# Patient Record
Sex: MALE | Race: WHITE | NOT HISPANIC OR LATINO | Employment: FULL TIME | URBAN - METROPOLITAN AREA
[De-identification: names, ages, dates, MRNs, and addresses within clinical notes are randomized per-mention and may not be internally consistent; named-entity substitution may affect disease eponyms.]

---

## 2018-11-23 ENCOUNTER — HOSPITAL ENCOUNTER (EMERGENCY)
Facility: HOSPITAL | Age: 43
Discharge: HOME | End: 2018-11-23
Attending: FAMILY MEDICINE
Payer: MEDICARE

## 2018-11-23 VITALS
OXYGEN SATURATION: 99 % | RESPIRATION RATE: 18 BRPM | SYSTOLIC BLOOD PRESSURE: 125 MMHG | HEART RATE: 121 BPM | TEMPERATURE: 98 F | DIASTOLIC BLOOD PRESSURE: 69 MMHG

## 2018-11-23 DIAGNOSIS — R41.82 ALTERED MENTAL STATE: ICD-10-CM

## 2018-11-23 DIAGNOSIS — F19.90 DRUG USE: Primary | ICD-10-CM

## 2018-11-23 LAB
ALBUMIN SERPL BCP-MCNC: 4.1 G/DL
ALLENS TEST: ABNORMAL
ALP SERPL-CCNC: 70 U/L
ALT SERPL W/O P-5'-P-CCNC: 140 U/L
AMORPH CRY URNS QL MICRO: NORMAL
AMPHET+METHAMPHET UR QL: NORMAL
ANION GAP SERPL CALC-SCNC: 17 MMOL/L
APAP SERPL-MCNC: <3 UG/ML
APTT BLDCRRT: 27.8 SEC
AST SERPL-CCNC: 103 U/L
BACTERIA #/AREA URNS HPF: NORMAL /HPF
BARBITURATES UR QL SCN>200 NG/ML: NEGATIVE
BASOPHILS # BLD AUTO: 0.02 K/UL
BASOPHILS NFR BLD: 0.1 %
BENZODIAZ UR QL SCN>200 NG/ML: NORMAL
BILIRUB SERPL-MCNC: 1.9 MG/DL
BILIRUB UR QL STRIP: NEGATIVE
BNP SERPL-MCNC: <10 PG/ML
BUN SERPL-MCNC: 17 MG/DL
BZE UR QL SCN: NEGATIVE
CALCIUM SERPL-MCNC: 9.7 MG/DL
CANNABINOIDS UR QL SCN: NORMAL
CHLORIDE SERPL-SCNC: 99 MMOL/L
CK SERPL-CCNC: 647 U/L
CLARITY UR: CLEAR
CO2 SERPL-SCNC: 16 MMOL/L
COLOR UR: YELLOW
CREAT SERPL-MCNC: 1.3 MG/DL
CREAT UR-MCNC: 90 MG/DL
DELSYS: ABNORMAL
DIFFERENTIAL METHOD: ABNORMAL
EOSINOPHIL # BLD AUTO: 0 K/UL
EOSINOPHIL NFR BLD: 0.1 %
ERYTHROCYTE [DISTWIDTH] IN BLOOD BY AUTOMATED COUNT: 14 %
EST. GFR  (AFRICAN AMERICAN): >60 ML/MIN/1.73 M^2
EST. GFR  (NON AFRICAN AMERICAN): >60 ML/MIN/1.73 M^2
ETHANOL SERPL-MCNC: 14 MG/DL
FIO2: 21
GLUCOSE SERPL-MCNC: 91 MG/DL
GLUCOSE UR QL STRIP: NEGATIVE
HCO3 UR-SCNC: 19.6 MMOL/L (ref 24–28)
HCT VFR BLD AUTO: 41.9 %
HGB BLD-MCNC: 14.8 G/DL
HGB UR QL STRIP: ABNORMAL
HYALINE CASTS #/AREA URNS LPF: 0 /LPF
INR PPP: 1
KETONES UR QL STRIP: ABNORMAL
LEUKOCYTE ESTERASE UR QL STRIP: NEGATIVE
LYMPHOCYTES # BLD AUTO: 1.3 K/UL
LYMPHOCYTES NFR BLD: 7.7 %
MAGNESIUM SERPL-MCNC: 3.1 MG/DL
MCH RBC QN AUTO: 32.2 PG
MCHC RBC AUTO-ENTMCNC: 35.3 G/DL
MCV RBC AUTO: 91 FL
METHADONE UR QL SCN>300 NG/ML: NEGATIVE
MICROSCOPIC COMMENT: NORMAL
MODE: ABNORMAL
MONOCYTES # BLD AUTO: 1.7 K/UL
MONOCYTES NFR BLD: 10.6 %
NEUTROPHILS # BLD AUTO: 13.4 K/UL
NEUTROPHILS NFR BLD: 81.5 %
NITRITE UR QL STRIP: NEGATIVE
OPIATES UR QL SCN: NEGATIVE
PCO2 BLDA: 35.5 MMHG (ref 35–45)
PCP UR QL SCN>25 NG/ML: NEGATIVE
PH SMN: 7.35 [PH] (ref 7.35–7.45)
PH UR STRIP: 6 [PH] (ref 5–8)
PLATELET # BLD AUTO: 233 K/UL
PMV BLD AUTO: 9.6 FL
PO2 BLDA: 28 MMHG (ref 40–60)
POC BE: -6 MMOL/L
POC SATURATED O2: 49 % (ref 95–100)
POTASSIUM SERPL-SCNC: 3.8 MMOL/L
PROT SERPL-MCNC: 7.7 G/DL
PROT UR QL STRIP: ABNORMAL
PROTHROMBIN TIME: 10.7 SEC
RBC # BLD AUTO: 4.59 M/UL
RBC #/AREA URNS HPF: 0 /HPF (ref 0–4)
SALICYLATES SERPL-MCNC: <5 MG/DL
SAMPLE: ABNORMAL
SITE: ABNORMAL
SODIUM SERPL-SCNC: 132 MMOL/L
SP GR UR STRIP: 1.02 (ref 1–1.03)
SQUAMOUS #/AREA URNS HPF: 0 /HPF
TOXICOLOGY INFORMATION: NORMAL
TROPONIN I SERPL DL<=0.01 NG/ML-MCNC: <0.006 NG/ML
TSH SERPL DL<=0.005 MIU/L-ACNC: 2.02 UIU/ML
URN SPEC COLLECT METH UR: ABNORMAL
UROBILINOGEN UR STRIP-ACNC: 1 EU/DL
WBC # BLD AUTO: 16.41 K/UL
WBC #/AREA URNS HPF: 3 /HPF (ref 0–5)

## 2018-11-23 PROCEDURE — 80320 DRUG SCREEN QUANTALCOHOLS: CPT

## 2018-11-23 PROCEDURE — 84484 ASSAY OF TROPONIN QUANT: CPT

## 2018-11-23 PROCEDURE — 85025 COMPLETE CBC W/AUTO DIFF WBC: CPT

## 2018-11-23 PROCEDURE — 36415 COLL VENOUS BLD VENIPUNCTURE: CPT

## 2018-11-23 PROCEDURE — 25000003 PHARM REV CODE 250: Performed by: FAMILY MEDICINE

## 2018-11-23 PROCEDURE — 82550 ASSAY OF CK (CPK): CPT

## 2018-11-23 PROCEDURE — 80307 DRUG TEST PRSMV CHEM ANLYZR: CPT

## 2018-11-23 PROCEDURE — 85610 PROTHROMBIN TIME: CPT

## 2018-11-23 PROCEDURE — 63600175 PHARM REV CODE 636 W HCPCS: Performed by: FAMILY MEDICINE

## 2018-11-23 PROCEDURE — 96372 THER/PROPH/DIAG INJ SC/IM: CPT | Mod: 59

## 2018-11-23 PROCEDURE — 81000 URINALYSIS NONAUTO W/SCOPE: CPT | Mod: 59

## 2018-11-23 PROCEDURE — 96374 THER/PROPH/DIAG INJ IV PUSH: CPT

## 2018-11-23 PROCEDURE — 99285 EMERGENCY DEPT VISIT HI MDM: CPT | Mod: 25

## 2018-11-23 PROCEDURE — 82803 BLOOD GASES ANY COMBINATION: CPT

## 2018-11-23 PROCEDURE — 83735 ASSAY OF MAGNESIUM: CPT

## 2018-11-23 PROCEDURE — 93005 ELECTROCARDIOGRAM TRACING: CPT

## 2018-11-23 PROCEDURE — 93010 ELECTROCARDIOGRAM REPORT: CPT | Mod: ,,, | Performed by: INTERNAL MEDICINE

## 2018-11-23 PROCEDURE — 80329 ANALGESICS NON-OPIOID 1 OR 2: CPT

## 2018-11-23 PROCEDURE — 83880 ASSAY OF NATRIURETIC PEPTIDE: CPT

## 2018-11-23 PROCEDURE — 85730 THROMBOPLASTIN TIME PARTIAL: CPT

## 2018-11-23 PROCEDURE — 96361 HYDRATE IV INFUSION ADD-ON: CPT

## 2018-11-23 PROCEDURE — 80053 COMPREHEN METABOLIC PANEL: CPT

## 2018-11-23 PROCEDURE — 84443 ASSAY THYROID STIM HORMONE: CPT

## 2018-11-23 PROCEDURE — 99900035 HC TECH TIME PER 15 MIN (STAT)

## 2018-11-23 RX ORDER — HALOPERIDOL 5 MG/ML
10 INJECTION INTRAMUSCULAR
Status: COMPLETED | OUTPATIENT
Start: 2018-11-23 | End: 2018-11-23

## 2018-11-23 RX ADMIN — HALOPERIDOL LACTATE 10 MG: 5 INJECTION, SOLUTION INTRAMUSCULAR at 01:11

## 2018-11-23 RX ADMIN — LORAZEPAM 2 MG: 2 INJECTION INTRAMUSCULAR; INTRAVENOUS at 12:11

## 2018-11-23 RX ADMIN — SODIUM CHLORIDE 1000 ML: 0.9 INJECTION, SOLUTION INTRAVENOUS at 02:11

## 2018-11-23 NOTE — ED PROVIDER NOTES
SCRIBE #1 NOTE: I, Regi Hicks, am scribing for, and in the presence of, Rabia Brock MD. I have scribed the HPI, ROS, and PEx.     SCRIBE #2 NOTE: I, Samara Chou, am scribing for, and in the presence of,  Bairon Bellamy MD. I have scribed the remaining portions of the note not scribed by Scribe #1.     History      Chief Complaint   Patient presents with    Hallucinations     person on scene reports possible seizure. Unknown if substance abuse, unknown if hit head. 5mg Versed given by EMS.        Review of patient's allergies indicates:  Not on File     HPI   HPI    11/23/2018, 12:31 AM   History obtained from EMS  HPI limited secondary to AMS      History of Present Illness: David Madrid is a 43 y.o. male patient who presents to the Emergency Department for AMS. Person on scene reports pt possibly had a seizure. Unknown substance abuse. Unknown head trauma. Unknown whether pt has a PMHx of seizures. Pt was given 5 of Versed en route.       Arrival mode: EMS    PCP: Primary Doctor No       Past Medical History:  Past medical history reviewed not relevant      Past Surgical History:  Past surgical history reviewed not relevant      Family History:  Family history reviewed not relevant      Social History:  Social History    Social History Main Topics    Social History Main Topics    Smoking status: Unknown if ever smoked    Smokeless tobacco: Unknown if ever used    Alcohol Use: Unknown drinking history    Drug Use: Unknown if ever used    Sexual Activity: Unknown       ROS   Review of Systems   Unable to perform ROS: Mental status change     Physical Exam      Initial Vitals   BP Pulse Resp Temp SpO2   11/23/18 0009 11/23/18 0009 11/23/18 0009 11/23/18 0014 11/23/18 0009   112/76 (!) 112 (!) 24 97 °F (36.1 °C) 95 %      MAP       --                 Physical Exam  Nursing Notes and Vital Signs Reviewed.  Constitutional: Patient is in mild distress. Well-developed and well-nourished.  Head: Atraumatic.  Normocephalic.  Eyes: PERRL but dilated, approximately 4-5mm. EOM intact. Conjunctivae are not pale. No scleral icterus.  ENT: Mucous membranes are moist.  Neck: Supple. Full ROM.   Cardiovascular: Regular rate. Regular rhythm. No murmurs, rubs, or gallops. Distal pulses are 2+ and symmetric.  Pulmonary/Chest: No respiratory distress. Clear to auscultation bilaterally. No wheezing or rales.  Abdominal: Soft and non-distended.   Musculoskeletal: Moves all extremities. No obvious deformities. No edema.  Skin: Warm and dry.  Neurological: Altered. Speech is incoherent. Neuro exam limited, pt is uncooperative.    ED Course    Procedures  ED Vital Signs:  Vitals:    11/23/18 0009 11/23/18 0014 11/23/18 0232 11/23/18 0252   BP: 112/76   122/72   Pulse: (!) 112  84 97   Resp: (!) 24  20    Temp:  97 °F (36.1 °C) 97.4 °F (36.3 °C)    TempSrc: Oral Axillary Oral    SpO2: 95%  96% 98%    11/23/18 0302 11/23/18 0315 11/23/18 0400 11/23/18 0545   BP: (!) 104/52 (!) 141/71 (!) 108/58 (!) 108/57   Pulse: 83 87 84 75   Resp:  18 20 18   Temp:       TempSrc:       SpO2: 95% 96% 97% 96%    11/23/18 0706 11/23/18 0710 11/23/18 0838 11/23/18 0902   BP: (!) 158/84  (!) 125/57    Pulse: 78 82 86    Resp:       Temp:    97.7 °F (36.5 °C)   TempSrc:       SpO2: 99%  99%     11/23/18 1008   BP: 125/69   Pulse: (!) 121   Resp:    Temp:    TempSrc:    SpO2: 99%       Abnormal Lab Results:  Labs Reviewed   ACETAMINOPHEN LEVEL - Abnormal; Notable for the following components:       Result Value    Acetaminophen (Tylenol), Serum <3.0 (*)     All other components within normal limits   CBC W/ AUTO DIFFERENTIAL - Abnormal; Notable for the following components:    WBC 16.41 (*)     RBC 4.59 (*)     MCH 32.2 (*)     Gran # (ANC) 13.4 (*)     Mono # 1.7 (*)     Gran% 81.5 (*)     Lymph% 7.7 (*)     All other components within normal limits   COMPREHENSIVE METABOLIC PANEL - Abnormal; Notable for the following components:    Sodium 132 (*)     CO2 16  (*)     Total Bilirubin 1.9 (*)      (*)      (*)     Anion Gap 17 (*)     All other components within normal limits   ALCOHOL,MEDICAL (ETHANOL) - Abnormal; Notable for the following components:    Alcohol, Medical, Serum 14 (*)     All other components within normal limits   MAGNESIUM - Abnormal; Notable for the following components:    Magnesium 3.1 (*)     All other components within normal limits   SALICYLATE LEVEL - Abnormal; Notable for the following components:    Salicylate Lvl <5.0 (*)     All other components within normal limits   URINALYSIS, REFLEX TO URINE CULTURE - Abnormal; Notable for the following components:    Protein, UA 1+ (*)     Ketones, UA 2+ (*)     Occult Blood UA Trace (*)     All other components within normal limits    Narrative:     Preferred Collection Type->Urine, Clean Catch   CK - Abnormal; Notable for the following components:     (*)     All other components within normal limits   ISTAT PROCEDURE - Abnormal; Notable for the following components:    POC PO2 28 (*)     POC HCO3 19.6 (*)     POC SATURATED O2 49 (*)     All other components within normal limits   APTT   B-TYPE NATRIURETIC PEPTIDE   DRUG SCREEN PANEL, URINE EMERGENCY    Narrative:     Preferred Collection Type->Urine, Clean Catch   PROTIME-INR   TROPONIN I   TSH   CK   URINALYSIS MICROSCOPIC    Narrative:     Preferred Collection Type->Urine, Clean Catch        All Lab Results:  Results for orders placed or performed during the hospital encounter of 11/23/18   Acetaminophen level   Result Value Ref Range    Acetaminophen (Tylenol), Serum <3.0 (L) 10.0 - 20.0 ug/mL   APTT   Result Value Ref Range    aPTT 27.8 21.0 - 32.0 sec   Brain natriuretic peptide   Result Value Ref Range    BNP <10 0 - 99 pg/mL   CBC auto differential   Result Value Ref Range    WBC 16.41 (H) 3.90 - 12.70 K/uL    RBC 4.59 (L) 4.60 - 6.20 M/uL    Hemoglobin 14.8 14.0 - 18.0 g/dL    Hematocrit 41.9 40.0 - 54.0 %    MCV 91 82 -  98 fL    MCH 32.2 (H) 27.0 - 31.0 pg    MCHC 35.3 32.0 - 36.0 g/dL    RDW 14.0 11.5 - 14.5 %    Platelets 233 150 - 350 K/uL    MPV 9.6 9.2 - 12.9 fL    Gran # (ANC) 13.4 (H) 1.8 - 7.7 K/uL    Lymph # 1.3 1.0 - 4.8 K/uL    Mono # 1.7 (H) 0.3 - 1.0 K/uL    Eos # 0.0 0.0 - 0.5 K/uL    Baso # 0.02 0.00 - 0.20 K/uL    Gran% 81.5 (H) 38.0 - 73.0 %    Lymph% 7.7 (L) 18.0 - 48.0 %    Mono% 10.6 4.0 - 15.0 %    Eosinophil% 0.1 0.0 - 8.0 %    Basophil% 0.1 0.0 - 1.9 %    Differential Method Automated    Comprehensive metabolic panel   Result Value Ref Range    Sodium 132 (L) 136 - 145 mmol/L    Potassium 3.8 3.5 - 5.1 mmol/L    Chloride 99 95 - 110 mmol/L    CO2 16 (L) 23 - 29 mmol/L    Glucose 91 70 - 110 mg/dL    BUN, Bld 17 6 - 20 mg/dL    Creatinine 1.3 0.5 - 1.4 mg/dL    Calcium 9.7 8.7 - 10.5 mg/dL    Total Protein 7.7 6.0 - 8.4 g/dL    Albumin 4.1 3.5 - 5.2 g/dL    Total Bilirubin 1.9 (H) 0.1 - 1.0 mg/dL    Alkaline Phosphatase 70 55 - 135 U/L     (H) 10 - 40 U/L     (H) 10 - 44 U/L    Anion Gap 17 (H) 8 - 16 mmol/L    eGFR if African American >60 >60 mL/min/1.73 m^2    eGFR if non African American >60 >60 mL/min/1.73 m^2   Drug screen panel, emergency   Result Value Ref Range    Benzodiazepines Presumptive Positive     Methadone metabolites Negative     Cocaine (Metab.) Negative     Opiate Scrn, Ur Negative     Barbiturate Screen, Ur Negative     Amphetamine Screen, Ur Presumptive Positive     THC Presumptive Positive     Phencyclidine Negative     Creatinine, Random Ur 90.0 23.0 - 375.0 mg/dL    Toxicology Information SEE COMMENT    Ethanol   Result Value Ref Range    Alcohol, Medical, Serum 14 (H) <10 mg/dL   Magnesium   Result Value Ref Range    Magnesium 3.1 (H) 1.6 - 2.6 mg/dL   Protime-INR   Result Value Ref Range    Prothrombin Time 10.7 9.0 - 12.5 sec    INR 1.0 0.8 - 1.2   Salicylate level   Result Value Ref Range    Salicylate Lvl <5.0 (L) 15.0 - 30.0 mg/dL   Troponin I   Result Value Ref  Range    Troponin I <0.006 0.000 - 0.026 ng/mL   TSH   Result Value Ref Range    TSH 2.017 0.400 - 4.000 uIU/mL   Urinalysis, Reflex to Urine Culture Urine, Clean Catch   Result Value Ref Range    Specimen UA Urine, Catheterized     Color, UA Yellow Yellow, Straw, Xuan    Appearance, UA Clear Clear    pH, UA 6.0 5.0 - 8.0    Specific Gravity, UA 1.025 1.005 - 1.030    Protein, UA 1+ (A) Negative    Glucose, UA Negative Negative    Ketones, UA 2+ (A) Negative    Bilirubin (UA) Negative Negative    Occult Blood UA Trace (A) Negative    Nitrite, UA Negative Negative    Urobilinogen, UA 1.0 <2.0 EU/dL    Leukocytes, UA Negative Negative   CK   Result Value Ref Range     (H) 20 - 200 U/L   Urinalysis Microscopic   Result Value Ref Range    RBC, UA 0 0 - 4 /hpf    WBC, UA 3 0 - 5 /hpf    Bacteria, UA Occasional None-Occ /hpf    Squam Epithel, UA 0 /hpf    Hyaline Casts, UA 0 0-1/lpf /lpf    Amorphous, UA Few None-Moderate    Microscopic Comment SEE COMMENT    ISTAT PROCEDURE   Result Value Ref Range    POC PH 7.351 7.35 - 7.45    POC PCO2 35.5 35 - 45 mmHg    POC PO2 28 (LL) 40 - 60 mmHg    POC HCO3 19.6 (L) 24 - 28 mmol/L    POC BE -6 -2 to 2 mmol/L    POC SATURATED O2 49 (L) 95 - 100 %    Sample VENOUS     Site Other     Allens Test N/A     DelSys Room Air     Mode SPONT     FiO2 21        Imaging Results:  Imaging Results          CT Head Without Contrast (Final result)  Result time 11/23/18 06:33:26    Final result by Pedrito Gallo III, MD (11/23/18 06:33:26)                 Impression:      Normal non contrast CT scan of the brain.    All CT scans at this facility are performed  using dose modulation techniques as appropriate to performed exam including the following:  automated exposure control; adjustment of mA and/or kV according to the patients size (this includes techniques or standardized protocols for targeted exams where dose is matched to indication/reason for exam: i.e. extremities or head);   iterative reconstruction technique.      Electronically signed by: Pedrito Gallo MD  Date:    11/23/2018  Time:    06:33             Narrative:    EXAMINATION:  CT HEAD WITHOUT CONTRAST    CLINICAL HISTORY:  Confusion/delirium, altered LOC, unexplained;    TECHNIQUE:  Standard non contrast CT scan of the brain.    COMPARISON:  None    FINDINGS:  The scan is slightly degraded by motion.  Otherwise there is no mass lesion, hemorrhage, hydrocephalus, or midline shift.  No acute/depressed skull fracture.                               X-Ray Chest AP Portable (Final result)  Result time 11/23/18 07:20:47    Final result by Pedrito Gallo III, MD (11/23/18 07:20:47)                 Impression:      Negative one view chest x-ray.      Electronically signed by: Pedrito Gallo MD  Date:    11/23/2018  Time:    07:20             Narrative:    EXAMINATION:  XR CHEST AP PORTABLE    CLINICAL HISTORY:  AMS;    COMPARISON:  None    FINDINGS:  Heart size is normal. The lung fields are clear. No acute pulmonary infiltrate.                               5:57 AM: Per STAT radiology, pt's CT Head results: no acute intracranial findings.    Ordered, reviewed, and independently interpreted by the ED provider.  Study: X-ray Chest  Findings: NAF    The EKG was ordered, reviewed, and independently interpreted by the ED provider.  Interpretation time: 0150  Rate: 82 BPM  Rhythm: normal sinus rhythm  Interpretation: Cannot r/o anterior infarct. No STEMI.           The Emergency Provider reviewed the vital signs and test results, which are outlined above.    ED Discussion     2:45 AM: Re-evaluated pt. Pt is resting comfortably in bed and VSS at this time.     2:57 AM: Dr. Brock transfers care of pt to Dr. Bellamy, pending lab/imaging results.    5:47 AM: Dr. Bellamy evaluated pt. Pt is resting comfortably and is in no acute distress. Patient is sleeping, but arouses to light tactile and verbal stimuli. Patient is unable to stay awake  to answer questions and follow commands. Patient in no respiratory distress. Pt is stable for discharge. Patient will be discharged upon becoming more alert.      ED Medication(s):  Medications   sodium chloride 0.9% bolus 1,000 mL (0 mLs Intravenous Stopped 11/23/18 0434)   lorazepam (ATIVAN) injection 2 mg (2 mg Intravenous Given 11/23/18 0050)   haloperidol lactate injection 10 mg (10 mg Intramuscular Given 11/23/18 0108)       Follow-up Information     [facility].    Contact information:  [address] [address] [phone]             [facility].    Specialty:  Emergency Medicine  Why:  As needed, If symptoms worsen  Contact information:  [address] [address] [phone]                   Medical Decision Making    Medical Decision Making:   Clinical Tests:   Lab Tests: Ordered and Reviewed  Radiological Study: Reviewed and Ordered  Medical Tests: Reviewed and Ordered           Scribe Attestation:   Scribe #1: I performed the above scribed service and the documentation accurately describes the services I performed. I attest to the accuracy of the note.    Attending:   Physician Attestation Statement for Scribe #1: I, Rabia Brock MD, personally performed the services described in this documentation, as scribed by Regi Hicks, in my presence, and it is both accurate and complete.       Scribe Attestation:   Scribe #2: I performed the above scribed service and the documentation accurately describes the services I performed. I attest to the accuracy of the note.    Attending Attestation:           Physician Attestation for Scribe:    Physician Attestation Statement for Scribe #2: I, Bairon Bellamy MD, reviewed documentation, as scribed by Samara Chuo in my presence, and it is both accurate and complete. I also acknowledge and confirm the content of the note done by Scribe #1.          Clinical Impression       ICD-10-CM  ICD-9-CM   1. Drug use F19.90 305.90   2. Altered mental state R41.82 780.97       Disposition:   Disposition: Discharged  Condition: Stable         Si NINA Bellamy MD  11/23/18 7493

## 2018-11-23 NOTE — ED NOTES
Pt arrived to the ED by [facility] with hallucinations pt was given 2mg ativan iv and 10 mg of haldol to help calm pt, after pt received haldol pt seems to be resting easily In bed.

## 2018-11-23 NOTE — ED NOTES
Patient still laying in bed. Arouses to vigorous stimulii. Dr. Bellamy verbalized patient can be discharged once awake and alert and able to speak and walk. Dr. Bellamy aware of patient low BP.

## 2018-11-23 NOTE — ED NOTES
Pt AAO, denies hallucinations at this time. Pt declined me calling someone for him, Pt got cell phone to call someone to get him

## 2018-11-23 NOTE — ED NOTES
Unable to perform ekg due to pt condition. MD and RN notified. Will continue attempt when Pt is capable.

## 2018-11-23 NOTE — ED NOTES
Patient laying in bed. Patient still not talking. Just moaning and moving in bed when aroused. Patient on monitor, will continue to monitor.

## 2019-02-27 DIAGNOSIS — M54.2 CERVICALGIA: Primary | ICD-10-CM

## 2019-03-30 ENCOUNTER — HOSPITAL ENCOUNTER (INPATIENT)
Facility: HOSPITAL | Age: 44
LOS: 6 days | Discharge: HOME | DRG: 897 | End: 2019-04-05
Attending: PSYCHIATRY & NEUROLOGY | Admitting: PSYCHIATRY & NEUROLOGY
Payer: MEDICARE

## 2019-03-30 DIAGNOSIS — B18.2 CHRONIC HEPATITIS C WITHOUT HEPATIC COMA: ICD-10-CM

## 2019-03-30 DIAGNOSIS — F19.94 SUBSTANCE INDUCED MOOD DISORDER: ICD-10-CM

## 2019-03-30 DIAGNOSIS — F10.939 ALCOHOL WITHDRAWAL SYNDROME WITH COMPLICATION: ICD-10-CM

## 2019-03-30 DIAGNOSIS — F43.10 PTSD (POST-TRAUMATIC STRESS DISORDER): ICD-10-CM

## 2019-03-30 DIAGNOSIS — F10.20 ALCOHOL USE DISORDER, SEVERE, DEPENDENCE: ICD-10-CM

## 2019-03-30 DIAGNOSIS — K21.9 GASTROESOPHAGEAL REFLUX DISEASE, ESOPHAGITIS PRESENCE NOT SPECIFIED: ICD-10-CM

## 2019-03-30 DIAGNOSIS — F10.94 ALCOHOL-INDUCED MOOD DISORDER: ICD-10-CM

## 2019-03-30 LAB
ESTIMATED AVG GLUCOSE: 103 MG/DL (ref 68–131)
FOLATE SERPL-MCNC: 19.3 NG/ML (ref 4–24)
HBA1C MFR BLD HPLC: 5.2 % (ref 4–5.6)
VIT B12 SERPL-MCNC: 429 PG/ML (ref 210–950)

## 2019-03-30 PROCEDURE — 84425 ASSAY OF VITAMIN B-1: CPT

## 2019-03-30 PROCEDURE — 36415 COLL VENOUS BLD VENIPUNCTURE: CPT

## 2019-03-30 PROCEDURE — 12400001 HC PSYCH SEMI-PRIVATE ROOM

## 2019-03-30 PROCEDURE — 82607 VITAMIN B-12: CPT

## 2019-03-30 PROCEDURE — 25000003 PHARM REV CODE 250: Performed by: PSYCHIATRY & NEUROLOGY

## 2019-03-30 PROCEDURE — 99223 1ST HOSP IP/OBS HIGH 75: CPT | Mod: AI,GC,, | Performed by: PSYCHIATRY & NEUROLOGY

## 2019-03-30 PROCEDURE — 83036 HEMOGLOBIN GLYCOSYLATED A1C: CPT

## 2019-03-30 PROCEDURE — 99223 PR INITIAL HOSPITAL CARE,LEVL III: ICD-10-PCS | Mod: AI,GC,, | Performed by: PSYCHIATRY & NEUROLOGY

## 2019-03-30 PROCEDURE — 82746 ASSAY OF FOLIC ACID SERUM: CPT

## 2019-03-30 RX ORDER — DIPHENHYDRAMINE HYDROCHLORIDE 50 MG/ML
50 INJECTION INTRAMUSCULAR; INTRAVENOUS EVERY 4 HOURS PRN
Status: DISCONTINUED | OUTPATIENT
Start: 2019-03-30 | End: 2019-04-05 | Stop reason: HOSPADM

## 2019-03-30 RX ORDER — PRAZOSIN HYDROCHLORIDE 1 MG/1
2 CAPSULE ORAL NIGHTLY
Status: DISCONTINUED | OUTPATIENT
Start: 2019-03-30 | End: 2019-04-01

## 2019-03-30 RX ORDER — THIAMINE HCL 100 MG
100 TABLET ORAL DAILY
Status: DISCONTINUED | OUTPATIENT
Start: 2019-03-30 | End: 2019-03-30

## 2019-03-30 RX ORDER — QUETIAPINE FUMARATE 300 MG/1
300 TABLET, FILM COATED ORAL NIGHTLY
Status: DISCONTINUED | OUTPATIENT
Start: 2019-03-30 | End: 2019-04-05 | Stop reason: HOSPADM

## 2019-03-30 RX ORDER — DOCUSATE SODIUM 100 MG/1
100 CAPSULE, LIQUID FILLED ORAL DAILY PRN
Status: DISCONTINUED | OUTPATIENT
Start: 2019-03-30 | End: 2019-04-05 | Stop reason: HOSPADM

## 2019-03-30 RX ORDER — DIPHENHYDRAMINE HCL 25 MG
25 CAPSULE ORAL NIGHTLY PRN
Status: DISCONTINUED | OUTPATIENT
Start: 2019-03-30 | End: 2019-04-05 | Stop reason: HOSPADM

## 2019-03-30 RX ORDER — FOLIC ACID 1 MG/1
1 TABLET ORAL DAILY
Status: DISCONTINUED | OUTPATIENT
Start: 2019-03-30 | End: 2019-04-05 | Stop reason: HOSPADM

## 2019-03-30 RX ORDER — IBUPROFEN 400 MG/1
400 TABLET ORAL EVERY 6 HOURS PRN
Status: DISCONTINUED | OUTPATIENT
Start: 2019-03-30 | End: 2019-04-05 | Stop reason: HOSPADM

## 2019-03-30 RX ORDER — DIAZEPAM 5 MG/1
10 TABLET ORAL 3 TIMES DAILY
Status: DISCONTINUED | OUTPATIENT
Start: 2019-03-30 | End: 2019-04-01

## 2019-03-30 RX ORDER — THIAMINE HCL 100 MG
100 TABLET ORAL DAILY
Status: DISCONTINUED | OUTPATIENT
Start: 2019-04-03 | End: 2019-03-31

## 2019-03-30 RX ORDER — MIRTAZAPINE 30 MG/1
30 TABLET, FILM COATED ORAL NIGHTLY
Status: DISCONTINUED | OUTPATIENT
Start: 2019-03-30 | End: 2019-04-05 | Stop reason: HOSPADM

## 2019-03-30 RX ORDER — HALOPERIDOL 5 MG/ML
5 INJECTION INTRAMUSCULAR EVERY 4 HOURS PRN
Status: DISCONTINUED | OUTPATIENT
Start: 2019-03-30 | End: 2019-04-05 | Stop reason: HOSPADM

## 2019-03-30 RX ORDER — LORAZEPAM 1 MG/1
2 TABLET ORAL EVERY 4 HOURS PRN
Status: DISCONTINUED | OUTPATIENT
Start: 2019-03-30 | End: 2019-04-05 | Stop reason: HOSPADM

## 2019-03-30 RX ORDER — THIAMINE HYDROCHLORIDE 100 MG/ML
100 INJECTION, SOLUTION INTRAMUSCULAR; INTRAVENOUS DAILY
Status: DISCONTINUED | OUTPATIENT
Start: 2019-03-30 | End: 2019-03-31

## 2019-03-30 RX ORDER — DIPHENHYDRAMINE HCL 50 MG
50 CAPSULE ORAL EVERY 4 HOURS PRN
Status: DISCONTINUED | OUTPATIENT
Start: 2019-03-30 | End: 2019-04-05 | Stop reason: HOSPADM

## 2019-03-30 RX ORDER — HALOPERIDOL 5 MG/1
5 TABLET ORAL EVERY 4 HOURS PRN
Status: DISCONTINUED | OUTPATIENT
Start: 2019-03-30 | End: 2019-04-05 | Stop reason: HOSPADM

## 2019-03-30 RX ADMIN — QUETIAPINE FUMARATE 300 MG: 300 TABLET ORAL at 08:03

## 2019-03-30 RX ADMIN — DIAZEPAM 10 MG: 5 TABLET ORAL at 08:03

## 2019-03-30 RX ADMIN — DIAZEPAM 10 MG: 5 TABLET ORAL at 11:03

## 2019-03-30 RX ADMIN — MIRTAZAPINE 30 MG: 30 TABLET, FILM COATED ORAL at 08:03

## 2019-03-30 RX ADMIN — FOLIC ACID 1 MG: 1 TABLET ORAL at 11:03

## 2019-03-30 RX ADMIN — PRAZOSIN HYDROCHLORIDE 2 MG: 1 CAPSULE ORAL at 08:03

## 2019-03-30 RX ADMIN — DIAZEPAM 10 MG: 5 TABLET ORAL at 04:03

## 2019-03-30 RX ADMIN — THERA TABS 1 TABLET: TAB at 11:03

## 2019-03-30 NOTE — PROGRESS NOTES
03/30/19 1300   [facility] Therapy   Group Name Therapeutic Recreation   Specific Interventions Skilled Activity Mild Exercises   Participation Level Appropriate;Attentive   Participation Quality Cooperative;Social   Insight/Motivation Good   Affect/Mood Display Appropriate   Cognition Alert

## 2019-03-30 NOTE — H&P
"[facility]  Psychiatry  History & Physical    Patient Name: David Madrid  MRN: [id number]   Code Status: No Order  Admission Date: (Not on file)  Attending Physician: Dr. Ellis  Primary Care Provider: Primary Doctor No    Current Legal Status: [facility]    Patient information was obtained from patient and ER records.     Subjective:     Principal Problem:<principal problem not specified>    Chief Complaint:  SI     HPI: Per ER RN:  "Pt presents to ED with suicidal ideation since returning from [place] in 2001. Pt states that he thinks about committing suicide daily and even dreams about killing himself. Pt states that he has severe PTSD for time in the  and that he wakes up several times a night due to nightmares. Pt states that he has a plan to kill himself, but he has never acted upon thoughts because he has a son. Pt states that he has been abusing ETOH for the past few yrs and drinks at least 1 pint of liquor daily and smokes about 1 pck of cigarettes daily as well. Pt states that today he woke up on the floor and was tremoring due to withdrawal from alcohol which led him to drink 1 pint of whiskey and a few beers. Pt states that today he tried to get into a facility to complete a 28 day detox, but was denied. Pt reports that he has gone through two detox programs in the past few yrs. Pt is prescribed Seroquel 300 mg for depression, but reports that he doesn't take it as prescribed because he doesn't like the way it makes him feel. Pt denies illicit drug use. Pt states that he does have family support system and that he no longer works due to being a disabled . Pt AAOx4 and ambulates independently with steady gait.  "     Per ER MD:  "48 y/o M with a medical history of depression, alcohol abuse and Hepatitis C presents with suicidal intention. 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 Patient states that he " "normally drinks 12 beers a day but today he wanted to kill himself by "drinking himself to death". He drank 1 pint of whiskey and 6 beers. States he hasn't eaten in the past 3 days. Patient is originally from [place] and moved down here after he met a woman online. He had a fight with his girlfriend last week, which caused him to spiral. Patient is on seroquel 300 mg, rameron 30 mg and zantec.  "     ========     Patient alert, sitting in chair on entry to room with sitter at bedside. Calm, appropriate, and linear throughout with constricted affect. No objective signs psychosis or mansi during interview. Mansi screen negative re past hx. No history AVH, no current. Patient reports he arrived to the ER for "suicidal thoughts and alcohol detox". Patient reports SI over the past two years with progressive increase in frequency of SI, daily for the past two weeks. Says SI worsened after recent breakup with girlfriend 2 weeks ago. Describes intermittent SI throughout his life, but notes significant worsening after returning from  duty in [place] in 2001. Says since then SI has been off and on, and reports PTSD symptoms since that time of nightmares, night terrors, flashbacks, and exaggerated startle response. Says experiences nightmares nightly, though later in interview when discussing prazosin reports relief of these nightmares when taken. Denies any previous suicide attempts but does report instances of trying to "drink [himself] to death". Denies current plan, reports he did have a plan formulated last year for [id number]. Patient reports depressed mood x2 weeks after break up with girlfriend. Endorses neurovegetative symptoms of poor sleep (trouble falling, staying, + PTSD symptoms), anhedonia (previously enjoyed fishing), guilt and hopelessness ("shame" over current situation, hopelessness to future with notable lack of future-orientation when asked further), poor appetite with 10 lb weight loss over two " "weeks, and recent SI - 5/9 SIGECAPSD x2 weeks.      Patient reports he drinks alcohol daily, endorses rare marijuana use and denies other illicits. Patient says he usually drinks 2-3 beers daily, but describes binge-pattern behavior. Says over the past two days he increased his daily drinking to "at least a 12 pack and a pint of Kalyan Fonseca". Last drink was yesterday ~5 PM tail-end of a binge similar to level just described. Says this is typical for when he starts to binge. Patient was in [facility] last week for domestic battery charges after him and girlfriend got into an argument, reports this was his last detox - was without medication, patient says it took 4 days for symptoms to subside and cites tremors, night sweats, insomnia, and jitters. Patient last went to inpatient rehab last year for alcohol, says "I've been trying to kick this habit a long time". Describes his alcohol use as "waxing and waning" consistent with binge drinking. Patient denies hx of known seizures but alarmingly reports that yesterday he had an episode where he fell, was shaking and bit his tongue. Denies incontinence during the episode.     Patient reports he has been on seroquel 300 mg PO qhs, remeron 30 mg PO qhs, and prazosin 2 mg PO qhs for several years. First filled in [place] but patient now gets them filled by a Dr. Bee on the [facility].     Past Psychiatric History:  Previous Medication Trials: seroquel , remeron, prazosin  Previous Psychiatric Hospitalizations: once, about a year ago in [place] for SI w/ no attempt  Previous Suicide Attempts: denied, though reports attempts to drink himself to death  History of Violence: denied  Outpatient psychiatrist: Dr. Bee on the [facility]      Social History:  Marital Status: single (recent breakup)  Children: 4 adult children  Source of Income: VA benefits and [employer] work ()  Education: HS grad, associates degree [employer]  Special Ed: denied  Housing Status: living with a " friend   History of phys/sexual abuse: denied  Access to gun: denied     Substance Use  Recreational Drugs: rare marijuana, denied others  Use of Alcohol: yes, heavy, see HPI  Tobacco Use: 1 ppd  Rehab History: 2x for alcohol, most recent last year inpatient  H/O Complicated Withdrawal: likely - denies known hx seizures but reports seizure like activity yesterday as noted in HPI      Legal History:  Past Charges/Incarcerations: yes , 6 1/2 years for drug charges  Pending charges: yes, domestic battery charges pending     Family Psychiatric History:  denied             Patient History           Medical as of 3/30/2019     Past Medical History     Diagnosis Date Comments Source    Alcohol abuse -- -- Patient    Depression -- -- Patient    Hepatitis C -- -- Patient                  Surgical as of 3/30/2019    Past Surgical History: Patient provided no pertinent surgical history.           Family as of 3/30/2019    None           Tobacco Use as of 3/30/2019     Smoking Status Smoking Start Date Smoking Quit Date Packs/Day Years Used    Never Assessed -- -- -- --    Types Comments Smokeless Tobacco Status Smokeless Tobacco Quit Date Source    -- -- Unknown -- --            Alcohol Use as of 3/30/2019    None           Drug Use as of 3/30/2019    None           Sexual Activity as of 3/30/2019    None           Activities of Daily Living as of 3/30/2019    None           Social Documentation as of 3/30/2019    None           Occupational as of 3/30/2019    None           Socioeconomic as of 3/30/2019     Marital Status Spouse Name Number of Children Years Education Education Level Preferred Language Ethnicity Race Source    Single -- -- -- -- English /White White --    Financial Resource Strain Food Insecurity: Worry Food Insecurity: Inability Transportation Needs: Medical Transportation Needs: Non-medical    -- -- -- -- --            Pertinent History     Question Response Comments    Lives with -- --    Place in  Birth Order -- --    Lives in -- --    Number of Siblings -- --    Raised by -- --    Legal Involvement -- --    Childhood Trauma -- --    Criminal History of -- --    Financial Status -- --    Highest Level of Education -- --    Does patient have access to a firearm? -- --     Service -- --    Primary Leisure Activity -- --    Spirituality -- --        Past Medical History:   Diagnosis Date    Alcohol abuse     Depression     Hepatitis C      No past surgical history on file.  Family History     None        Tobacco Use    Smoking status: Not on file   Substance and Sexual Activity    Alcohol use: Not on file    Drug use: Not on file    Sexual activity: Not on file     Review of patient's allergies indicates:   Allergen Reactions    Penicillins        Current Facility-Administered Medications on File Prior to Encounter   Medication    [COMPLETED] diazePAM tablet 10 mg    folic acid tablet 1 mg    [COMPLETED] LORazepam tablet 2 mg    multivitamin tablet 1 tablet    [DISCONTINUED] diazePAM injection 10 mg    [DISCONTINUED] folic acid tablet 1 mg    [DISCONTINUED] folic acid tablet 1 mg    [DISCONTINUED] LORazepam tablet 2 mg    [DISCONTINUED] multivitamin tablet 1 tablet     Current Outpatient Medications on File Prior to Encounter   Medication Sig    QUEtiapine (SEROQUEL) 300 MG Tab Take by mouth.     Psychotherapeutics (From admission, onward)    None        Review of Systems   Constitutional: Positive for appetite change and unexpected weight change. Negative for diaphoresis and fever.   HENT: Negative for congestion, ear pain and sore throat.    Eyes: Negative for pain and visual disturbance.   Respiratory: Negative for shortness of breath.    Cardiovascular: Negative for chest pain and palpitations.   Gastrointestinal: Negative for abdominal distention and abdominal pain.   Genitourinary: Positive for difficulty urinating. Negative for dysuria, enuresis, flank pain, frequency and  urgency.        Some trouble initiating stream - likely early BPH   Musculoskeletal: Positive for back pain and neck pain.        Mild, stable, chronic lower back and neck pain     Neurological: Negative for dizziness, light-headedness, numbness and headaches.        Possible seizure acitivity yesterday, see HPI   Psychiatric/Behavioral:        See HPI, MSE     Strengths and Liabilities: Strength: Patient accepts guidance/feedback, Strength: Patient is expressive/articulate., Strength: Patient has reasonable judgment., Liability: Patient lacks coping skills.    Objective:     Vital Signs (Most Recent):    Vital Signs (24h Range):  Temp:  [97.9 °F (36.6 °C)-99.1 °F (37.3 °C)] 97.9 °F (36.6 °C)  Pulse:  [] 85  Resp:  [16-18] 18  SpO2:  [96 %-97 %] 96 %  BP: (120)/(80-84) 120/80           There is no height or weight on file to calculate BMI.    No intake or output data in the 24 hours ending 03/30/19 0855    Physical Exam   Constitutional: He is oriented to person, place, and time. He appears well-developed and well-nourished. No distress.   HENT:   Head: Normocephalic and atraumatic.   Eyes: Pupils are equal, round, and reactive to light. Conjunctivae and EOM are normal.   Neck: Normal range of motion.   Cardiovascular: Normal rate, regular rhythm and normal heart sounds. Exam reveals no gallop and no friction rub.   No murmur heard.  Pulmonary/Chest: Effort normal and breath sounds normal. No respiratory distress. He has no wheezes.   Abdominal: Soft. Bowel sounds are normal. He exhibits no distension. There is no tenderness. There is no guarding.   Neurological: He is alert and oriented to person, place, and time. No cranial nerve deficit or sensory deficit.   Skin: Skin is warm and dry. No rash noted. He is not diaphoretic. No erythema.   Psychiatric:   PSYCHIATRIC   Appearance: unremarkable, age appropriate, laying in chair/bed, NAD noted  Grooming: fair  Arousal: alert  Behavior/Cooperation: normal,  "cooperative  Speech: monotonous, normal rate, normal volume, nonspontaneous  Language: appropriate   Mood: "somber"  Affect: constricted  Thought Process: normal and logical  Thought Content: denies current SI/HI/AVH but recent SI noted (Denies recent HI/AVH), no paranoid or delusional TC volunteered  Associations: no loose associations noted  Orientation: AOx4   Memory: immediate recall 3/3, short term recall 3/3  Fund of Knowledge: appropriate for education level  Attention Span/Concentration: WORLD forwards and backwards  Cognition: grossly intact  Insight: fair  Judgment: fair         NEUROLOGICAL EXAMINATION:     MENTAL STATUS   Oriented to person, place, and time.     CRANIAL NERVES     CN III, IV, VI   Pupils are equal, round, and reactive to light.  Extraocular motions are normal.     Significant Labs: All pertinent labs within the past 24 hours have been reviewed.    Significant Imaging: I have reviewed all pertinent imaging results/findings within the past 24 hours.    Assessment/Plan:     PTSD (post-traumatic stress disorder)  A:  - symptoms after returning from  duty in [place] in 2001  - endorses symptoms of nightmares, night terrors, exaggerated startle  - reports prazosin 2 mg PO nightly has helped alleviate night symptoms     P:  - continue prazosin 2 mg PO nightly  - monitor BP (stable)        Alcohol withdrawal  A:  - Patient with current symptoms anxiety, tremors, insomnia  - in ER received ativan 2 mg PO @ 0032 and valium 10 mg IM @ 0832  - mild tachy overnight to 104 but most recent vitals at 730 AM day of admit WNL (HR 80, /80)  - patient with concerning seizure-like activity reported yesterday, but denies hx known seizures otherwise  - EtOH 274 on arrival to ER (3/29/19 evening)    P:  - received benzos as above in ER  - will start valium taper        Day 1 : valium 10 mg PO QID (will get TID today as he received 10 mg IM in ER which will sub for first dose)        Day 2 : valium 10 " mg PO TID        Day 3 : valium 10 mg PO qAM, 5 at lunch, 10 qhs        Day 4 : valium 10 mg PO BID        Day 4 : valium 5 mg PO BID        Day 5 : valium 5 mg PO qam    - ativan 2 mg PO q4h PRN for HR or DBP > 100  - ativan 2 mg IM PRN for seizures        Substance induced mood disorder  A:  - Vs MDD  - Patient with long history of binge drinking behavior  - recent binge drinking, appears triggered by recent stressors including breakup with girlfriend  - Patient with 5/9 SIGECAPSD x2 weeks. Recent, persistent, and frequent SI reported.  - Patient amenable to inpatient psychiatric placement, consider [facility] after admit     P:  - continue home meds (seroquel 300 mg PO nightly, remeron 30 mg PO nightly, prazosin 2 mg PO nightly), patient reports good efficacy  - Consider discontinuing remeron in favor of SSRI, but at this time will keep remeron on board given patient's recent weight loss w/ poor appetite and insomnia.   - Defer above decision to primary team after patient begins detox, hopefully regains sleep and appetite over the weekend.    Alcohol use disorder, severe  A: see HPI  P: - withdrawal plan as aboove      - B12, thiamine, folate levels      - supplement above           Estimated Discharge Date:   Initial Discharge Plan: Home vs rehab    Prognosis: Fair    Need for Continued Hospitalization:   Psychiatric illness continues to pose a potential threat to life or bodily function, of self or others, thereby requiring the need for continued inpatient psychiatric hospitalization.    Total Time: 70 with greater than 50% of time spent in counseling and/or coordination of care.     Fran Parish MD   Psychiatry PGY2  [facility]

## 2019-03-30 NOTE — PLAN OF CARE
"Problem: Adult Behavioral Health Plan of Care  Goal: Plan of Care Review  Outcome: Ongoing (interventions implemented as appropriate)  POC discussed with pt, he was admitted to the [facility] from the ED. Pt is calm and cooperative. ID checked using two person identifier. Pt checked for contraband, and wearing blue paper scrubs. Pt's belongings checked in and secured. Pt has a flat affect states he is depressed but not currently suicidal, Pt denies HI/AVH's. He signed legal documents and is following directions. Pt does contract for safety on the unit. Pt oriented to the unit and introduced to staff. Safety and environmental screen done safety plan discussed, Pt offered a shower he declined. Pt states "I am tired" no other complaints at this time. Pt escorted to his room and is lying in bed resting NAD noted.        "

## 2019-03-30 NOTE — HPI
"Per ER RN:  "Pt presents to ED with suicidal ideation since returning from [place] in 2001. Pt states that he thinks about committing suicide daily and even dreams about killing himself. Pt states that he has severe PTSD for time in the  and that he wakes up several times a night due to nightmares. Pt states that he has a plan to kill himself, but he has never acted upon thoughts because he has a son. Pt states that he has been abusing ETOH for the past few yrs and drinks at least 1 pint of liquor daily and smokes about 1 pck of cigarettes daily as well. Pt states that today he woke up on the floor and was tremoring due to withdrawal from alcohol which led him to drink 1 pint of whiskey and a few beers. Pt states that today he tried to get into a facility to complete a 28 day detox, but was denied. Pt reports that he has gone through two detox programs in the past few yrs. Pt is prescribed Seroquel 300 mg for depression, but reports that he doesn't take it as prescribed because he doesn't like the way it makes him feel. Pt denies illicit drug use. Pt states that he does have family support system and that he no longer works due to being a disabled . Pt AAOx4 and ambulates independently with steady gait.  "     Per ER MD:  "48 y/o M with a medical history of depression, alcohol abuse and Hepatitis C presents with suicidal intention. 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 Patient states that he normally drinks 12 beers a day but today he wanted to kill himself by "drinking himself to death". He drank 1 pint of whiskey and 6 beers. States he hasn't eaten in the past 3 days. Patient is originally from [place] and moved down here after he met a woman online. He had a fight with his girlfriend last week, which caused him to spiral. Patient is on seroquel 300 mg, rameron 30 mg and zantec.  "     ========     Patient alert, sitting in " "chair on entry to room with sitter at bedside. Calm, appropriate, and linear throughout with constricted affect. No objective signs psychosis or mansi during interview. Mansi screen negative re past hx. No history AVH, no current. Patient reports he arrived to the ER for "suicidal thoughts and alcohol detox". Patient reports SI over the past two years with progressive increase in frequency of SI, daily for the past two weeks. Says SI worsened after recent breakup with girlfriend 2 weeks ago. Describes intermittent SI throughout his life, but notes significant worsening after returning from  duty in [place] in 2001. Says since then SI has been off and on, and reports PTSD symptoms since that time of nightmares, night terrors, flashbacks, and exaggerated startle response. Says experiences nightmares nightly, though later in interview when discussing prazosin reports relief of these nightmares when taken. Denies any previous suicide attempts but does report instances of trying to "drink [himself] to death". Denies current plan, reports he did have a plan formulated last year for [id number]. Patient reports depressed mood x2 weeks after break up with girlfriend. Endorses neurovegetative symptoms of poor sleep (trouble falling, staying, + PTSD symptoms), anhedonia (previously enjoyed fishing), guilt and hopelessness ("shame" over current situation, hopelessness to future with notable lack of future-orientation when asked further), poor appetite with 10 lb weight loss over two weeks, and recent SI - 5/9 SIGECAPSD x2 weeks.      Patient reports he drinks alcohol daily, endorses rare marijuana use and denies other illicits. Patient says he usually drinks 2-3 beers daily, but describes binge-pattern behavior. Says over the past two days he increased his daily drinking to "at least a 12 pack and a pint of Kalyan Fonseca". Last drink was yesterday ~5 PM tail-end of a binge similar to level just described. Says this is " "typical for when he starts to binge. Patient was in [facility] week for domestic battery charges after him and girlfriend got into an argument, reports this was his last detox - was without medication, patient says it took 4 days for symptoms to subside and cites tremors, night sweats, insomnia, and jitters. Patient last went to inpatient rehab last year for alcohol, says "I've been trying to kick this habit a long time". Describes his alcohol use as "waxing and waning" consistent with binge drinking. Patient denies hx of known seizures but alarmingly reports that yesterday he had an episode where he fell, was shaking and bit his tongue. Denies incontinence during the episode.     Patient reports he has been on seroquel 300 mg PO qhs, remeron 30 mg PO qhs, and prazosin 2 mg PO qhs for several years. First filled in [place] but patient now gets them filled by a Dr. Bee on the [facility].     Past Psychiatric History:  Previous Medication Trials: seroquel , remeron, prazosin  Previous Psychiatric Hospitalizations: once, about a year ago in [place] for SI w/ no attempt  Previous Suicide Attempts: denied, though reports attempts to drink himself to death  History of Violence: denied  Outpatient psychiatrist: Dr. Bee on the [facility]      Social History:  Marital Status: single (recent breakup)  Children: 4 adult children  Source of Income: VA benefits and [employer] work ([employer])  Education: HS grad, associates degree [employer]  Special Ed: denied  Housing Status: living with a friend   History of phys/sexual abuse: denied  Access to gun: denied     Substance Use  Recreational Drugs: rare marijuana, denied others  Use of Alcohol: yes, heavy, see HPI  Tobacco Use: 1 ppd  Rehab History: 2x for alcohol, most recent last year inpatient  H/O Complicated Withdrawal: likely - denies known hx seizures but reports seizure like activity yesterday as noted in HPI      Legal History:  Past Charges/Incarcerations: yes , 6 1/2 " years for drug charges  Pending charges: yes, domestic battery charges pending     Family Psychiatric History:  denied

## 2019-03-30 NOTE — SUBJECTIVE & OBJECTIVE
Patient History           Medical as of 3/30/2019     Past Medical History     Diagnosis Date Comments Source    Alcohol abuse -- -- Patient    Depression -- -- Patient    Hepatitis C -- -- Patient                  Surgical as of 3/30/2019    Past Surgical History: Patient provided no pertinent surgical history.           Family as of 3/30/2019    None           Tobacco Use as of 3/30/2019     Smoking Status Smoking Start Date Smoking Quit Date Packs/Day Years Used    Never Assessed -- -- -- --    Types Comments Smokeless Tobacco Status Smokeless Tobacco Quit Date Source    -- -- Unknown -- --            Alcohol Use as of 3/30/2019    None           Drug Use as of 3/30/2019    None           Sexual Activity as of 3/30/2019    None           Activities of Daily Living as of 3/30/2019    None           Social Documentation as of 3/30/2019    None           Occupational as of 3/30/2019    None           Socioeconomic as of 3/30/2019     Marital Status Spouse Name Number of Children Years Education Education Level Preferred Language Ethnicity Race Source    Single -- -- -- -- English /White White --    Financial Resource Strain Food Insecurity: Worry Food Insecurity: Inability Transportation Needs: Medical Transportation Needs: Non-medical    -- -- -- -- --            Pertinent History     Question Response Comments    Lives with -- --    Place in Birth Order -- --    Lives in -- --    Number of Siblings -- --    Raised by -- --    Legal Involvement -- --    Childhood Trauma -- --    Criminal History of -- --    Financial Status -- --    Highest Level of Education -- --    Does patient have access to a firearm? -- --     Service -- --    Primary Leisure Activity -- --    Spirituality -- --        Past Medical History:   Diagnosis Date    Alcohol abuse     Depression     Hepatitis C      No past surgical history on file.  Family History     None        Tobacco Use    Smoking status: Not on file    Substance and Sexual Activity    Alcohol use: Not on file    Drug use: Not on file    Sexual activity: Not on file     Review of patient's allergies indicates:   Allergen Reactions    Penicillins        Current Facility-Administered Medications on File Prior to Encounter   Medication    [COMPLETED] diazePAM tablet 10 mg    folic acid tablet 1 mg    [COMPLETED] LORazepam tablet 2 mg    multivitamin tablet 1 tablet    [DISCONTINUED] diazePAM injection 10 mg    [DISCONTINUED] folic acid tablet 1 mg    [DISCONTINUED] folic acid tablet 1 mg    [DISCONTINUED] LORazepam tablet 2 mg    [DISCONTINUED] multivitamin tablet 1 tablet     Current Outpatient Medications on File Prior to Encounter   Medication Sig    QUEtiapine (SEROQUEL) 300 MG Tab Take by mouth.     Psychotherapeutics (From admission, onward)    None        Review of Systems   Constitutional: Positive for appetite change and unexpected weight change. Negative for diaphoresis and fever.   HENT: Negative for congestion, ear pain and sore throat.    Eyes: Negative for pain and visual disturbance.   Respiratory: Negative for shortness of breath.    Cardiovascular: Negative for chest pain and palpitations.   Gastrointestinal: Negative for abdominal distention and abdominal pain.   Genitourinary: Positive for difficulty urinating. Negative for dysuria, enuresis, flank pain, frequency and urgency.        Some trouble initiating stream - likely early BPH   Musculoskeletal: Positive for back pain and neck pain.        Mild, stable, chronic lower back and neck pain     Neurological: Negative for dizziness, light-headedness, numbness and headaches.        Possible seizure acitivity yesterday, see HPI   Psychiatric/Behavioral:        See HPI, MSE     Strengths and Liabilities: Strength: Patient accepts guidance/feedback, Strength: Patient is expressive/articulate., Strength: Patient has reasonable judgment., Liability: Patient lacks coping  "skills.    Objective:     Vital Signs (Most Recent):    Vital Signs (24h Range):  Temp:  [97.9 °F (36.6 °C)-99.1 °F (37.3 °C)] 97.9 °F (36.6 °C)  Pulse:  [] 85  Resp:  [16-18] 18  SpO2:  [96 %-97 %] 96 %  BP: (120)/(80-84) 120/80           There is no height or weight on file to calculate BMI.    No intake or output data in the 24 hours ending 03/30/19 0855    Physical Exam   Constitutional: He is oriented to person, place, and time. He appears well-developed and well-nourished. No distress.   HENT:   Head: Normocephalic and atraumatic.   Eyes: Pupils are equal, round, and reactive to light. Conjunctivae and EOM are normal.   Neck: Normal range of motion.   Cardiovascular: Normal rate, regular rhythm and normal heart sounds. Exam reveals no gallop and no friction rub.   No murmur heard.  Pulmonary/Chest: Effort normal and breath sounds normal. No respiratory distress. He has no wheezes.   Abdominal: Soft. Bowel sounds are normal. He exhibits no distension. There is no tenderness. There is no guarding.   Neurological: He is alert and oriented to person, place, and time. No cranial nerve deficit or sensory deficit.   Skin: Skin is warm and dry. No rash noted. He is not diaphoretic. No erythema.   Psychiatric:   PSYCHIATRIC   Appearance: unremarkable, age appropriate, laying in chair/bed, NAD noted  Grooming: fair  Arousal: alert  Behavior/Cooperation: normal, cooperative  Speech: monotonous, normal rate, normal volume, nonspontaneous  Language: appropriate   Mood: "somber"  Affect: constricted  Thought Process: normal and logical  Thought Content: denies current SI/HI/AVH but recent SI noted (Denies recent HI/AVH), no paranoid or delusional TC volunteered  Associations: no loose associations noted  Orientation: AOx4   Memory: immediate recall 3/3, short term recall 3/3  Fund of Knowledge: appropriate for education level  Attention Span/Concentration: WORLD forwards and backwards  Cognition: grossly " intact  Insight: fair  Judgment: fair         NEUROLOGICAL EXAMINATION:     MENTAL STATUS   Oriented to person, place, and time.     CRANIAL NERVES     CN III, IV, VI   Pupils are equal, round, and reactive to light.  Extraocular motions are normal.     Significant Labs: All pertinent labs within the past 24 hours have been reviewed.    Significant Imaging: I have reviewed all pertinent imaging results/findings within the past 24 hours.

## 2019-03-30 NOTE — ASSESSMENT & PLAN NOTE
A:  - symptoms after returning from  duty in [place] in 2001  - endorses symptoms of nightmares, night terrors, exaggerated startle  - reports prazosin 2 mg PO nightly has helped alleviate night symptoms     P:  - continue prazosin 2 mg PO nightly  - monitor BP (stable)

## 2019-03-30 NOTE — ASSESSMENT & PLAN NOTE
A:  - Vs MDD  - Patient with long history of binge drinking behavior  - recent binge drinking, appears triggered by recent stressors including breakup with girlfriend  - Patient with 5/9 SIGECAPSD x2 weeks. Recent, persistent, and frequent SI reported.  - Patient amenable to inpatient psychiatric placement, consider [facility] after admit     P:  - continue home meds (seroquel 300 mg PO nightly, remeron 30 mg PO nightly, prazosin 2 mg PO nightly), patient reports good efficacy  - Consider discontinuing remeron in favor of SSRI, but at this time will keep remeron on board given patient's recent weight loss w/ poor appetite and insomnia.   - Defer above decision to primary team after patient begins detox, hopefully regains sleep and appetite over the weekend.

## 2019-03-30 NOTE — ASSESSMENT & PLAN NOTE
A:  - Patient with current symptoms anxiety, tremors, insomnia  - in ER received ativan 2 mg PO @ 0032 and valium 10 mg IM @ 0832  - mild tachy overnight to 104 but most recent vitals at 730 AM day of admit WNL (HR 80, /80)  - patient with concerning seizure-like activity reported yesterday, but denies hx known seizures otherwise  - EtOH 274 on arrival to ER (3/29/19 evening)    P:  - received benzos as above in ER  - will start valium taper        Day 1 : valium 10 mg PO QID (will get TID today as he received 10 mg IM in ER which will sub for first dose)        Day 2 : valium 10 mg PO TID        Day 3 : valium 10 mg PO qAM, 5 at lunch, 10 qhs        Day 4 : valium 10 mg PO BID        Day 4 : valium 5 mg PO BID        Day 5 : valium 5 mg PO qam    - ativan 2 mg PO q4h PRN for HR or DBP > 100  - ativan 2 mg IM PRN for seizures

## 2019-03-31 PROBLEM — K21.9 GERD (GASTROESOPHAGEAL REFLUX DISEASE): Status: ACTIVE | Noted: 2019-03-31

## 2019-03-31 LAB
CHOLEST SERPL-MCNC: 200 MG/DL (ref 120–199)
CHOLEST/HDLC SERPL: 4.2 {RATIO} (ref 2–5)
HDLC SERPL-MCNC: 48 MG/DL (ref 40–75)
HDLC SERPL: 24 % (ref 20–50)
LDLC SERPL CALC-MCNC: 123.6 MG/DL (ref 63–159)
NONHDLC SERPL-MCNC: 152 MG/DL
TRIGL SERPL-MCNC: 142 MG/DL (ref 30–150)

## 2019-03-31 PROCEDURE — 99232 SBSQ HOSP IP/OBS MODERATE 35: CPT | Mod: ,,, | Performed by: PSYCHIATRY & NEUROLOGY

## 2019-03-31 PROCEDURE — 99232 PR SUBSEQUENT HOSPITAL CARE,LEVL II: ICD-10-PCS | Mod: ,,, | Performed by: PSYCHIATRY & NEUROLOGY

## 2019-03-31 PROCEDURE — 25000003 PHARM REV CODE 250: Performed by: PSYCHIATRY & NEUROLOGY

## 2019-03-31 PROCEDURE — S4991 NICOTINE PATCH NONLEGEND: HCPCS | Performed by: PSYCHIATRY & NEUROLOGY

## 2019-03-31 PROCEDURE — 80061 LIPID PANEL: CPT

## 2019-03-31 PROCEDURE — 12400001 HC PSYCH SEMI-PRIVATE ROOM

## 2019-03-31 PROCEDURE — 36415 COLL VENOUS BLD VENIPUNCTURE: CPT

## 2019-03-31 RX ORDER — FAMOTIDINE 20 MG/1
20 TABLET, FILM COATED ORAL 2 TIMES DAILY
Status: DISCONTINUED | OUTPATIENT
Start: 2019-03-31 | End: 2019-04-03

## 2019-03-31 RX ORDER — THIAMINE HCL 100 MG
100 TABLET ORAL 2 TIMES DAILY
Status: DISCONTINUED | OUTPATIENT
Start: 2019-03-31 | End: 2019-04-05 | Stop reason: HOSPADM

## 2019-03-31 RX ORDER — IBUPROFEN 200 MG
1 TABLET ORAL DAILY
Status: DISCONTINUED | OUTPATIENT
Start: 2019-03-31 | End: 2019-04-05 | Stop reason: HOSPADM

## 2019-03-31 RX ADMIN — DIAZEPAM 10 MG: 5 TABLET ORAL at 08:03

## 2019-03-31 RX ADMIN — Medication 100 MG: at 08:03

## 2019-03-31 RX ADMIN — NICOTINE 1 PATCH: 21 PATCH, EXTENDED RELEASE TRANSDERMAL at 09:03

## 2019-03-31 RX ADMIN — PRAZOSIN HYDROCHLORIDE 2 MG: 1 CAPSULE ORAL at 08:03

## 2019-03-31 RX ADMIN — QUETIAPINE FUMARATE 300 MG: 300 TABLET ORAL at 08:03

## 2019-03-31 RX ADMIN — Medication 100 MG: at 09:03

## 2019-03-31 RX ADMIN — MIRTAZAPINE 30 MG: 30 TABLET, FILM COATED ORAL at 08:03

## 2019-03-31 RX ADMIN — THERA TABS 1 TABLET: TAB at 08:03

## 2019-03-31 RX ADMIN — FAMOTIDINE 20 MG: 20 TABLET ORAL at 09:03

## 2019-03-31 RX ADMIN — DIAZEPAM 10 MG: 5 TABLET ORAL at 02:03

## 2019-03-31 RX ADMIN — FAMOTIDINE 20 MG: 20 TABLET ORAL at 08:03

## 2019-03-31 RX ADMIN — FOLIC ACID 1 MG: 1 TABLET ORAL at 08:03

## 2019-03-31 NOTE — SUBJECTIVE & OBJECTIVE
"Interval History: unchanged from admit    Family History     None        Tobacco Use    Smoking status: Current Every Day Smoker     Packs/day: 1.00     Years: 10.00     Pack years: 10.00    Smokeless tobacco: Never Used   Substance and Sexual Activity    Alcohol use: Not on file    Drug use: Not on file    Sexual activity: Not on file     Psychotherapeutics (From admission, onward)    Start     Stop Route Frequency Ordered    03/30/19 2100  QUEtiapine tablet 300 mg      -- Oral Nightly 03/30/19 1032    03/30/19 2100  mirtazapine tablet 30 mg      -- Oral Nightly 03/30/19 1032    03/30/19 1200  diazePAM tablet 10 mg      -- Oral 3 times daily 03/30/19 1032    03/30/19 1032  haloperidol tablet 5 mg  (Med - Acute  Behavioral Management)      -- Oral Every 4 hours PRN 03/30/19 1032    03/30/19 1032  LORazepam tablet 2 mg  (Med - Acute  Behavioral Management)      -- Oral Every 4 hours PRN 03/30/19 1032    03/30/19 1032  haloperidol lactate injection 5 mg  (Med - Acute  Behavioral Management)      -- IM Every 4 hours PRN 03/30/19 1032    03/30/19 1032  lorazepam (ATIVAN) injection 2 mg  (Med - Acute  Behavioral Management)      -- IM Every 4 hours PRN 03/30/19 1032           Review of Systems   Constitutional: Positive for activity change.   Musculoskeletal: Negative for myalgias.   Neurological: Positive for tremors.   Psychiatric/Behavioral: Positive for dysphoric mood, sleep disturbance and suicidal ideas. The patient is nervous/anxious.      Objective:     Vital Signs (Most Recent):  Temp: 98 °F (36.7 °C) (03/31/19 0724)  Pulse: 101 (03/31/19 0724)  Resp: 18 (03/31/19 0724)  BP: (!) 126/90 (03/31/19 0724) Vital Signs (24h Range):  Temp:  [98 °F (36.7 °C)-98.7 °F (37.1 °C)] 98 °F (36.7 °C)  Pulse:  [] 101  Resp:  [16-18] 18  BP: (126-156)/(79-95) 126/90     Height: 5' 7" (170.2 cm)  Weight: 89.3 kg (196 lb 13.9 oz)  Body mass index is 30.83 kg/m².    No intake or output data in the 24 hours ending 03/31/19 " "0933    Physical Exam   Psychiatric:   EXAMINATION    CONSTITUTIONAL  General Appearance: under covers    MUSCULOSKELETAL  Muscle Strength and Tone: none noted  Abnormal Involuntary Movements: mild tremor to both hands  Gait and Station: not observed    PSYCHIATRIC MENTAL STATUS EXAM   Level of Consciousness: awake but sleepy  Orientation: not assessed  Grooming: fair  Psychomotor Behavior: slowed this morning  Speech: not pressured, soft  Language: no abnormalities  Mood: "depressed"  Affect: blunted  Thought Process: linear  Associations: intact  Thought Content: suicidal ideations upon admit; denies psychosis  Memory: intact to recent and remote  Attention: diminished this morning  Fund of Knowledge: intact for conversation  Insight: limited into alcohol use  Judgment: fair into cooperation with care          Significant Labs:   Last 24 Hours:   Recent Lab Results       03/31/19  0646   03/30/19  1047        Cholesterol 200  Comment:  The National Cholesterol Education Program (NCEP) has set the  following guidelines (reference ranges) for Cholesterol:  Optimal.....................<200 mg/dL  Borderline High.............200-239 mg/dL  High........................> or = 240 mg/dL         Estimated Avg Glucose   103     Folate   19.3     HDL 48  Comment:  The National Cholesterol Education Program (NCEP) has set the  following guidelines (reference values) for HDL Cholesterol:  Low...............<40 mg/dL  Optimal...........>60 mg/dL         HDL/Chol Ratio 24.0       Hemoglobin A1C External   5.2  Comment:  ADA Screening Guidelines:  5.7-6.4%  Consistent with prediabetes  >or=6.5%  Consistent with diabetes  High levels of fetal hemoglobin interfere with the HbA1C  assay. Heterozygous hemoglobin variants (HbS, HgC, etc)do  not significantly interfere with this assay.   However, presence of multiple variants may affect accuracy.       LDL Cholesterol 123.6  Comment:  The National Cholesterol Education Program (NCEP) " has set the  following guidelines (reference values) for LDL Cholesterol:  Optimal.......................<130 mg/dL  Borderline High...............130-159 mg/dL  High..........................160-189 mg/dL  Very High.....................>190 mg/dL         Non-HDL Cholesterol 152  Comment:  Risk category and Non-HDL cholesterol goals:  Coronary heart disease (CHD)or equivalent (10-year risk of CHD >20%):  Non-HDL cholesterol goal     <130 mg/dL  Two or more CHD risk factors and 10-year risk of CHD <= 20%:  Non-HDL cholesterol goal     <160 mg/dL  0 to 1 CHD risk factor:  Non-HDL cholesterol goal     <190 mg/dL         Total Cholesterol/HDL Ratio 4.2       Triglycerides 142  Comment:  The National Cholesterol Education Program (NCEP) has set the  following guidelines (reference values) for triglycerides:  Normal......................<150 mg/dL  Borderline High.............150-199 mg/dL  High........................200-499 mg/dL         Vitamin B-12   429         All pertinent labs within the past 24 hours have been reviewed.    Significant Imaging: I have reviewed all pertinent imaging results/findings within the past 24 hours.

## 2019-03-31 NOTE — ASSESSMENT & PLAN NOTE
A:  - Patient with current symptoms anxiety, tremors, insomnia  - in ER received ativan 2 mg PO @ 0032 and valium 10 mg IM @ 0832  - mild tachy overnight to 104 but most recent vitals at 730 AM day of admit WNL (HR 80, /80)  - patient with concerning seizure-like activity reported yesterday, but denies hx known seizures otherwise  - EtOH 274 on arrival to ER (3/29/19 evening)    P:  - received benzos as above in ER  - will start valium taper        Day 1 : valium 10 mg PO QID (will get TID today as he received 10 mg IM in ER which will sub for first dose)        Day 2 : valium 10 mg PO TID        Day 3 : valium 10 mg PO qAM, 5 at lunch, 10 qhs        Day 4 : valium 10 mg PO BID        Day 5 : valium 5 mg PO BID        Day 6 : valium 5 mg PO qam    - ativan 2 mg PO q4h PRN for HR or DBP > 100  - ativan 2 mg IM PRN for seizures

## 2019-03-31 NOTE — PROGRESS NOTES
"[facility]  Psychiatry  Progress Note    Patient Name: David Madrid  MRN: [id number]   Code Status: Full Code  Admission Date: 3/30/2019  Hospital Length of Stay: 1 days  Expected Discharge Date:   Attending Physician: Pedrito Ellis MD  Primary Care Provider: Primary Doctor No    Current Legal Status: PEC    Patient information was obtained from patient, chart review, nursing,  and ER records.     Subjective:     Principal Problem:<principal problem not specified>    Chief Complaint: suicidal, depression, alcohol use    HPI: Per ER RN:  "Pt presents to ED with suicidal ideation since returning from [place] in 2001. Pt states that he thinks about committing suicide daily and even dreams about killing himself. Pt states that he has severe PTSD for time in the  and that he wakes up several times a night due to nightmares. Pt states that he has a plan to kill himself, but he has never acted upon thoughts because he has a son. Pt states that he has been abusing ETOH for the past few yrs and drinks at least 1 pint of liquor daily and smokes about 1 pck of cigarettes daily as well. Pt states that today he woke up on the floor and was tremoring due to withdrawal from alcohol which led him to drink 1 pint of whiskey and a few beers. Pt states that today he tried to get into a facility to complete a 28 day detox, but was denied. Pt reports that he has gone through two detox programs in the past few yrs. Pt is prescribed Seroquel 300 mg for depression, but reports that he doesn't take it as prescribed because he doesn't like the way it makes him feel. Pt denies illicit drug use. Pt states that he does have family support system and that he no longer works due to being a disabled . Pt AAOx4 and ambulates independently with steady gait.  "     Per ER MD:  "48 y/o M with a medical history of depression, alcohol abuse and Hepatitis C presents with suicidal intention. 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 Patient states that he normally drinks 12 beers a day but today he wanted to kill himself by "drinking himself to death". He drank 1 pint of whiskey and 6 beers. States he hasn't eaten in the past 3 days. Patient is originally from [place] and moved down here after he met a woman online. He had a fight with his girlfriend last week, which caused him to spiral. Patient is on seroquel 300 mg, rameron 30 mg and zantec.  "     ========     Patient alert, sitting in chair on entry to room with sitter at bedside. Calm, appropriate, and linear throughout with constricted affect. No objective signs psychosis or mansi during interview. Mansi screen negative re past hx. No history AVH, no current. Patient reports he arrived to the ER for "suicidal thoughts and alcohol detox". Patient reports SI over the past two years with progressive increase in frequency of SI, daily for the past two weeks. Says SI worsened after recent breakup with girlfriend 2 weeks ago. Describes intermittent SI throughout his life, but notes significant worsening after returning from  duty in [place] in 2001. Says since then SI has been off and on, and reports PTSD symptoms since that time of nightmares, night terrors, flashbacks, and exaggerated startle response. Says experiences nightmares nightly, though later in interview when discussing prazosin reports relief of these nightmares when taken. Denies any previous suicide attempts but does report instances of trying to "drink [himself] to death". Denies current plan, reports he did have a plan formulated last year for [id number]. Patient reports depressed mood x2 weeks after break up with girlfriend. Endorses neurovegetative symptoms of poor sleep (trouble falling, staying, + PTSD symptoms), anhedonia (previously enjoyed fishing), guilt and hopelessness ("shame" over current situation, " "hopelessness to future with notable lack of future-orientation when asked further), poor appetite with 10 lb weight loss over two weeks, and recent SI - 5/9 SIGECAPSD x2 weeks.      Patient reports he drinks alcohol daily, endorses rare marijuana use and denies other illicits. Patient says he usually drinks 2-3 beers daily, but describes binge-pattern behavior. Says over the past two days he increased his daily drinking to "at least a 12 pack and a pint of Kalyan Fonseca". Last drink was yesterday ~5 PM tail-end of a binge similar to level just described. Says this is typical for when he starts to binge. Patient was in group home last week for domestic battery charges after him and girlfriend got into an argument, reports this was his last detox - was without medication, patient says it took 4 days for symptoms to subside and cites tremors, night sweats, insomnia, and jitters. Patient last went to inpatient rehab last year for alcohol, says "I've been trying to kick this habit a long time". Describes his alcohol use as "waxing and waning" consistent with binge drinking. Patient denies hx of known seizures but alarmingly reports that yesterday he had an episode where he fell, was shaking and bit his tongue. Denies incontinence during the episode.     Patient reports he has been on seroquel 300 mg PO qhs, remeron 30 mg PO qhs, and prazosin 2 mg PO qhs for several years. First filled in [place] but patient now gets them filled by a Dr. Bee on the [facility].     Past Psychiatric History:  Previous Medication Trials: seroquel , remeron, prazosin  Previous Psychiatric Hospitalizations: once, about a year ago in [place] for SI w/ no attempt  Previous Suicide Attempts: denied, though reports attempts to drink himself to death  History of Violence: denied  Outpatient psychiatrist: Dr. Bee on the [facility]      Social History:  Marital Status: single (recent breakup)  Children: 4 adult children  Source of Income: VA benefits and " intermittent work ()  Education: HS grad, associates degree [employer]  Special Ed: denied  Housing Status: living with a friend   History of phys/sexual abuse: denied  Access to gun: denied     Substance Use  Recreational Drugs: rare marijuana, denied others  Use of Alcohol: yes, heavy, see HPI  Tobacco Use: 1 ppd  Rehab History: 2x for alcohol, most recent last year inpatient  H/O Complicated Withdrawal: likely - denies known hx seizures but reports seizure like activity yesterday as noted in HPI      Legal History:  Past Charges/Incarcerations: yes , 6 1/2 years for drug charges  Pending charges: yes, domestic battery charges pending     Family Psychiatric History:  denied        Hospital Course: 03/31/2019 - Patient seen in his bed with covers over his head today.  He does engage in conversation but is noted to be sleepy and doesn't talk much.  He states that his stomach is hurting today and that he has taken Zantac in the past.  He is also endorsing that he is shaky.  Nursing reports that he has been calm and cooperative and yesterday told nursing that he was sweaty and shaky.  Upon evaluation at that time, he was mildly tremulous but not noted to be sweaty.  He did have a mildly elevated blood pressure yesterday of 156/95 but today is 126/90 with a heart rate of 101.  Has not required any lorazepam PRN for withdrawal symptoms.  It is reported that he slept last night.  He is also asking for a nicotine patch.    Interval History: unchanged from admit    Family History     None        Tobacco Use    Smoking status: Current Every Day Smoker     Packs/day: 1.00     Years: 10.00     Pack years: 10.00    Smokeless tobacco: Never Used   Substance and Sexual Activity    Alcohol use: Not on file    Drug use: Not on file    Sexual activity: Not on file     Psychotherapeutics (From admission, onward)    Start     Stop Route Frequency Ordered    03/30/19 2100  QUEtiapine tablet 300 mg      -- Oral Nightly  "03/30/19 1032    03/30/19 2100  mirtazapine tablet 30 mg      -- Oral Nightly 03/30/19 1032    03/30/19 1200  diazePAM tablet 10 mg      -- Oral 3 times daily 03/30/19 1032    03/30/19 1032  haloperidol tablet 5 mg  (Med - Acute  Behavioral Management)      -- Oral Every 4 hours PRN 03/30/19 1032    03/30/19 1032  LORazepam tablet 2 mg  (Med - Acute  Behavioral Management)      -- Oral Every 4 hours PRN 03/30/19 1032    03/30/19 1032  haloperidol lactate injection 5 mg  (Med - Acute  Behavioral Management)      -- IM Every 4 hours PRN 03/30/19 1032    03/30/19 1032  lorazepam (ATIVAN) injection 2 mg  (Med - Acute  Behavioral Management)      -- IM Every 4 hours PRN 03/30/19 1032           Review of Systems   Constitutional: Positive for activity change.   Musculoskeletal: Negative for myalgias.   Neurological: Positive for tremors.   Psychiatric/Behavioral: Positive for dysphoric mood, sleep disturbance and suicidal ideas. The patient is nervous/anxious.      Objective:     Vital Signs (Most Recent):  Temp: 98 °F (36.7 °C) (03/31/19 0724)  Pulse: 101 (03/31/19 0724)  Resp: 18 (03/31/19 0724)  BP: (!) 126/90 (03/31/19 0724) Vital Signs (24h Range):  Temp:  [98 °F (36.7 °C)-98.7 °F (37.1 °C)] 98 °F (36.7 °C)  Pulse:  [] 101  Resp:  [16-18] 18  BP: (126-156)/(79-95) 126/90     Height: 5' 7" (170.2 cm)  Weight: 89.3 kg (196 lb 13.9 oz)  Body mass index is 30.83 kg/m².    No intake or output data in the 24 hours ending 03/31/19 0933    Physical Exam   Psychiatric:   EXAMINATION    CONSTITUTIONAL  General Appearance: under covers    MUSCULOSKELETAL  Muscle Strength and Tone: none noted  Abnormal Involuntary Movements: mild tremor to both hands  Gait and Station: not observed    PSYCHIATRIC MENTAL STATUS EXAM   Level of Consciousness: awake but sleepy  Orientation: not assessed  Grooming: fair  Psychomotor Behavior: slowed this morning  Speech: not pressured, soft  Language: no abnormalities  Mood: " ""depressed"  Affect: blunted  Thought Process: linear  Associations: intact  Thought Content: suicidal ideations upon admit; denies psychosis  Memory: intact to recent and remote  Attention: diminished this morning  Fund of Knowledge: intact for conversation  Insight: limited into alcohol use  Judgment: fair into cooperation with care          Significant Labs:   Last 24 Hours:   Recent Lab Results       03/31/19  0646   03/30/19  1047        Cholesterol 200  Comment:  The National Cholesterol Education Program (NCEP) has set the  following guidelines (reference ranges) for Cholesterol:  Optimal.....................<200 mg/dL  Borderline High.............200-239 mg/dL  High........................> or = 240 mg/dL         Estimated Avg Glucose   103     Folate   19.3     HDL 48  Comment:  The National Cholesterol Education Program (NCEP) has set the  following guidelines (reference values) for HDL Cholesterol:  Low...............<40 mg/dL  Optimal...........>60 mg/dL         HDL/Chol Ratio 24.0       Hemoglobin A1C External   5.2  Comment:  ADA Screening Guidelines:  5.7-6.4%  Consistent with prediabetes  >or=6.5%  Consistent with diabetes  High levels of fetal hemoglobin interfere with the HbA1C  assay. Heterozygous hemoglobin variants (HbS, HgC, etc)do  not significantly interfere with this assay.   However, presence of multiple variants may affect accuracy.       LDL Cholesterol 123.6  Comment:  The National Cholesterol Education Program (NCEP) has set the  following guidelines (reference values) for LDL Cholesterol:  Optimal.......................<130 mg/dL  Borderline High...............130-159 mg/dL  High..........................160-189 mg/dL  Very High.....................>190 mg/dL         Non-HDL Cholesterol 152  Comment:  Risk category and Non-HDL cholesterol goals:  Coronary heart disease (CHD)or equivalent (10-year risk of CHD >20%):  Non-HDL cholesterol goal     <130 mg/dL  Two or more CHD risk factors " and 10-year risk of CHD <= 20%:  Non-HDL cholesterol goal     <160 mg/dL  0 to 1 CHD risk factor:  Non-HDL cholesterol goal     <190 mg/dL         Total Cholesterol/HDL Ratio 4.2       Triglycerides 142  Comment:  The National Cholesterol Education Program (NCEP) has set the  following guidelines (reference values) for triglycerides:  Normal......................<150 mg/dL  Borderline High.............150-199 mg/dL  High........................200-499 mg/dL         Vitamin B-12   429         All pertinent labs within the past 24 hours have been reviewed.    Significant Imaging: I have reviewed all pertinent imaging results/findings within the past 24 hours.    Assessment/Plan:     GERD (gastroesophageal reflux disease)  Patient with history of GERD.  Normally takes Zantac (not on formulary).  Will start famotidine 20mg BID.    Alcohol use disorder, severe, dependence  Long history of alcohol use with a dependence and withdrawal.  Affecting mental health.  Needs rehab and counseling resources to follow up as outpatient.    PTSD (post-traumatic stress disorder)  A:  - symptoms after returning from  duty in [place] in 2001  - endorses symptoms of nightmares, night terrors, exaggerated startle  - reports prazosin 2 mg PO nightly has helped alleviate night symptoms     P:  - continue prazosin 2 mg PO nightly  - monitor BP (stable)        Alcohol withdrawal  A:  - Patient with current symptoms anxiety, tremors, insomnia  - in ER received ativan 2 mg PO @ 0032 and valium 10 mg IM @ 0832  - mild tachy overnight to 104 but most recent vitals at 730 AM day of admit WNL (HR 80, /80)  - patient with concerning seizure-like activity reported yesterday, but denies hx known seizures otherwise  - EtOH 274 on arrival to ER (3/29/19 evening)    P:  - received benzos as above in ER  - will start valium taper        Day 1 : valium 10 mg PO QID (will get TID today as he received 10 mg IM in ER which will sub for first dose)         Day 2 : valium 10 mg PO TID        Day 3 : valium 10 mg PO qAM, 5 at lunch, 10 qhs        Day 4 : valium 10 mg PO BID        Day 5 : valium 5 mg PO BID        Day 6 : valium 5 mg PO qam    - ativan 2 mg PO q4h PRN for HR or DBP > 100  - ativan 2 mg IM PRN for seizures      Substance induced mood disorder  A:  - Vs MDD  - Patient with long history of binge drinking behavior  - recent binge drinking, appears triggered by recent stressors including breakup with girlfriend  - Patient with 5/9 SIGECAPSD x2 weeks. Recent, persistent, and frequent SI reported.  - Patient amenable to inpatient psychiatric placement, consider FVA after admit     P:  - continue home meds (seroquel 300 mg PO nightly, remeron 30 mg PO nightly, prazosin 2 mg PO nightly), patient reports good efficacy  - Consider discontinuing remeron in favor of SSRI, but at this time will keep remeron on board given patient's recent weight loss w/ poor appetite and insomnia.   - Defer above decision to primary team after patient begins detox, hopefully regains sleep and appetite over the weekend.             Need for Continued Hospitalization:   Psychiatric illness continues to pose a potential threat to life or bodily function, of self or others, thereby requiring the need for continued inpatient psychiatric hospitalization., Protective inpatient psychiatric hospitalization required while a safe disposition plan is enacted. and Requires ongoing hospitalization for stabilization of medications.    Anticipated Disposition: Home or Self Care     Total time:  25 with greater than 50% of this time spent in counseling and/or coordination of care.       Hector Craft MD   Psychiatry  [facility]

## 2019-03-31 NOTE — ASSESSMENT & PLAN NOTE
Long history of alcohol use with a dependence and withdrawal.  Affecting mental health.  Needs rehab and counseling resources to follow up as outpatient.

## 2019-03-31 NOTE — PROGRESS NOTES
CORRECTION     03/31/19 0900 03/31/19 1000 03/31/19 1100   [facility] Group Therapy   Group Name Community Reintegration Mental Awareness Stress Management   Specific Interventions Current Events   (name game) Guided Imagery/Relaxation   Participation Level None None None   Participation Quality Sleeping Sleeping Sleeping   Insight/Motivation  --   --   --    Affect/Mood Display  --   --   --    Cognition  --   --   --       03/31/19 1330 03/31/19 1400   [facility] Group Therapy   Group Name Education Therapeutic Recreation   Specific Interventions Coping Skills Training   (basketball toss)   Participation Level None Active;Appropriate   Participation Quality Refused Cooperative   Insight/Motivation  --  Good   Affect/Mood Display  --  Appropriate   Cognition  --  Alert;Oriented

## 2019-03-31 NOTE — PLAN OF CARE
Problem: Adult Behavioral Health Plan of Care  Goal: Plan of Care Review  Outcome: Ongoing (interventions implemented as appropriate)  POC discussed with pt, calm and cooperative on the unit. Follows direction and attends some groups with active participation. Med compliant, good hygiene,and god appetite. Denies SI/HI/AVH, bright affect, thoughts are linear. Mood is good.Pt has been in bed most of the day c/o feeling tired. Safety plan reviewed and environmental rounds done. Reviewed medicine with pt will require further instruction. Pt given time to ask questions, all questions answered. MVC in place will continue to monitor.     Problem: Physiologic Impairment (Excessive Substance Use)  Goal: Improved Physiologic Symptoms (Excessive Substance Use)  Outcome: Ongoing (interventions implemented as appropriate)  Pt vital signs are being monitored every 4 hours, he continues on the Valium taper.

## 2019-03-31 NOTE — PROGRESS NOTES
03/31/19 0900 03/31/19 1000 03/31/19 1100   [facility] Group Therapy   Group Name Community Reintegration Mental Awareness Stress Management   Specific Interventions Current Events   (name game) Guided Imagery/Relaxation   Participation Level Active;Appropriate Active;Appropriate Attentive;Appropriate   Participation Quality Cooperative;Social Cooperative;Social Cooperative   Insight/Motivation Good Good Good   Affect/Mood Display Appropriate Appropriate Appropriate   Cognition Alert;Oriented Alert;Oriented Alert;Oriented      03/31/19 1330 03/31/19 1400   [facility] Group Therapy   Group Name Education Therapeutic Recreation   Specific Interventions Coping Skills Training   (basketball toss)   Participation Level None Active;Appropriate   Participation Quality Refused Cooperative;Social   Insight/Motivation  --  Good   Affect/Mood Display  --  Appropriate   Cognition  --  Alert;Oriented

## 2019-03-31 NOTE — NURSING
"The patient was recv'd out in the milieu. He was casually attired w/ fair grooming & hygiene. His affect was blunted, mood appeared guarded. He denied S/HI, A/VH. When asked how he was feeling he stated, "rocking & rolling, sweatin'." Slight tremors  Noted to hands, skin is warm & dry to touch. When queried he states that he was positive for withdrawal seizures, DT's. He denied S/HI, A/\/H. He is maintained on MVC, fall precautions. Supportive milieu maintained.  "

## 2019-03-31 NOTE — ASSESSMENT & PLAN NOTE
Patient with history of GERD.  Normally takes Zantac (not on formulary).  Will start famotidine 20mg BID.

## 2019-03-31 NOTE — PLAN OF CARE
Problem: Adult Behavioral Health Plan of Care  Goal: Plan of Care Review  The patient appears to be resting well, no signs of restlessness noted. He was out in the milieu during evening hours. He was attired in personal casual wear w/ fair grooming & hygiene. His affect was blunted, mood guarded. He did not show any signs of withdrawals but stated that he was shaking w/ mild tremors noted, skin remained warm & dry. He was informed to notify the staff immediately for any untoward s/s. He stated that he experienced withdrawal seizures & DT's in the past. He denied S/HI, A/VH. He is maintained on MVC, fall precaution w/ a supportive milieu maintained.

## 2019-04-01 LAB — HCV AB SERPL QL IA: POSITIVE

## 2019-04-01 PROCEDURE — S4991 NICOTINE PATCH NONLEGEND: HCPCS | Performed by: PSYCHIATRY & NEUROLOGY

## 2019-04-01 PROCEDURE — 25000003 PHARM REV CODE 250: Performed by: PSYCHIATRY & NEUROLOGY

## 2019-04-01 PROCEDURE — 99233 SBSQ HOSP IP/OBS HIGH 50: CPT | Mod: ,,, | Performed by: PSYCHIATRY & NEUROLOGY

## 2019-04-01 PROCEDURE — 99233 PR SUBSEQUENT HOSPITAL CARE,LEVL III: ICD-10-PCS | Mod: ,,, | Performed by: PSYCHIATRY & NEUROLOGY

## 2019-04-01 PROCEDURE — 86803 HEPATITIS C AB TEST: CPT

## 2019-04-01 PROCEDURE — 12400001 HC PSYCH SEMI-PRIVATE ROOM

## 2019-04-01 PROCEDURE — 36415 COLL VENOUS BLD VENIPUNCTURE: CPT

## 2019-04-01 RX ORDER — DIAZEPAM 5 MG/1
10 TABLET ORAL 2 TIMES DAILY
Status: DISCONTINUED | OUTPATIENT
Start: 2019-04-01 | End: 2019-04-02

## 2019-04-01 RX ORDER — PRAZOSIN HYDROCHLORIDE 1 MG/1
3 CAPSULE ORAL NIGHTLY
Status: DISCONTINUED | OUTPATIENT
Start: 2019-04-01 | End: 2019-04-05 | Stop reason: HOSPADM

## 2019-04-01 RX ORDER — DIAZEPAM 5 MG/1
5 TABLET ORAL
Status: DISCONTINUED | OUTPATIENT
Start: 2019-04-01 | End: 2019-04-02

## 2019-04-01 RX ORDER — LORAZEPAM 1 MG/1
2 TABLET ORAL EVERY 4 HOURS PRN
Status: DISCONTINUED | OUTPATIENT
Start: 2019-04-01 | End: 2019-04-05 | Stop reason: HOSPADM

## 2019-04-01 RX ADMIN — THERA TABS 1 TABLET: TAB at 08:04

## 2019-04-01 RX ADMIN — FAMOTIDINE 20 MG: 20 TABLET ORAL at 08:04

## 2019-04-01 RX ADMIN — PRAZOSIN HYDROCHLORIDE 3 MG: 1 CAPSULE ORAL at 08:04

## 2019-04-01 RX ADMIN — MIRTAZAPINE 30 MG: 30 TABLET, FILM COATED ORAL at 08:04

## 2019-04-01 RX ADMIN — NICOTINE 1 PATCH: 21 PATCH, EXTENDED RELEASE TRANSDERMAL at 08:04

## 2019-04-01 RX ADMIN — Medication 100 MG: at 08:04

## 2019-04-01 RX ADMIN — DIAZEPAM 10 MG: 5 TABLET ORAL at 08:04

## 2019-04-01 RX ADMIN — DIAZEPAM 5 MG: 5 TABLET ORAL at 12:04

## 2019-04-01 RX ADMIN — FOLIC ACID 1 MG: 1 TABLET ORAL at 08:04

## 2019-04-01 RX ADMIN — QUETIAPINE FUMARATE 300 MG: 300 TABLET ORAL at 08:04

## 2019-04-01 NOTE — PROGRESS NOTES
04/01/19 1445   [facility] Group Therapy   Group Name Exercise   Specific Interventions Remotivation   Participation Level Active;Supportive;Appropriate   Participation Quality Cooperative   Insight/Motivation Good   Affect/Mood Display Appropriate   Cognition Alert

## 2019-04-01 NOTE — PLAN OF CARE
Acute Psychiatric Unit  Psychosocial History and Assessment  Progress Note      Patient Name: David Madrid YOB: 1975 SW: Sharon Wray, RSW Date: 3/31/2019    Chief Complaint: suicidal ideation    Consent:     Did the patient consent for an interview with the ? Yes    Did the patient consent for the  to contact family/friend/caregiver?   Yes  Name: Maribeth Jha, Relationship: Girlfriend  and Contact: [phone]    Did the patient give consent for the  to inform family/friend/caregiver of his/her whereabouts or to discuss discharge planning? Yes    Source of Information: Chart review    Is information obtained from interviews considered reliable?   yes    Reason for Admission:     Active Hospital Problems    Diagnosis  POA    GERD (gastroesophageal reflux disease) [K21.9]  Yes    Substance induced mood disorder [F19.94]  Yes    Alcohol withdrawal [F10.239]  Yes     A:  - Patient with current symptoms anxiety, tremors, insomnia  - in ER received ativan 2 mg PO @ 0032 and valium 10 mg IM @ 0832  - mild tachy overnight to 104 but most recent vitals at 730 AM day of admit WNL (HR 80, /80)  - patient with concerning seizure-like activity reported yesterday, but denies hx known seizures otherwise  - EtOH 274 on arrival to ER (3/29/19 evening)    P:  - received benzos as above in ER  - will start valium taper        Day 1 : valium 10 mg PO QID (will get TID today as he received 10 mg IM in ER which will sub for first dose)        Day 2 : valium 10 mg PO TID        Day 3 : valium 10 mg PO qAM, 5 at lunch, 10 qhs        Day 4 : valium 10 mg PO BID        Day 4 : valium 5 mg PO BID        Day 5 : valium 5 mg PO qam    - ativan 2 mg PO q4h PRN for HR or DBP > 100  - ativan 2 mg IM PRN for seizures        PTSD (post-traumatic stress disorder) [F43.10]  Yes    Alcohol use disorder, severe, dependence [F10.20]  Yes     See note        Resolved Hospital  Problems   No resolved problems to display.       History of Present Illness - (Patient Perception):   Patient stated he wanted to harm himself, due to a recent break up with his  girlfriend, and varies family issues.     History of Present Illness - (Perception of Others):  according to h&p: patient expressed suicidal ideation since returning from [place] in 2001. Pt states that he thinks about committing suicide daily and even dreams about killing himself.     Present biopsychosocial functioning: Pt states that he has severe PTSD. Patient stated he had an argument with his girlfriend, which lead to a break up.  Patient also stated he is trying to cope with the stress of his father being in a nursing home and his mom alone at home in [place].     Past biopsychosocial functioning: Patient served in the  [employer],  And completed 1.5 years in  [place].  upon returning patient stated he was diagnosed with PTSD.Patient has 4 children.    Family and Marital/Relationship History:     Significant Other/Partner Relationships:  Single Recent breakup and :     Family Relationships: Intact    Culture and Orthodoxy:     Orthodoxy: Eneida  How strong of a role does Sikh and spirituality play in patient's life? medium   Christian or spiritual concerns regarding treatment:none    History of Abuse:   History of Abuse: Denies      Outcome: none    Psychiatric and Medical History:     History of psychiatric illness or treatment: prior inpatient treatment and currently under psychiatric care    Medical history:   Past Medical History:   Diagnosis Date    Alcohol abuse     Depression     Hepatitis C        Substance Abuse History:     Alcohol - (Patient Perspective): patient endorsed drinking daily for the last few months   Social History     Substance and Sexual Activity   Alcohol Use Not on file     Alcohol - (Collateral Perspective):unable to access  Drugs - (Patient Perspective): patient endorsed using marijuana when he  consumes alcohol.    Social History     Substance and Sexual Activity   Drug Use Not on file     Drugs - (Collateral Perspective): unable to access    Additional Comments: unable to access    Education:     Currently Enrolled? No  High School (9-12) or GEDAssociate degree culQUIQ arts    Special Education? No    Interested in Completing Education/GED: No    Employment and Financial:     Currently employed? Unemployed     Source of Income: [employer]/p[employer], [employer], intermittent work    Able to afford basic needs (food, shelter, utilities)? Patient states his fiances are barley enough to cover his expenses.   Who manages finances/personal affairs? patient      Service:     [place]? Yes, [employer]  Combat Service? Yes     Community Resources:     Describe present use of community resources:  Dr. Lee, [facility]    Identify previously used community resources   Dr.Lee, [facility], [facility], [facility], [place]    Environmental:     Current living situation:Lives was  with friend    Social Evaluation:     Patient Assets: Motivation for treatment/growth    Patient Limitations: Patient has a recent break up, substance addiction, and pending legal charges, patient has a court date scheduled for 04-    High risk psychosocial issues that may impact discharge planning:    Patient has pending legal charges  Recommendations:     Anticipated discharge plan:   Out patient substance abuse treatment    High risk issues requiring early treatment planning and immediate intervention: Patient has pending legal charges that may hinder his ability to participate in inpatient treatment     Community resources needed for discharge planning:  aftercare treatment sources    Anticipated social work role(s) in treatment and discharge planning: assist in securing inpatient substance abuse treatment, group therapy and activities, other needed or requested patient needs.

## 2019-04-01 NOTE — ASSESSMENT & PLAN NOTE
Long history of alcohol use with a dependence and withdrawal.  Affecting mental health.  Needs rehab and counseling resources to follow up as outpatient. Motivated for sobriety. Showing good insight

## 2019-04-01 NOTE — ASSESSMENT & PLAN NOTE
- Patient with current symptoms anxiety, tremors, insomnia  - in ER received Ativan 2 mg PO @ 0032 and Valium 10 mg IM @ 0832  - mild tachy last night at 101 but most recent vitals improved (HR 94, /90)  - patient with concerning seizure-like activity reported Friday prior to admit, but denies hx known seizures otherwise  - EtOH 274 on arrival to ER (3/29/19 evening)    - received BZD as above in ER  - Valium taper initiated         Day 1 : valium 10 mg PO QID (got TID 3/30 as he received 10 mg IM in ER which will sub for first dose)         Day 2 : valium 10 mg PO TID on 3/31        Day 3 : valium 10 mg PO qAM, 5 at lunch, 10 qhs on 4/1        Day 4 : valium 10 mg PO BID on 4/2        Day 5 : valium 5 mg PO BID on 4/3        Day 6 : valium 5 mg PO qam on 4/4    - Ativan 2 mg PO q4h PRN for HR or DBP > 100  - Ativan 2 mg IM PRN for seizures

## 2019-04-01 NOTE — PLAN OF CARE
Problem: Adult Behavioral Health Plan of Care  Goal: Plan of Care Review  Outcome: Ongoing (interventions implemented as appropriate)  Pt is irritable and withdrawn. Pt reports withdrawal symptoms. At the time, pt has mild tremors to hands. VSS, compliant with q4 vitals. Pt educated on plan of care. He is encouraged to attend groups and activities. Pt refuses all interaction with peers, minimal interactions with staff. As the day progresses, pt out in the milieu, less irritable, socializing. Denies SI/HI/AV hallucinations.

## 2019-04-01 NOTE — PROGRESS NOTES
"[facility]  Psychiatry  Progress Note    Patient Name: David Madrid  MRN: [id number]   Code Status: Full Code  Admission Date: 3/30/2019  Hospital Length of Stay: 2 days  Expected Discharge Date:   Attending Physician: Pedrito Ellis MD  Primary Care Provider: Primary Doctor No    Current Legal Status: FVA    Patient information was obtained from patient, RNs, and ER records.     Subjective:     Principal Problem:Substance induced mood disorder    Chief Complaint: suicidal, depression, alcohol use    HPI: Per ER RN:  "Pt presents to ED with suicidal ideation since returning from [place] in 2001. Pt states that he thinks about committing suicide daily and even dreams about killing himself. Pt states that he has severe PTSD for time in the  and that he wakes up several times a night due to nightmares. Pt states that he has a plan to kill himself, but he has never acted upon thoughts because he has a son. Pt states that he has been abusing ETOH for the past few yrs and drinks at least 1 pint of liquor daily and smokes about 1 pck of cigarettes daily as well. Pt states that today he woke up on the floor and was tremoring due to withdrawal from alcohol which led him to drink 1 pint of whiskey and a few beers. Pt states that today he tried to get into a facility to complete a 28 day detox, but was denied. Pt reports that he has gone through two detox programs in the past few yrs. Pt is prescribed Seroquel 300 mg for depression, but reports that he doesn't take it as prescribed because he doesn't like the way it makes him feel. Pt denies illicit drug use. Pt states that he does have family support system and that he no longer works due to being a disabled . Pt AAOx4 and ambulates independently with steady gait.  "     Per ER MD:  "48 y/o M with a medical history of depression, alcohol abuse and Hepatitis C presents with suicidal intention. 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 Patient states that he normally drinks 12 beers a day but today he wanted to kill himself by "drinking himself to death". He drank 1 pint of whiskey and 6 beers. States he hasn't eaten in the past 3 days. Patient is originally from [place] and moved down here after he met a woman online. He had a fight with his girlfriend last week, which caused him to spiral. Patient is on seroquel 300 mg, rameron 30 mg and zantec.  "     ========     Patient alert, sitting in chair on entry to room with sitter at bedside. Calm, appropriate, and linear throughout with constricted affect. No objective signs psychosis or mansi during interview. Mansi screen negative re past hx. No history AVH, no current. Patient reports he arrived to the ER for "suicidal thoughts and alcohol detox". Patient reports SI over the past two years with progressive increase in frequency of SI, daily for the past two weeks. Says SI worsened after recent breakup with girlfriend 2 weeks ago. Describes intermittent SI throughout his life, but notes significant worsening after returning from  duty in [place] in 2001. Says since then SI has been off and on, and reports PTSD symptoms since that time of nightmares, night terrors, flashbacks, and exaggerated startle response. Says experiences nightmares nightly, though later in interview when discussing prazosin reports relief of these nightmares when taken. Denies any previous suicide attempts but does report instances of trying to "drink [himself] to death". Denies current plan, reports he did have a plan formulated last year for [id number]. Patient reports depressed mood x2 weeks after break up with girlfriend. Endorses neurovegetative symptoms of poor sleep (trouble falling, staying, + PTSD symptoms), anhedonia (previously enjoyed fishing), guilt and hopelessness ("shame" over current situation, hopelessness to future with notable " "lack of future-orientation when asked further), poor appetite with 10 lb weight loss over two weeks, and recent SI - 5/9 SIGECAPSD x2 weeks.      Patient reports he drinks alcohol daily, endorses rare marijuana use and denies other illicits. Patient says he usually drinks 2-3 beers daily, but describes binge-pattern behavior. Says over the past two days he increased his daily drinking to "at least a 12 pack and a pint of Kalyan Fonseca". Last drink was yesterday ~5 PM tail-end of a binge similar to level just described. Says this is typical for when he starts to binge. Patient was in [facility] last week for domestic battery charges after him and girlfriend got into an argument, reports this was his last detox - was without medication, patient says it took 4 days for symptoms to subside and cites tremors, night sweats, insomnia, and jitters. Patient last went to inpatient rehab last year for alcohol, says "I've been trying to kick this habit a long time". Describes his alcohol use as "waxing and waning" consistent with binge drinking. Patient denies hx of known seizures but alarmingly reports that yesterday he had an episode where he fell, was shaking and bit his tongue. Denies incontinence during the episode.     Patient reports he has been on seroquel 300 mg PO qhs, remeron 30 mg PO qhs, and prazosin 2 mg PO qhs for several years. First filled in [place] but patient now gets them filled by a Dr. Bee on the [facility].     Past Psychiatric History:  Previous Medication Trials: seroquel , remeron, prazosin  Previous Psychiatric Hospitalizations: once, about a year ago in [place] for SI w/ no attempt  Previous Suicide Attempts: denied, though reports attempts to drink himself to death  History of Violence: denied  Outpatient psychiatrist: Dr. Bee on the [facility]      Social History:  Marital Status: single (recent breakup)  Children: 4 adult children  Source of Income: VA benefits and [employer] work ()  Education: HS " [employer], associates degree [employer]  Special Ed: denied  Housing Status: living with a friend   History of phys/sexual abuse: denied  Access to gun: denied     Substance Use  Recreational Drugs: rare marijuana, denied others  Use of Alcohol: yes, heavy, see HPI  Tobacco Use: 1 ppd  Rehab History: 2x for alcohol, most recent last year inpatient  H/O Complicated Withdrawal: likely - denies known hx seizures but reports seizure like activity yesterday as noted in HPI      Legal History:  Past Charges/Incarcerations: yes , 6 1/2 years for drug charges  Pending charges: yes, domestic battery charges pending     Family Psychiatric History:  denied        Hospital Course: Admitted to inpatient unit on 3/30. Upon admit, Valium taper initiated due to concern for withdrawal seizures, thiamine and folic acid were started, as were prazosin 2mg qHS, Remeron 30mg qHS, and Seroquel 300mg qHS. Patient complained of GERD symptoms so Pepcid started on 3/31. Patient continued to report withdrawal symptoms as of 4/1, but Valium reduced from 10mg TID to 10mg/5mg/10mg; prazosin increased to 3mg on 4/1 for continued PTSD nightmares (as well as elevated BP and tachycardia). Patient with good insight into his alcohol use disorder, and continues to desire residential rehab.    Interval History: Patient seen in treatment team room. Calm and cooperative with interview. Patient able to openly talk about his struggles with alcohol that led to acute suicidality which prompted his hospitalization. Voiced frustration about not being able to get into detox/rehab placements prior to hospitalization, but still focused on rehab after stabilization. Denies history of prior withdrawal seizures, but does report a seizure Friday night prior to presentation to hospital. States his longest period of sobriety has been 1 year, and is motivated to obtain sobriety again with possible goal of living in an [facility]; reports reduction in cravings when he was  on Vivitrol in the past. Lists stressors that have worsened mood recently as a fight with his girlfriend, increased alcohol use, and learning of HCV status. Denies any current SI/HI, intent, or plan. Continues to endorse PTSD symptoms of hypervigilance, flashbacks and nightmares, irritability, and depressed mood when having recurrent thoughts. Denies any AVH, but does report some tactile hallucinations last night. Stable appetite.     Family History     None        Tobacco Use    Smoking status: Current Every Day Smoker     Packs/day: 1.00     Years: 10.00     Pack years: 10.00    Smokeless tobacco: Never Used   Substance and Sexual Activity    Alcohol use: Not on file    Drug use: Not on file    Sexual activity: Not on file     Psychotherapeutics (From admission, onward)    Start     Stop Route Frequency Ordered    04/01/19 2100  diazePAM tablet 10 mg      04/03 0859 Oral 2 times daily 04/01/19 0944    04/01/19 1200  diazePAM tablet 5 mg      04/03 1159 Oral With lunch 04/01/19 0944    03/30/19 2100  QUEtiapine tablet 300 mg      -- Oral Nightly 03/30/19 1032    03/30/19 2100  mirtazapine tablet 30 mg      -- Oral Nightly 03/30/19 1032    03/30/19 1032  haloperidol tablet 5 mg  (Med - Acute  Behavioral Management)      -- Oral Every 4 hours PRN 03/30/19 1032    03/30/19 1032  LORazepam tablet 2 mg  (Med - Acute  Behavioral Management)      -- Oral Every 4 hours PRN 03/30/19 1032    03/30/19 1032  haloperidol lactate injection 5 mg  (Med - Acute  Behavioral Management)      -- IM Every 4 hours PRN 03/30/19 1032    03/30/19 1032  lorazepam (ATIVAN) injection 2 mg  (Med - Acute  Behavioral Management)      -- IM Every 4 hours PRN 03/30/19 1032           Review of Systems   Constitutional: Positive for activity change. Negative for appetite change.   Respiratory: Negative for cough and shortness of breath.    Cardiovascular: Negative for chest pain.   Gastrointestinal: Negative for constipation, diarrhea, nausea  "and vomiting.   Musculoskeletal: Negative for myalgias.   Neurological: Positive for tremors. Negative for headaches.   Psychiatric/Behavioral: Positive for dysphoric mood, hallucinations (tactile) and sleep disturbance. Negative for suicidal ideas.     Objective:     Vital Signs (Most Recent):  Temp: 98.2 °F (36.8 °C) (03/31/19 1929)  Pulse: 94 (04/01/19 0743)  Resp: 18 (04/01/19 0743)  BP: (!) 134/90 (04/01/19 0743) Vital Signs (24h Range):  Temp:  [98.2 °F (36.8 °C)] 98.2 °F (36.8 °C)  Pulse:  [] 94  Resp:  [18] 18  BP: (110-137)/(74-95) 134/90     Height: 5' 7" (170.2 cm)  Weight: 89.3 kg (196 lb 13.9 oz)  Body mass index is 30.83 kg/m².    No intake or output data in the 24 hours ending 04/01/19 1024    Physical Exam   Psychiatric:   Mental Status Exam:  Arousal: alert  Sensorium/Orientation: grossly intact  Behavior/Cooperation: normal, cooperative, eye contact normal   Psychomotor: unremarkable and within normal limits  Speech: conversational rate, tone, and volume  Language: answered questions appropriately  Mood: "okay"   Affect: constricted  Thought Process: linear, logical  Thought Content:   Auditory hallucinations: NO  Visual hallucinations: NO  Paranoia: YES: 2/2 PTSD     Delusions:  NO  Suicidal ideation: NO  Homicidal ideation: NO  Attention/Concentration:  intact  able to focus  Memory: grossly intact     Insight: Intact regarding alcohol use and its role in mood dysregulation  Judgment:Intact             Significant Labs:   Last 24 Hours:   Recent Lab Results     None        All pertinent labs within the past 24 hours have been reviewed.    Significant Imaging: I have reviewed all pertinent imaging results/findings within the past 24 hours.    Assessment/Plan:     * Substance induced mood disorder  - R/O MDD  - Patient with long history of binge drinking behavior  - recent binge drinking, appears triggered by recent stressors including breakup with girlfriend  - Patient with 5/9 SIGECAPSD x2 " weeks. Recent, persistent, and frequent SI reported.  - Patient amenable to inpatient psychiatric placement, consider FVA after admit       - Continued home meds at admit (seroquel 300 mg PO nightly, remeron 30 mg PO nightly, prazosin 2 mg PO nightly), patient reports good efficacy  - Increase prazosin to 3mg on 4/1  - Consider discontinuing remeron in favor of SSRI, but at this time will keep remeron on board given patient's recent weight loss w/ poor appetite and insomnia.           GERD (gastroesophageal reflux disease)  Patient with history of GERD.  Normally takes Zantac (not on formulary).  Will start famotidine 20mg BID.    Alcohol use disorder, severe, dependence  Long history of alcohol use with a dependence and withdrawal.  Affecting mental health.  Needs rehab and counseling resources to follow up as outpatient. Motivated for sobriety. Showing good insight    PTSD (post-traumatic stress disorder)  - symptoms after returning from  duty in [place] in 2001  - endorses symptoms of nightmares, night terrors, exaggerated startle, hypervigilance, depressed mood when intrusive thoughts occur  - reports prazosin 2 mg PO nightly has helped alleviate night symptoms in the past       - increase prazosin to 3mg PO nightly  - monitor BP (stable)    Alcohol withdrawal  - Patient with current symptoms anxiety, tremors, insomnia  - in ER received Ativan 2 mg PO @ 0032 and Valium 10 mg IM @ 0832  - mild tachy last night at 101 but most recent vitals improved (HR 94, /90)  - patient with concerning seizure-like activity reported Friday prior to admit, but denies hx known seizures otherwise  - EtOH 274 on arrival to ER (3/29/19 evening)    - received BZD as above in ER  - Valium taper initiated         Day 1 : valium 10 mg PO QID (got TID 3/30 as he received 10 mg IM in ER which will sub for first dose)         Day 2 : valium 10 mg PO TID on 3/31        Day 3 : valium 10 mg PO qAM, 5 at lunch, 10 qhs on 4/1         Day 4 : valium 10 mg PO BID on 4/2        Day 5 : valium 5 mg PO BID on 4/3        Day 6 : valium 5 mg PO qam on 4/4    - Ativan 2 mg PO q4h PRN for HR or DBP > 100  - Ativan 2 mg IM PRN for seizures         Need for Continued Hospitalization:   Psychiatric illness continues to pose a potential threat to life or bodily function, of self or others, thereby requiring the need for continued inpatient psychiatric hospitalization., Protective inpatient psychiatric hospitalization required while a safe disposition plan is enacted. and Requires ongoing hospitalization for stabilization of medications.    Anticipated Disposition: Psychiatric Hospital     Total time:  25 minutes with greater than 50% of this time spent in counseling and/or coordination of care.     Memo Trimble MD  [facility] Psychiat[facility]  PGY-4  4/1/2019 10:55 AM

## 2019-04-01 NOTE — ASSESSMENT & PLAN NOTE
- R/O MDD  - Patient with long history of binge drinking behavior  - recent binge drinking, appears triggered by recent stressors including breakup with girlfriend  - Patient with 5/9 SIGECAPSD x2 weeks. Recent, persistent, and frequent SI reported.  - Patient amenable to inpatient psychiatric placement, consider FVA after admit       - Continued home meds at admit (seroquel 300 mg PO nightly, remeron 30 mg PO nightly, prazosin 2 mg PO nightly), patient reports good efficacy  - Increase prazosin to 3mg on 4/1  - Consider discontinuing remeron in favor of SSRI, but at this time will keep remeron on board given patient's recent weight loss w/ poor appetite and insomnia.

## 2019-04-01 NOTE — ASSESSMENT & PLAN NOTE
- symptoms after returning from  duty in [place] in 2001  - endorses symptoms of nightmares, night terrors, exaggerated startle, hypervigilance, depressed mood when intrusive thoughts occur  - reports prazosin 2 mg PO nightly has helped alleviate night symptoms in the past       - increase prazosin to 3mg PO nightly  - monitor BP (stable)

## 2019-04-01 NOTE — PLAN OF CARE
Problem: Adult Behavioral Health Plan of Care  Goal: Plan of Care Review  Outcome: Ongoing (interventions implemented as appropriate)  Visible on unit during evening.  Watched TV with peers.  Compliant with scheduled medication, snack and activities.  No SI/HI/AVH, s/s withdrawal, or physical complaints reported or noted this evening.  Retired to bed at appropriate time.  Safety maintained.

## 2019-04-01 NOTE — PROGRESS NOTES
04/01/19 [address] [place] [address] Group Therapy   Group Name Therapeutic Recreation   Participation Level None   Participation Quality Refused;Withdrawn   Affect/Mood Display Blunted;Flat   Patient refused all activities

## 2019-04-01 NOTE — SUBJECTIVE & OBJECTIVE
Interval History: Patient seen in treatment team room. Calm and cooperative with interview. Patient able to openly talk about his struggles with alcohol that led to acute suicidality which prompted his hospitalization. Voiced frustration about not being able to get into detox/rehab placements prior to hospitalization, but still focused on rehab after stabilization. Denies history of prior withdrawal seizures, but does report a seizure Friday night prior to presentation to hospital. States his longest period of sobriety has been 1 year, and is motivated to obtain sobriety again with possible goal of living in an [facility]; reports reduction in cravings when he was on Vivitrol in the past. Lists stressors that have worsened mood recently as a fight with his girlfriend, increased alcohol use, and learning of HCV status. Denies any current SI/HI, intent, or plan. Continues to endorse PTSD symptoms of hypervigilance, flashbacks and nightmares, irritability, and depressed mood when having recurrent thoughts. Denies any AVH, but does report some tactile hallucinations last night. Stable appetite.     Family History     None        Tobacco Use    Smoking status: Current Every Day Smoker     Packs/day: 1.00     Years: 10.00     Pack years: 10.00    Smokeless tobacco: Never Used   Substance and Sexual Activity    Alcohol use: Not on file    Drug use: Not on file    Sexual activity: Not on file     Psychotherapeutics (From admission, onward)    Start     Stop Route Frequency Ordered    04/01/19 2100  diazePAM tablet 10 mg      04/03 0859 Oral 2 times daily 04/01/19 0944    04/01/19 1200  diazePAM tablet 5 mg      04/03 1159 Oral With lunch 04/01/19 0944    03/30/19 2100  QUEtiapine tablet 300 mg      -- Oral Nightly 03/30/19 1032    03/30/19 2100  mirtazapine tablet 30 mg      -- Oral Nightly 03/30/19 1032    03/30/19 1032  haloperidol tablet 5 mg  (Med - Acute  Behavioral Management)      -- Oral Every 4 hours PRN  "03/30/19 1032    03/30/19 1032  LORazepam tablet 2 mg  (Med - Acute  Behavioral Management)      -- Oral Every 4 hours PRN 03/30/19 1032    03/30/19 1032  haloperidol lactate injection 5 mg  (Med - Acute  Behavioral Management)      -- IM Every 4 hours PRN 03/30/19 1032    03/30/19 1032  lorazepam (ATIVAN) injection 2 mg  (Med - Acute  Behavioral Management)      -- IM Every 4 hours PRN 03/30/19 1032           Review of Systems   Constitutional: Positive for activity change. Negative for appetite change.   Respiratory: Negative for cough and shortness of breath.    Cardiovascular: Negative for chest pain.   Gastrointestinal: Negative for constipation, diarrhea, nausea and vomiting.   Musculoskeletal: Negative for myalgias.   Neurological: Positive for tremors. Negative for headaches.   Psychiatric/Behavioral: Positive for dysphoric mood, hallucinations (tactile) and sleep disturbance. Negative for suicidal ideas.     Objective:     Vital Signs (Most Recent):  Temp: 98.2 °F (36.8 °C) (03/31/19 1929)  Pulse: 94 (04/01/19 0743)  Resp: 18 (04/01/19 0743)  BP: (!) 134/90 (04/01/19 0743) Vital Signs (24h Range):  Temp:  [98.2 °F (36.8 °C)] 98.2 °F (36.8 °C)  Pulse:  [] 94  Resp:  [18] 18  BP: (110-137)/(74-95) 134/90     Height: 5' 7" (170.2 cm)  Weight: 89.3 kg (196 lb 13.9 oz)  Body mass index is 30.83 kg/m².    No intake or output data in the 24 hours ending 04/01/19 1024    Physical Exam   Psychiatric:   Mental Status Exam:  Arousal: alert  Sensorium/Orientation: grossly intact  Behavior/Cooperation: normal, cooperative, eye contact normal   Psychomotor: unremarkable and within normal limits  Speech: conversational rate, tone, and volume  Language: answered questions appropriately  Mood: "okay"   Affect: constricted  Thought Process: linear, logical  Thought Content:   Auditory hallucinations: NO  Visual hallucinations: NO  Paranoia: YES: 2/2 PTSD     Delusions:  NO  Suicidal ideation: NO  Homicidal ideation: " NO  Attention/Concentration:  intact  able to focus  Memory: grossly intact     Insight: Intact regarding alcohol use and its role in mood dysregulation  Judgment:Intact             Significant Labs:   Last 24 Hours:   Recent Lab Results     None        All pertinent labs within the past 24 hours have been reviewed.    Significant Imaging: I have reviewed all pertinent imaging results/findings within the past 24 hours.

## 2019-04-02 PROBLEM — F10.94 ALCOHOL-INDUCED MOOD DISORDER: Status: ACTIVE | Noted: 2019-03-30

## 2019-04-02 PROBLEM — B19.20 HEPATITIS C: Status: ACTIVE | Noted: 2019-04-02

## 2019-04-02 PROCEDURE — S4991 NICOTINE PATCH NONLEGEND: HCPCS | Performed by: PSYCHIATRY & NEUROLOGY

## 2019-04-02 PROCEDURE — 25000003 PHARM REV CODE 250: Performed by: PSYCHIATRY & NEUROLOGY

## 2019-04-02 PROCEDURE — 90833 PR PSYCHOTHERAPY W/PATIENT W/E&M, 30 MIN (ADD ON): ICD-10-PCS | Mod: ,,, | Performed by: PSYCHIATRY & NEUROLOGY

## 2019-04-02 PROCEDURE — 87522 HEPATITIS C REVRS TRNSCRPJ: CPT

## 2019-04-02 PROCEDURE — 36415 COLL VENOUS BLD VENIPUNCTURE: CPT

## 2019-04-02 PROCEDURE — 90853 PR GROUP PSYCHOTHERAPY: ICD-10-PCS | Mod: ,,, | Performed by: PSYCHOLOGIST

## 2019-04-02 PROCEDURE — 90853 GROUP PSYCHOTHERAPY: CPT | Mod: ,,, | Performed by: PSYCHOLOGIST

## 2019-04-02 PROCEDURE — 90833 PSYTX W PT W E/M 30 MIN: CPT | Mod: ,,, | Performed by: PSYCHIATRY & NEUROLOGY

## 2019-04-02 PROCEDURE — 12400001 HC PSYCH SEMI-PRIVATE ROOM

## 2019-04-02 PROCEDURE — 99232 PR SUBSEQUENT HOSPITAL CARE,LEVL II: ICD-10-PCS | Mod: ,,, | Performed by: PSYCHIATRY & NEUROLOGY

## 2019-04-02 PROCEDURE — 99232 SBSQ HOSP IP/OBS MODERATE 35: CPT | Mod: ,,, | Performed by: PSYCHIATRY & NEUROLOGY

## 2019-04-02 RX ORDER — METOCLOPRAMIDE 10 MG/1
10 TABLET ORAL EVERY 6 HOURS PRN
Status: DISCONTINUED | OUTPATIENT
Start: 2019-04-02 | End: 2019-04-05 | Stop reason: HOSPADM

## 2019-04-02 RX ORDER — LOPERAMIDE HYDROCHLORIDE 2 MG/1
2 CAPSULE ORAL 4 TIMES DAILY PRN
Status: DISCONTINUED | OUTPATIENT
Start: 2019-04-02 | End: 2019-04-05 | Stop reason: HOSPADM

## 2019-04-02 RX ORDER — DIAZEPAM 5 MG/1
5 TABLET ORAL 3 TIMES DAILY
Status: DISCONTINUED | OUTPATIENT
Start: 2019-04-02 | End: 2019-04-04

## 2019-04-02 RX ADMIN — NICOTINE 1 PATCH: 21 PATCH, EXTENDED RELEASE TRANSDERMAL at 08:04

## 2019-04-02 RX ADMIN — QUETIAPINE FUMARATE 300 MG: 300 TABLET ORAL at 08:04

## 2019-04-02 RX ADMIN — FAMOTIDINE 20 MG: 20 TABLET ORAL at 08:04

## 2019-04-02 RX ADMIN — Medication 100 MG: at 08:04

## 2019-04-02 RX ADMIN — PRAZOSIN HYDROCHLORIDE 3 MG: 1 CAPSULE ORAL at 08:04

## 2019-04-02 RX ADMIN — DIAZEPAM 5 MG: 5 TABLET ORAL at 08:04

## 2019-04-02 RX ADMIN — THERA TABS 1 TABLET: TAB at 08:04

## 2019-04-02 RX ADMIN — DIAZEPAM 5 MG: 5 TABLET ORAL at 02:04

## 2019-04-02 RX ADMIN — FOLIC ACID 1 MG: 1 TABLET ORAL at 08:04

## 2019-04-02 RX ADMIN — MIRTAZAPINE 30 MG: 30 TABLET, FILM COATED ORAL at 08:04

## 2019-04-02 RX ADMIN — DIAZEPAM 10 MG: 5 TABLET ORAL at 08:04

## 2019-04-02 NOTE — PLAN OF CARE
Problem: Adult Behavioral Health Plan of Care  Goal: Plan of Care Review  Outcome: Ongoing (interventions implemented as appropriate)  Observed awake and alert. Affect flat, mood calm and cooperative. Denied SI/HI, A/V hallucinations. No agitation or hostile behavior noted. No S/S of withdrawal noted. Took scheduled medications without any problems. Interacting appropriately with peers. Appeared asleep throughout the night as noted during frequent rounds. Free from falls/injury. Safety maintained. Continue to monitor.

## 2019-04-02 NOTE — ASSESSMENT & PLAN NOTE
- Patient with current symptoms anxiety, tremors, insomnia  - in ER received Ativan 2 mg PO @ 0032 and Valium 10 mg IM @ 0832  - mild tachy last night at 101 but most recent vitals improved (HR 94, /90)  - patient with concerning seizure-like activity reported Friday prior to admit, but denies hx known seizures otherwise  - EtOH 274 on arrival to ER (3/29/19 evening)    - received BZD as above in ER  - Valium taper initiated         Day 1 : valium 10 mg PO QID (got TID 3/30 as he received 10 mg IM in ER which will sub for first dose)         Day 2 : valium 10 mg PO TID on 3/31        Day 3 : valium 10 mg PO qAM, 5 at lunch, 10 qHS on 4/1        Day 4 : valium 10 mg PO qAM, 5 at lunch, 5 qHS on 4/2        Day 5 : valium 5 mg PO TID on 4/3        Day 6 : valium 5 mg PO BID on 4/4        Day 7 : valium 5 mg PO qAM on 4/5    - Ativan 2 mg PO q4h PRN for HR or DBP > 100  - Ativan 2 mg IM PRN for seizures  - Imodium and Reglan added for diarrhea and nausea

## 2019-04-02 NOTE — ASSESSMENT & PLAN NOTE
Patient with history of GERD.  Normally takes Zantac (not on formulary).      - Continue famotidine 20mg BID.

## 2019-04-02 NOTE — ASSESSMENT & PLAN NOTE
Long history of alcohol use with a dependence and withdrawal.  Affecting mental health.  Needs rehab and counseling resources to follow up as outpatient. Motivated for sobriety. Showing good insight.    Meeting with representative of [facility] today

## 2019-04-02 NOTE — PROGRESS NOTES
Pt gave pavel permission to call his girlfriend Maribeth  [phone]    Sw called Maribeth, left vm, will await call back.

## 2019-04-02 NOTE — PROGRESS NOTES
Kinga made contact with mom, Nancy Calderon  [phone].    Mom stated she spoke to patient and she supports and agrees with patient's decision to return to [place] and re establish care with [facility].   Sw asked mom if she was aware of patient's situation. Mom stated patient traveled to [place] to be with the women he met. Mom stated patient was admitted to [facility] because of his excess drinking.

## 2019-04-02 NOTE — SUBJECTIVE & OBJECTIVE
"Interval History: Patient seen with treatment team. Calm and cooperative with interview. Patient continues to openly talk about his struggles with alcohol that led to acute suicidality which prompted his hospitalization. Reports his biggest concern is getting into rehab after stabilization. Denies any current SI/HI, intent, or plan. Continues to endorse PTSD symptoms of hypervigilance, flashbacks and nightmares, irritability, and depressed mood when having recurrent thoughts; no nightmares overnight after increased prazosin dose. Denies any AVH, but does report some tactile hallucinations last night that were improved compared to previous night. Stable appetite. States he is feeling "a lot better" today,and his subjective feelings of withdrawal are improving. Reports somatic complaints of heartburn, diarrhea, and nausea.    Family History     None        Tobacco Use    Smoking status: Current Every Day Smoker     Packs/day: 1.00     Years: 10.00     Pack years: 10.00    Smokeless tobacco: Never Used   Substance and Sexual Activity    Alcohol use: Not on file    Drug use: Not on file    Sexual activity: Not on file     Psychotherapeutics (From admission, onward)    Start     Stop Route Frequency Ordered    04/02/19 1500  diazePAM tablet 5 mg      -- Oral 3 times daily 04/02/19 0943    04/01/19 1150  LORazepam tablet 2 mg      -- Oral Every 4 hours PRN 04/01/19 1051    04/01/19 1149  lorazepam (ATIVAN) injection 2 mg      -- IM Daily PRN 04/01/19 1051    03/30/19 2100  QUEtiapine tablet 300 mg      -- Oral Nightly 03/30/19 1032    03/30/19 2100  mirtazapine tablet 30 mg      -- Oral Nightly 03/30/19 1032    03/30/19 1032  haloperidol tablet 5 mg  (Med - Acute  Behavioral Management)      -- Oral Every 4 hours PRN 03/30/19 1032    03/30/19 1032  LORazepam tablet 2 mg  (Med - Acute  Behavioral Management)      -- Oral Every 4 hours PRN 03/30/19 1032    03/30/19 1032  haloperidol lactate injection 5 mg  (Med - Acute " " Behavioral Management)      -- IM Every 4 hours PRN 03/30/19 1032    03/30/19 1032  lorazepam (ATIVAN) injection 2 mg  (Med - Acute  Behavioral Management)      -- IM Every 4 hours PRN 03/30/19 1032           Review of Systems   Constitutional: Negative for appetite change.   Respiratory: Negative for cough and shortness of breath.    Cardiovascular: Negative for chest pain.   Gastrointestinal: Positive for diarrhea and nausea. Negative for constipation and vomiting.        Heartburn   Musculoskeletal: Negative for myalgias.   Neurological: Positive for tremors. Negative for headaches.   Psychiatric/Behavioral: Positive for dysphoric mood, hallucinations (tactile) and sleep disturbance. Negative for suicidal ideas.     Objective:     Vital Signs (Most Recent):  Temp: 97.4 °F (36.3 °C) (04/02/19 0736)  Pulse: 100 (04/02/19 0736)  Resp: 18 (04/02/19 0736)  BP: 121/72 (04/02/19 0736) Vital Signs (24h Range):  Temp:  [97.4 °F (36.3 °C)-98.8 °F (37.1 °C)] 97.4 °F (36.3 °C)  Pulse:  [] 100  Resp:  [18] 18  BP: (121-140)/(72-84) 121/72     Height: 5' 7" (170.2 cm)  Weight: 89.3 kg (196 lb 13.9 oz)  Body mass index is 30.83 kg/m².    No intake or output data in the 24 hours ending 04/02/19 0952    Physical Exam   Psychiatric:   Mental Status Exam:  Arousal: alert  Sensorium/Orientation: grossly intact  Behavior/Cooperation: normal, cooperative, eye contact normal   Psychomotor: unremarkable and within normal limits  Speech: conversational rate, tone, and volume  Language: answered questions appropriately  Mood: "a lot better"   Affect: constricted  Thought Process: linear, logical  Thought Content:   Auditory hallucinations: NO  Visual hallucinations: NO  Paranoia: YES: 2/2 PTSD     Delusions:  NO  Suicidal ideation: NO  Homicidal ideation: NO  Attention/Concentration:  intact  able to focus  Memory: grossly intact     Insight: Good regarding alcohol use and its role in mood dysregulation  Judgment:Intact         "     Significant Labs:   Last 24 Hours:   Recent Lab Results       04/01/19  1236        Hepatitis C Ab Positive         All pertinent labs within the past 24 hours have been reviewed.    Significant Imaging: I have reviewed all pertinent imaging results/findings within the past 24 hours.

## 2019-04-02 NOTE — ASSESSMENT & PLAN NOTE
- R/O MDD  - Patient with long history of binge drinking behavior  - recent binge drinking, appears triggered by recent stressors including breakup with girlfriend  - Patient with 5/9 SIGECAPSD x2 weeks. Recent, persistent, and frequent SI reported.  - Patient amenable to inpatient psychiatric placement, currently signed in as a FVA        - Continued home meds at admit (seroquel 300 mg PO nightly, remeron 30 mg PO nightly, prazosin 2 mg PO nightly), patient reports good efficacy  - Increase prazosin to 3mg on 4/1  - Consider discontinuing remeron in favor of SSRI, but at this time will keep remeron on board given patient's recent weight loss w/ poor appetite and insomnia.

## 2019-04-02 NOTE — PROGRESS NOTES
04/02/19 [address] [place] [address] Group Therapy   Group Name Therapeutic Recreation   Specific Interventions Skilled Activity Crafts   Participation Level Active;Appropriate   Participation Quality Cooperative   Insight/Motivation Limited   Affect/Mood Display Appropriate;Blunted   Cognition Alert

## 2019-04-02 NOTE — PROGRESS NOTES
04/01/19 2000   [facility] Group Therapy   Group Name Community Reintegration   Specific Interventions Current Events   Participation Level Appropriate   Participation Quality Cooperative   Insight/Motivation Good   Affect/Mood Display Appropriate   Cognition Alert
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
## 2019-04-02 NOTE — PROGRESS NOTES
Group Psychotherapy (PhD/LCSW)    Site: [facility]    Clinical status of patient: Inpatient    Date: 4/2/2019    Group Focus: Life Skills    Length of service: 66321 - 35-40 minutes    Number of patients in attendance: 6    Referred by: Acute Psychiatry Unit Treatment Team    Target symptoms: Alcohol Abuse, Anxiety and Mood Disorder    Patient's response to treatment: Active Listening and Self-disclosure    Progress toward goals: Progressing slowly    Interval History: Pt appeared alert and attentive in group. Pt participated actively and appropriately when prompted in a discussion of using 12-step principles to cope with stress. Pt discussed the way his life went much better when he was actively engaged in AA.     Diagnosis: Alcohol Induced Mood d/o; Alcohol Use d/o, severe dependence; PTSD     Plan: Continue treatment on [facility]

## 2019-04-02 NOTE — NURSING
Spoke with patient regarding rehab. Per , he is not eligible for  and will have to call [facility] to reestablish .  Pt stated he is frustrated with inability to get into a treatment program here in [place].    He spoke with his mother in [place] and has decided to go back to [place] and complete his treatment there. He would like to [facility]ablish [facility] and [facility] and complete his rehab program there. He stated his mother has spoken with them and he will be able to be admitted within the week.    He stated that his friend would be able to provide him transportation to the bus station and he can buy himself a ticket to [place]. He plans to stay with his mom after discharge until he can start rehab.    He signed a 72 hour release.    Mother is Nancy Burnette [phone].
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
## 2019-04-02 NOTE — ASSESSMENT & PLAN NOTE
- symptoms after returning from  duty in [place] in 2001  - endorses symptoms of nightmares, night terrors, exaggerated startle, hypervigilance, depressed mood when intrusive thoughts occur  - reports prazosin 2 mg PO nightly has helped alleviate night symptoms in the past       - continue prazosin 3mg PO nightly  - monitor BP (stable)

## 2019-04-02 NOTE — PROGRESS NOTES
Kinga made contact with patient's mom Nancy Calderon [phone].     Sw  Inquired about plans for patient to return to [place], and re establish care with [facility]. Mom stated she was unaware of patient's plan. Mom also stated the last conversation she had with patient was 3 days ago. Mom stated patient called her, and left several messages on her phone, but she was unaware of the nature of the calls.     Kinga asked mom if she wanted to discuss this matter with patient, and sw would call her back, mom agreed.

## 2019-04-02 NOTE — ASSESSMENT & PLAN NOTE
Patient reports previous diagnosis of HCV, but has not gotten treatment.    - HCV Ab positive  - Quantitative values pending

## 2019-04-03 PROCEDURE — 99232 PR SUBSEQUENT HOSPITAL CARE,LEVL II: ICD-10-PCS | Mod: ,,, | Performed by: PSYCHIATRY & NEUROLOGY

## 2019-04-03 PROCEDURE — 99232 SBSQ HOSP IP/OBS MODERATE 35: CPT | Mod: ,,, | Performed by: PSYCHIATRY & NEUROLOGY

## 2019-04-03 PROCEDURE — 25000003 PHARM REV CODE 250: Performed by: PSYCHIATRY & NEUROLOGY

## 2019-04-03 PROCEDURE — S4991 NICOTINE PATCH NONLEGEND: HCPCS | Performed by: PSYCHIATRY & NEUROLOGY

## 2019-04-03 PROCEDURE — 12400001 HC PSYCH SEMI-PRIVATE ROOM

## 2019-04-03 RX ORDER — PANTOPRAZOLE SODIUM 40 MG/1
40 TABLET, DELAYED RELEASE ORAL DAILY
Status: DISCONTINUED | OUTPATIENT
Start: 2019-04-03 | End: 2019-04-05 | Stop reason: HOSPADM

## 2019-04-03 RX ADMIN — NICOTINE 1 PATCH: 21 PATCH, EXTENDED RELEASE TRANSDERMAL at 08:04

## 2019-04-03 RX ADMIN — DIAZEPAM 5 MG: 5 TABLET ORAL at 08:04

## 2019-04-03 RX ADMIN — PANTOPRAZOLE SODIUM 40 MG: 40 TABLET, DELAYED RELEASE ORAL at 11:04

## 2019-04-03 RX ADMIN — FOLIC ACID 1 MG: 1 TABLET ORAL at 08:04

## 2019-04-03 RX ADMIN — Medication 100 MG: at 08:04

## 2019-04-03 RX ADMIN — DIAZEPAM 5 MG: 5 TABLET ORAL at 03:04

## 2019-04-03 RX ADMIN — QUETIAPINE FUMARATE 300 MG: 300 TABLET ORAL at 08:04

## 2019-04-03 RX ADMIN — PRAZOSIN HYDROCHLORIDE 3 MG: 1 CAPSULE ORAL at 08:04

## 2019-04-03 RX ADMIN — MIRTAZAPINE 30 MG: 30 TABLET, FILM COATED ORAL at 08:04

## 2019-04-03 RX ADMIN — THERA TABS 1 TABLET: TAB at 08:04

## 2019-04-03 RX ADMIN — FAMOTIDINE 20 MG: 20 TABLET ORAL at 08:04

## 2019-04-03 NOTE — ASSESSMENT & PLAN NOTE
Long history of alcohol use with a dependence and withdrawal.  Affecting mental health.  Needs rehab and counseling resources to follow up as outpatient. Motivated for sobriety. Showing good insight.    Plans to return to [place] and attend residential rehab after valium taper is completed

## 2019-04-03 NOTE — ASSESSMENT & PLAN NOTE
Patient with history of GERD.  Normally takes Zantac (not on formulary).      - Started on famotidine 20mg BID, but patient reports continued symptoms without relief.  - Started Protonix 40mg daily on 4/3
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
## 2019-04-03 NOTE — PLAN OF CARE
Pt isolative and withdrawn. Medication compliant. Denies SI/HI. Denies AH/VH. Irritable and frustrated upon approach. Did not attend groups. No physical compaints voiced. NAD observed. Will cont to monitor.

## 2019-04-03 NOTE — SUBJECTIVE & OBJECTIVE
"Interval History: Patient seen with treatment team. Calm and cooperative with interview. Patient continues to openly talk about his struggles with alcohol that led to acute suicidality which prompted his hospitalization. Maintains that his biggest concern is getting into rehab after stabilization. Denies any current SI/HI, intent, or plan. Continues to endorse PTSD symptoms of hypervigilance, flashbacks and nightmares, irritability, and depressed mood when having recurrent thoughts; no nightmares the last two nights. Denies any AVH, and now reports resolution of tactile hallucinations. Stable appetite. States he is feeling "a lot better" today,and his subjective feelings of withdrawal are improving. Reports only somatic complaints of heartburn and chronic back pain. Able to verbalize a plan to return to [place] and enter a residential rehab facility he has previously attended.    Family History     None        Tobacco Use    Smoking status: Current Every Day Smoker     Packs/day: 1.00     Years: 10.00     Pack years: 10.00    Smokeless tobacco: Never Used   Substance and Sexual Activity    Alcohol use: Not on file    Drug use: Not on file    Sexual activity: Not on file     Psychotherapeutics (From admission, onward)    Start     Stop Route Frequency Ordered    04/02/19 1500  diazePAM tablet 5 mg      -- Oral 3 times daily 04/02/19 0943    04/01/19 1150  LORazepam tablet 2 mg      -- Oral Every 4 hours PRN 04/01/19 1051    04/01/19 1149  lorazepam (ATIVAN) injection 2 mg      -- IM Daily PRN 04/01/19 1051    03/30/19 2100  QUEtiapine tablet 300 mg      -- Oral Nightly 03/30/19 1032    03/30/19 2100  mirtazapine tablet 30 mg      -- Oral Nightly 03/30/19 1032    03/30/19 1032  haloperidol tablet 5 mg  (Med - Acute  Behavioral Management)      -- Oral Every 4 hours PRN 03/30/19 1032    03/30/19 1032  LORazepam tablet 2 mg  (Med - Acute  Behavioral Management)      -- Oral Every 4 hours PRN 03/30/19 1032    " "03/30/19 1032  haloperidol lactate injection 5 mg  (Med - Acute  Behavioral Management)      -- IM Every 4 hours PRN 03/30/19 1032    03/30/19 1032  lorazepam (ATIVAN) injection 2 mg  (Med - Acute  Behavioral Management)      -- IM Every 4 hours PRN 03/30/19 1032           Review of Systems   Constitutional: Negative for appetite change.   Respiratory: Negative for cough and shortness of breath.    Cardiovascular: Negative for chest pain.   Gastrointestinal: Negative for constipation, diarrhea, nausea and vomiting.        Heartburn   Musculoskeletal: Positive for back pain (chronic). Negative for myalgias.   Neurological: Negative for tremors and headaches.   Psychiatric/Behavioral: Positive for dysphoric mood and sleep disturbance. Negative for hallucinations (tactile) and suicidal ideas.     Objective:     Vital Signs (Most Recent):  Temp: 97.4 °F (36.3 °C) (04/03/19 0735)  Pulse: 89 (04/03/19 0735)  Resp: 18 (04/03/19 0735)  BP: 131/86 (04/03/19 0735) Vital Signs (24h Range):  Temp:  [97.4 °F (36.3 °C)-98.3 °F (36.8 °C)] 97.4 °F (36.3 °C)  Pulse:  [71-99] 89  Resp:  [16-18] 18  BP: (118-131)/(68-86) 131/86     Height: 5' 7" (170.2 cm)  Weight: 89.3 kg (196 lb 13.9 oz)  Body mass index is 30.83 kg/m².    No intake or output data in the 24 hours ending 04/03/19 0911    Physical Exam   Psychiatric:   Mental Status Exam:  Arousal: alert  Sensorium/Orientation: grossly intact  Behavior/Cooperation: normal, cooperative, eye contact normal   Psychomotor: unremarkable and within normal limits  Speech: conversational rate, tone, and volume  Language: answered questions appropriately  Mood: "sleepy"   Affect: constricted  Thought Process: linear, logical  Thought Content:   Auditory hallucinations: NO  Visual hallucinations: NO  Paranoia: YES: 2/2 PTSD     Delusions:  NO  Suicidal ideation: NO  Homicidal ideation: NO  Attention/Concentration:  intact  able to focus  Memory: grossly intact     Insight: Good regarding " alcohol use and its role in mood dysregulation  Judgment:Intact           Significant Labs:   Last 24 Hours:   Recent Lab Results     None        All pertinent labs within the past 24 hours have been reviewed.    Significant Imaging: None

## 2019-04-03 NOTE — PROGRESS NOTES
04/03/19 1300   [facility] Group Therapy   Group Name Therapeutic Recreation   Participation Level Minimal;None   Participation Quality Withdrawn   Affect/Mood Display Irritable;Agitated   Patient presents with increased agitation when pressed to attend activities/groups

## 2019-04-03 NOTE — ASSESSMENT & PLAN NOTE
- Patient presented with symptoms of anxiety, tremors, insomnia  - in ER received Ativan 2 mg PO @ 0032 and Valium 10 mg IM @ 0832  - mild tachy at night with high of 100,  but most recent vitals improved (HR 89, /86)  - patient with concerning seizure-like activity reported Friday prior to admit, but denies hx known seizures otherwise  - EtOH 274 on arrival to ER (3/29/19 evening)    - received BZD as above in ER  - Valium taper initiated         Day 1 : valium 10 mg PO QID (got TID 3/30 as he received 10 mg IM in ER which will sub for first dose)         Day 2 : valium 10 mg PO TID on 3/31        Day 3 : valium 10 mg PO qAM, 5 at lunch, 10 qHS on 4/1        Day 4 : valium 10 mg PO qAM, 5 at lunch, 5 qHS on 4/2        Day 5 : valium 5 mg PO TID on 4/3        Day 6 : valium 5 mg PO BID on 4/4        Day 7 : valium 5 mg PO qAM on 4/5    - Ativan 2 mg PO q4h PRN for HR or DBP > 100  - Ativan 2 mg IM PRN for seizures  - PRN Imodium and Reglan added for diarrhea and nausea

## 2019-04-03 NOTE — ASSESSMENT & PLAN NOTE
- R/O MDD  - Patient with long history of binge drinking behavior  - recent binge drinking, appears triggered by recent stressors including breakup with girlfriend  - Patient with 5/9 SIGECAPSD x2 weeks. Recent, persistent, and frequent SI reported.  - Patient amenable to inpatient psychiatric placement, currently signed in as a FVA        - Continued home meds at admit (Seroquel 300 mg PO nightly, Remeron 30 mg PO nightly, prazosin 2 mg PO nightly), patient reports good efficacy  - Increased prazosin to 3mg on 4/1  - Consider discontinuing remeron in favor of SSRI, but at this time will keep remeron on board given patient's recent weight loss w/ poor appetite and insomnia.   - Patient signed 72-hour release from FVA status on 4/2. Agreeable to complete Valium taper prior to discharge.

## 2019-04-03 NOTE — PROGRESS NOTES
"[facility]  Psychiatry  Progress Note    Patient Name: David Madrid  MRN: [id number]   Code Status: Full Code  Admission Date: 3/30/2019  Hospital Length of Stay: 4 days  Expected Discharge Date:   Attending Physician: Pedrito Ellis MD  Primary Care Provider: Primary Doctor No    Current Legal Status: FVA    Patient information was obtained from patient, RNs, and ER records.     Subjective:     Principal Problem:Alcohol-induced mood disorder    Chief Complaint: alcohol use, depression, suicidal    HPI: Per ER RN:  "Pt presents to ED with suicidal ideation since returning from [place] in 2001. Pt states that he thinks about committing suicide daily and even dreams about killing himself. Pt states that he has severe PTSD for time in the  and that he wakes up several times a night due to nightmares. Pt states that he has a plan to kill himself, but he has never acted upon thoughts because he has a son. Pt states that he has been abusing ETOH for the past few yrs and drinks at least 1 pint of liquor daily and smokes about 1 pck of cigarettes daily as well. Pt states that today he woke up on the floor and was tremoring due to withdrawal from alcohol which led him to drink 1 pint of whiskey and a few beers. Pt states that today he tried to get into a facility to complete a 28 day detox, but was denied. Pt reports that he has gone through two detox programs in the past few yrs. Pt is prescribed Seroquel 300 mg for depression, but reports that he doesn't take it as prescribed because he doesn't like the way it makes him feel. Pt denies illicit drug use. Pt states that he does have family support system and that he no longer works due to being a disabled . Pt AAOx4 and ambulates independently with steady gait.  "     Per ER MD:  "48 y/o M with a medical history of depression, alcohol abuse and Hepatitis C presents with suicidal intention. 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 Patient states that he normally drinks 12 beers a day but today he wanted to kill himself by "drinking himself to death". He drank 1 pint of whiskey and 6 beers. States he hasn't eaten in the past 3 days. Patient is originally from [place] and moved down here after he met a woman online. He had a fight with his girlfriend last week, which caused him to spiral. Patient is on seroquel 300 mg, rameron 30 mg and zantec.  "     ========     Patient alert, sitting in chair on entry to room with sitter at bedside. Calm, appropriate, and linear throughout with constricted affect. No objective signs psychosis or mansi during interview. Mansi screen negative re past hx. No history AVH, no current. Patient reports he arrived to the ER for "suicidal thoughts and alcohol detox". Patient reports SI over the past two years with progressive increase in frequency of SI, daily for the past two weeks. Says SI worsened after recent breakup with girlfriend 2 weeks ago. Describes intermittent SI throughout his life, but notes significant worsening after returning from  duty in [place] in 2001. Says since then SI has been off and on, and reports PTSD symptoms since that time of nightmares, night terrors, flashbacks, and exaggerated startle response. Says experiences nightmares nightly, though later in interview when discussing prazosin reports relief of these nightmares when taken. Denies any previous suicide attempts but does report instances of trying to "drink [himself] to death". Denies current plan, reports he did have a plan formulated last year for [id number]. Patient reports depressed mood x2 weeks after break up with girlfriend. Endorses neurovegetative symptoms of poor sleep (trouble falling, staying, + PTSD symptoms), anhedonia (previously enjoyed fishing), guilt and hopelessness ("shame" over current situation, hopelessness to future with notable " "lack of future-orientation when asked further), poor appetite with 10 lb weight loss over two weeks, and recent SI - 5/9 SIGECAPSD x2 weeks.      Patient reports he drinks alcohol daily, endorses rare marijuana use and denies other illicits. Patient says he usually drinks 2-3 beers daily, but describes binge-pattern behavior. Says over the past two days he increased his daily drinking to "at least a 12 pack and a pint of Kalyan Fonseca". Last drink was yesterday ~5 PM tail-end of a binge similar to level just described. Says this is typical for when he starts to binge. Patient was in custodial last week for domestic battery charges after him and girlfriend got into an argument, reports this was his last detox - was without medication, patient says it took 4 days for symptoms to subside and cites tremors, night sweats, insomnia, and jitters. Patient last went to inpatient rehab last year for alcohol, says "I've been trying to kick this habit a long time". Describes his alcohol use as "waxing and waning" consistent with binge drinking. Patient denies hx of known seizures but alarmingly reports that yesterday he had an episode where he fell, was shaking and bit his tongue. Denies incontinence during the episode.     Patient reports he has been on seroquel 300 mg PO qhs, remeron 30 mg PO qhs, and prazosin 2 mg PO qhs for several years. First filled in [place] but patient now gets them filled by a Dr. Bee on the [facility].     Past Psychiatric History:  Previous Medication Trials: seroquel , remeron, prazosin  Previous Psychiatric Hospitalizations: once, about a year ago in [place] for SI w/ no attempt  Previous Suicide Attempts: denied, though reports attempts to drink himself to death  History of Violence: denied  Outpatient psychiatrist: Dr. Bee on the [facility]      Social History:  Marital Status: single (recent breakup)  Children: 4 adult children  Source of Income: VA benefits and [employer] work ()  Education: HS " maeve, associates degree [employer]  Special Ed: denied  Housing Status: living with a friend   History of phys/sexual abuse: denied  Access to gun: denied     Substance Use  Recreational Drugs: rare marijuana, denied others  Use of Alcohol: yes, heavy, see HPI  Tobacco Use: 1 ppd  Rehab History: 2x for alcohol, most recent last year inpatient  H/O Complicated Withdrawal: likely - denies known hx seizures but reports seizure like activity yesterday as noted in HPI      Legal History:  Past Charges/Incarcerations: yes , 6 1/2 years for drug charges  Pending charges: yes, domestic battery charges pending     Family Psychiatric History:  denied        Hospital Course: Admitted to inpatient unit on 3/30. Upon admit, Valium taper initiated due to concern for withdrawal seizures, thiamine and folic acid were started, as were prazosin 2mg qHS, Remeron 30mg qHS, and Seroquel 300mg qHS. Patient complained of GERD symptoms so Pepcid started on 3/31; Pepcid ineffective so switched to Protonix. Patient continued to report withdrawal symptoms as of 4/1, but Valium taper was continued; prazosin increased to 3mg on 4/1 for continued PTSD nightmares (as well as elevated BP and tachycardia). Patient with good insight into his alcohol use disorder, and continues to desire residential rehab. Unable to obtain placement at rehab facility in [facility]. Patient signed a 72-hour release on 4/2 as he desired to return to [place] and complete rehab at a facility there. Mother aware of patient's plans and is supportive. Vitals remained stable while tapering down Valium.    Interval History: Patient seen with treatment team. Calm and cooperative with interview. Patient continues to openly talk about his struggles with alcohol that led to acute suicidality which prompted his hospitalization. Maintains that his biggest concern is getting into rehab after stabilization. Denies any current SI/HI, intent, or plan. Continues to endorse PTSD symptoms  "of hypervigilance, flashbacks and nightmares, irritability, and depressed mood when having recurrent thoughts; no nightmares the last two nights. Denies any AVH, and now reports resolution of tactile hallucinations. Stable appetite. States he is feeling "a lot better" today,and his subjective feelings of withdrawal are improving. Reports only somatic complaints of heartburn and chronic back pain. Able to verbalize a plan to return to [place] and enter a residential rehab facility he has previously attended.    Family History     None        Tobacco Use    Smoking status: Current Every Day Smoker     Packs/day: 1.00     Years: 10.00     Pack years: 10.00    Smokeless tobacco: Never Used   Substance and Sexual Activity    Alcohol use: Not on file    Drug use: Not on file    Sexual activity: Not on file     Psychotherapeutics (From admission, onward)    Start     Stop Route Frequency Ordered    04/02/19 1500  diazePAM tablet 5 mg      -- Oral 3 times daily 04/02/19 0943    04/01/19 1150  LORazepam tablet 2 mg      -- Oral Every 4 hours PRN 04/01/19 1051    04/01/19 1149  lorazepam (ATIVAN) injection 2 mg      -- IM Daily PRN 04/01/19 1051    03/30/19 2100  QUEtiapine tablet 300 mg      -- Oral Nightly 03/30/19 1032    03/30/19 2100  mirtazapine tablet 30 mg      -- Oral Nightly 03/30/19 1032    03/30/19 1032  haloperidol tablet 5 mg  (Med - Acute  Behavioral Management)      -- Oral Every 4 hours PRN 03/30/19 1032    03/30/19 1032  LORazepam tablet 2 mg  (Med - Acute  Behavioral Management)      -- Oral Every 4 hours PRN 03/30/19 1032    03/30/19 1032  haloperidol lactate injection 5 mg  (Med - Acute  Behavioral Management)      -- IM Every 4 hours PRN 03/30/19 1032    03/30/19 1032  lorazepam (ATIVAN) injection 2 mg  (Med - Acute  Behavioral Management)      -- IM Every 4 hours PRN 03/30/19 1032           Review of Systems   Constitutional: Negative for appetite change.   Respiratory: Negative for cough and " "shortness of breath.    Cardiovascular: Negative for chest pain.   Gastrointestinal: Negative for constipation, diarrhea, nausea and vomiting.        Heartburn   Musculoskeletal: Positive for back pain (chronic). Negative for myalgias.   Neurological: Negative for tremors and headaches.   Psychiatric/Behavioral: Positive for dysphoric mood and sleep disturbance. Negative for hallucinations (tactile) and suicidal ideas.     Objective:     Vital Signs (Most Recent):  Temp: 97.4 °F (36.3 °C) (04/03/19 0735)  Pulse: 89 (04/03/19 0735)  Resp: 18 (04/03/19 0735)  BP: 131/86 (04/03/19 0735) Vital Signs (24h Range):  Temp:  [97.4 °F (36.3 °C)-98.3 °F (36.8 °C)] 97.4 °F (36.3 °C)  Pulse:  [71-99] 89  Resp:  [16-18] 18  BP: (118-131)/(68-86) 131/86     Height: 5' 7" (170.2 cm)  Weight: 89.3 kg (196 lb 13.9 oz)  Body mass index is 30.83 kg/m².    No intake or output data in the 24 hours ending 04/03/19 0911    Physical Exam   Psychiatric:   Mental Status Exam:  Arousal: alert  Sensorium/Orientation: grossly intact  Behavior/Cooperation: normal, cooperative, eye contact normal   Psychomotor: unremarkable and within normal limits  Speech: conversational rate, tone, and volume  Language: answered questions appropriately  Mood: "sleepy"   Affect: constricted  Thought Process: linear, logical  Thought Content:   Auditory hallucinations: NO  Visual hallucinations: NO  Paranoia: YES: 2/2 PTSD     Delusions:  NO  Suicidal ideation: NO  Homicidal ideation: NO  Attention/Concentration:  intact  able to focus  Memory: grossly intact     Insight: Good regarding alcohol use and its role in mood dysregulation  Judgment:Intact           Significant Labs:   Last 24 Hours:   Recent Lab Results     None        All pertinent labs within the past 24 hours have been reviewed.    Significant Imaging: None    Assessment/Plan:     * Alcohol-induced mood disorder  - R/O MDD  - Patient with long history of binge drinking behavior  - recent binge " drinking, appears triggered by recent stressors including breakup with girlfriend  - Patient with 5/9 SIGECAPSD x2 weeks. Recent, persistent, and frequent SI reported.  - Patient amenable to inpatient psychiatric placement, currently signed in as a FVA        - Continued home meds at admit (Seroquel 300 mg PO nightly, Remeron 30 mg PO nightly, prazosin 2 mg PO nightly), patient reports good efficacy  - Increased prazosin to 3mg on 4/1  - Consider discontinuing remeron in favor of SSRI, but at this time will keep remeron on board given patient's recent weight loss w/ poor appetite and insomnia.   - Patient signed 72-hour release from FVA status on 4/2. Agreeable to complete Valium taper prior to discharge.          Hepatitis C  Patient reports previous diagnosis of HCV, but has not gotten treatment.    - HCV Ab positive  - Quantitative values pending    GERD (gastroesophageal reflux disease)  Patient with history of GERD.  Normally takes Zantac (not on formulary).      - Started on famotidine 20mg BID, but patient reports continued symptoms without relief.  - Started Protonix 40mg daily on 4/3    Alcohol use disorder, severe, dependence  Long history of alcohol use with a dependence and withdrawal.  Affecting mental health.  Needs rehab and counseling resources to follow up as outpatient. Motivated for sobriety. Showing good insight.    Plans to return to [place] and attend residential rehab after valium taper is completed    PTSD (post-traumatic stress disorder)  - symptoms began after returning from  duty in [place] in 2001  - endorses symptoms of nightmares, night terrors, exaggerated startle, hypervigilance, depressed mood when intrusive thoughts occur  - reports prazosin 2 mg PO nightly has helped alleviate night symptoms in the past; nightmares continued when restarted at 2mg qHS       - continue prazosin 3mg PO nightly  - monitor BP (stable)    Alcohol withdrawal  - Patient presented with symptoms of  anxiety, tremors, insomnia  - in ER received Ativan 2 mg PO @ 0032 and Valium 10 mg IM @ 0832  - mild tachy at night with high of 100,  but most recent vitals improved (HR 89, /86)  - patient with concerning seizure-like activity reported Friday prior to admit, but denies hx known seizures otherwise  - EtOH 274 on arrival to ER (3/29/19 evening)    - received BZD as above in ER  - Valium taper initiated         Day 1 : valium 10 mg PO QID (got TID 3/30 as he received 10 mg IM in ER which will sub for first dose)         Day 2 : valium 10 mg PO TID on 3/31        Day 3 : valium 10 mg PO qAM, 5 at lunch, 10 qHS on 4/1        Day 4 : valium 10 mg PO qAM, 5 at lunch, 5 qHS on 4/2        Day 5 : valium 5 mg PO TID on 4/3        Day 6 : valium 5 mg PO BID on 4/4        Day 7 : valium 5 mg PO qAM on 4/5    - Ativan 2 mg PO q4h PRN for HR or DBP > 100  - Ativan 2 mg IM PRN for seizures  - PRN Imodium and Reglan added for diarrhea and nausea       Need for Continued Hospitalization:   Protective inpatient psychiatric hospitalization required while a safe disposition plan is enacted. and Requires ongoing hospitalization for stabilization of medications.    Anticipated Disposition: Rehab Facility in [place]    Total time:  15 minutes with greater than 50% of this time spent in counseling and/or coordination of care.     Memo Trimble MD  [facility]  PGY-4  4/3/2019 9:28 AM

## 2019-04-03 NOTE — PLAN OF CARE
Problem: Adult Behavioral Health Plan of Care  Goal: Plan of Care Review  Outcome: Ongoing (interventions implemented as appropriate)  The patient was out in the milieu during evening hours. He was attired in personal casual wear w/ fair grooming & hygiene. He expressed that he was frustrated as he was unable to receive rehabilitation services in the [facility] & will have to return to his hometown  In [place]. He denied S/HI, A/VH. He was provided a supportive milieu. He was encouraged to maintain medication compliance to prevent relapse &/or frequent hospitalizations.

## 2019-04-03 NOTE — PLAN OF CARE
04/02/19 2000   [facility] Group Therapy   Group Name Community Reintegration   Specific Interventions Coping Skills Training   Participation Level Active   Participation Quality Cooperative   Insight/Motivation Improved   Affect/Mood Display Appropriate   Cognition Alert

## 2019-04-03 NOTE — ASSESSMENT & PLAN NOTE
- symptoms began after returning from  duty in [place] in 2001  - endorses symptoms of nightmares, night terrors, exaggerated startle, hypervigilance, depressed mood when intrusive thoughts occur  - reports prazosin 2 mg PO nightly has helped alleviate night symptoms in the past; nightmares continued when restarted at 2mg qHS       - continue prazosin 3mg PO nightly  - monitor BP (stable)

## 2019-04-04 LAB — VIT B1 BLD-MCNC: 58 UG/L (ref 38–122)

## 2019-04-04 PROCEDURE — 90833 PSYTX W PT W E/M 30 MIN: CPT | Mod: ,,, | Performed by: PSYCHIATRY & NEUROLOGY

## 2019-04-04 PROCEDURE — 12400001 HC PSYCH SEMI-PRIVATE ROOM

## 2019-04-04 PROCEDURE — 90833 PR PSYCHOTHERAPY W/PATIENT W/E&M, 30 MIN (ADD ON): ICD-10-PCS | Mod: ,,, | Performed by: PSYCHIATRY & NEUROLOGY

## 2019-04-04 PROCEDURE — 99232 PR SUBSEQUENT HOSPITAL CARE,LEVL II: ICD-10-PCS | Mod: ,,, | Performed by: PSYCHIATRY & NEUROLOGY

## 2019-04-04 PROCEDURE — S4991 NICOTINE PATCH NONLEGEND: HCPCS | Performed by: PSYCHIATRY & NEUROLOGY

## 2019-04-04 PROCEDURE — 25000003 PHARM REV CODE 250: Performed by: PSYCHIATRY & NEUROLOGY

## 2019-04-04 PROCEDURE — 99232 SBSQ HOSP IP/OBS MODERATE 35: CPT | Mod: ,,, | Performed by: PSYCHIATRY & NEUROLOGY

## 2019-04-04 RX ORDER — QUETIAPINE FUMARATE 300 MG/1
300 TABLET, FILM COATED ORAL NIGHTLY
Qty: 30 TABLET | Refills: 0 | Status: SHIPPED | OUTPATIENT
Start: 2019-04-04

## 2019-04-04 RX ORDER — DIAZEPAM 5 MG/1
5 TABLET ORAL 2 TIMES DAILY
Status: DISCONTINUED | OUTPATIENT
Start: 2019-04-04 | End: 2019-04-04

## 2019-04-04 RX ORDER — PANTOPRAZOLE SODIUM 40 MG/1
40 TABLET, DELAYED RELEASE ORAL DAILY
Qty: 30 TABLET | Refills: 0 | Status: SHIPPED | OUTPATIENT
Start: 2019-04-05 | End: 2019-05-05

## 2019-04-04 RX ORDER — PRAZOSIN HYDROCHLORIDE 1 MG/1
3 CAPSULE ORAL NIGHTLY
Qty: 90 CAPSULE | Refills: 0 | Status: SHIPPED | OUTPATIENT
Start: 2019-04-04 | End: 2019-05-04

## 2019-04-04 RX ORDER — MIRTAZAPINE 30 MG/1
30 TABLET, FILM COATED ORAL NIGHTLY
Qty: 30 TABLET | Refills: 0 | Status: SHIPPED | OUTPATIENT
Start: 2019-04-04 | End: 2019-05-04

## 2019-04-04 RX ORDER — DIAZEPAM 5 MG/1
5 TABLET ORAL 2 TIMES DAILY
Status: COMPLETED | OUTPATIENT
Start: 2019-04-04 | End: 2019-04-05

## 2019-04-04 RX ADMIN — Medication 100 MG: at 08:04

## 2019-04-04 RX ADMIN — PANTOPRAZOLE SODIUM 40 MG: 40 TABLET, DELAYED RELEASE ORAL at 08:04

## 2019-04-04 RX ADMIN — PRAZOSIN HYDROCHLORIDE 3 MG: 1 CAPSULE ORAL at 08:04

## 2019-04-04 RX ADMIN — MIRTAZAPINE 30 MG: 30 TABLET, FILM COATED ORAL at 08:04

## 2019-04-04 RX ADMIN — DIAZEPAM 5 MG: 5 TABLET ORAL at 08:04

## 2019-04-04 RX ADMIN — QUETIAPINE FUMARATE 300 MG: 300 TABLET ORAL at 08:04

## 2019-04-04 RX ADMIN — NICOTINE 1 PATCH: 21 PATCH, EXTENDED RELEASE TRANSDERMAL at 08:04

## 2019-04-04 RX ADMIN — THERA TABS 1 TABLET: TAB at 08:04

## 2019-04-04 RX ADMIN — FOLIC ACID 1 MG: 1 TABLET ORAL at 08:04

## 2019-04-04 NOTE — PLAN OF CARE
Problem: Adult Behavioral Health Plan of Care  Goal: Plan of Care Review  Outcome: Ongoing (interventions implemented as appropriate)  Patient irritable and isolative early morning. Denies SI/HI/AVH. Med compliant. Vital signs stable. More pleasant and in better after lunch. Denies any physical complaints. Made several phone calls and looking forward to be discharge tomorrow.

## 2019-04-04 NOTE — PROGRESS NOTES
04/04/19 1430   [facility] Group Therapy   Group Name Stress Management   Specific Interventions Remotivation   Participation Level Active;Supportive;Appropriate   Participation Quality Cooperative   Insight/Motivation Good   Affect/Mood Display Appropriate   Cognition Alert

## 2019-04-04 NOTE — ASSESSMENT & PLAN NOTE
Patient with history of GERD.  Normally takes Zantac (not on formulary).      - Started on famotidine 20mg BID, but patient reports continued symptoms without relief, so discontinued.  - Started Protonix 40mg daily on 4/3

## 2019-04-04 NOTE — PROGRESS NOTES
04/03/19 2000   [facility] Group Therapy   Group Name Community Reintegration   Specific Interventions Current Events   Participation Level Appropriate   Participation Quality Cooperative   Insight/Motivation Good   Affect/Mood Display Appropriate   Cognition Alert

## 2019-04-04 NOTE — PROGRESS NOTES
04/04/19 0900 04/04/19 1000 04/04/19 1100   [facility] Group Therapy   Group Name Community Reintegration Education Education   Specific Interventions Current Events Relapse Prevention Skilled Activity Crafts   Participation Level  --  Active;Appropriate Active;Appropriate   Participation Quality Refused Cooperative Cooperative;Social   Insight/Motivation  --  Good Good   Affect/Mood Display Irritable Appropriate Appropriate   Cognition  --  Alert Alert      04/04/19 1300   [facility] Group Therapy   Group Name Therapeutic Recreation   Specific Interventions Skilled Activity Crafts   Participation Level Active   Participation Quality Cooperative;Social   Insight/Motivation Good   Affect/Mood Display Appropriate   Cognition Alert

## 2019-04-04 NOTE — NURSING
The patient is recv'd out in the milieu. He is attired in casual wear w/ an unkempt appearance. His affect is blunted, mood is hopeful. He states that he is looking forward to returning to [place] for rehab treatment.  He is free of any tremors, skin is warm & dry to touch.  He denies S/HI, A/VH. He is maintained on MVC, fall precaution. Supportive milieu maintained.

## 2019-04-04 NOTE — PLAN OF CARE
04/03/19 1530   [facility] Group Therapy   Group Name Medication   Participation Level None   Participation Quality Refused

## 2019-04-04 NOTE — SUBJECTIVE & OBJECTIVE
"Interval History: Patient seen with treatment team. Calm and cooperative with interview. Patient states his mood is "okay" this morning. Sleep continues to be "up and down," and still has occasional nightmares. Appetite is stable. Feels withdrawals are improving. Ready for discharge tomorrow. Has a friend who will pick him up and help make travel plans. States he did get some GERD relief with addition of Protonix. Will need paper prescriptions at discharge, but is not interested in nicotine replacement. Is interested into going to a AA meeting tomorrow night.    Family History     None        Tobacco Use    Smoking status: Current Every Day Smoker     Packs/day: 1.00     Years: 10.00     Pack years: 10.00    Smokeless tobacco: Never Used   Substance and Sexual Activity    Alcohol use: Not on file    Drug use: Not on file    Sexual activity: Not on file     Psychotherapeutics (From admission, onward)    Start     Stop Route Frequency Ordered    04/04/19 0900  diazePAM tablet 5 mg      -- Oral 2 times daily 04/04/19 0812    04/01/19 1150  LORazepam tablet 2 mg      -- Oral Every 4 hours PRN 04/01/19 1051    04/01/19 1149  lorazepam (ATIVAN) injection 2 mg      -- IM Daily PRN 04/01/19 1051    03/30/19 2100  QUEtiapine tablet 300 mg      -- Oral Nightly 03/30/19 1032    03/30/19 2100  mirtazapine tablet 30 mg      -- Oral Nightly 03/30/19 1032    03/30/19 1032  haloperidol tablet 5 mg  (Med - Acute  Behavioral Management)      -- Oral Every 4 hours PRN 03/30/19 1032    03/30/19 1032  LORazepam tablet 2 mg  (Med - Acute  Behavioral Management)      -- Oral Every 4 hours PRN 03/30/19 1032    03/30/19 1032  haloperidol lactate injection 5 mg  (Med - Acute  Behavioral Management)      -- IM Every 4 hours PRN 03/30/19 1032    03/30/19 1032  lorazepam (ATIVAN) injection 2 mg  (Med - Acute  Behavioral Management)      -- IM Every 4 hours PRN 03/30/19 1032           Review of Systems   Constitutional: Negative for " "appetite change.   Respiratory: Negative for cough and shortness of breath.    Cardiovascular: Negative for chest pain.   Gastrointestinal: Negative for constipation, diarrhea, nausea and vomiting.        Heartburn   Musculoskeletal: Positive for back pain (chronic). Negative for myalgias.   Neurological: Negative for tremors and headaches.   Psychiatric/Behavioral: Positive for sleep disturbance. Negative for behavioral problems, hallucinations (tactile) and suicidal ideas.     Objective:     Vital Signs (Most Recent):  Temp: 97.9 °F (36.6 °C) (04/04/19 0720)  Pulse: 98 (04/04/19 0720)  Resp: 18 (04/04/19 0720)  BP: (!) 103/56 (04/04/19 0720) Vital Signs (24h Range):  Temp:  [97.5 °F (36.4 °C)-98.4 °F (36.9 °C)] 97.9 °F (36.6 °C)  Pulse:  [] 98  Resp:  [18] 18  BP: (103-133)/(56-94) 103/56     Height: 5' 7" (170.2 cm)  Weight: 89.3 kg (196 lb 13.9 oz)  Body mass index is 30.83 kg/m².    No intake or output data in the 24 hours ending 04/04/19 0956    Physical Exam   Psychiatric:   Mental Status Exam:  Arousal: alert  Sensorium/Orientation: grossly intact  Behavior/Cooperation: normal, cooperative, eye contact normal   Psychomotor: unremarkable and within normal limits  Speech: conversational rate, tone, and volume  Language: answered questions appropriately  Mood: "alright"   Affect: appropriate and reactive  Thought Process: linear, logical  Thought Content:   Auditory hallucinations: NO  Visual hallucinations: NO  Paranoia: YES: 2/2 PTSD     Delusions:  NO  Suicidal ideation: NO  Homicidal ideation: NO  Attention/Concentration:  intact  able to focus  Memory: grossly intact     Insight: Good regarding alcohol use and its role in mood dysregulation  Judgment:Intact           Significant Labs:   Last 24 Hours:   Recent Lab Results     None        All pertinent labs within the past 24 hours have been reviewed.    Significant Imaging: None  "

## 2019-04-04 NOTE — PROGRESS NOTES
"[facility]  Psychiatry  Progress Note    Patient Name: David Madrid  MRN: [id number]   Code Status: Full Code  Admission Date: 3/30/2019  Hospital Length of Stay: 5 days  Expected Discharge Date:   Attending Physician: Pedrito Ellis MD  Primary Care Provider: Primary Doctor No    Current Legal Status: FVA    Patient information was obtained from patient and ER records.     Subjective:     Principal Problem:Alcohol-induced mood disorder    Chief Complaint: alcohol use, depression, suicidal    HPI: Per ER RN:  "Pt presents to ED with suicidal ideation since returning from [place] in 2001. Pt states that he thinks about committing suicide daily and even dreams about killing himself. Pt states that he has severe PTSD for time in the  and that he wakes up several times a night due to nightmares. Pt states that he has a plan to kill himself, but he has never acted upon thoughts because he has a son. Pt states that he has been abusing ETOH for the past few yrs and drinks at least 1 pint of liquor daily and smokes about 1 pck of cigarettes daily as well. Pt states that today he woke up on the floor and was tremoring due to withdrawal from alcohol which led him to drink 1 pint of whiskey and a few beers. Pt states that today he tried to get into a facility to complete a 28 day detox, but was denied. Pt reports that he has gone through two detox programs in the past few yrs. Pt is prescribed Seroquel 300 mg for depression, but reports that he doesn't take it as prescribed because he doesn't like the way it makes him feel. Pt denies illicit drug use. Pt states that he does have family support system and that he no longer works due to being a disabled . Pt AAOx4 and ambulates independently with steady gait.  "     Per ER MD:  "48 y/o M with a medical history of depression, alcohol abuse and Hepatitis C presents with suicidal intention. 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 Patient states that he normally drinks 12 beers a day but today he wanted to kill himself by "drinking himself to death". He drank 1 pint of whiskey and 6 beers. States he hasn't eaten in the past 3 days. Patient is originally from [place] and moved down here after he met a woman online. He had a fight with his girlfriend last week, which caused him to spiral. Patient is on seroquel 300 mg, rameron 30 mg and zantec.  "     ========     Patient alert, sitting in chair on entry to room with sitter at bedside. Calm, appropriate, and linear throughout with constricted affect. No objective signs psychosis or mansi during interview. Mansi screen negative re past hx. No history AVH, no current. Patient reports he arrived to the ER for "suicidal thoughts and alcohol detox". Patient reports SI over the past two years with progressive increase in frequency of SI, daily for the past two weeks. Says SI worsened after recent breakup with girlfriend 2 weeks ago. Describes intermittent SI throughout his life, but notes significant worsening after returning from  duty in [place] in 2001. Says since then SI has been off and on, and reports PTSD symptoms since that time of nightmares, night terrors, flashbacks, and exaggerated startle response. Says experiences nightmares nightly, though later in interview when discussing prazosin reports relief of these nightmares when taken. Denies any previous suicide attempts but does report instances of trying to "drink [himself] to death". Denies current plan, reports he did have a plan formulated last year for [id number]. Patient reports depressed mood x2 weeks after break up with girlfriend. Endorses neurovegetative symptoms of poor sleep (trouble falling, staying, + PTSD symptoms), anhedonia (previously enjoyed fishing), guilt and hopelessness ("shame" over current situation, hopelessness to future with notable lack of " "future-orientation when asked further), poor appetite with 10 lb weight loss over two weeks, and recent SI - 5/9 SIGECAPSD x2 weeks.      Patient reports he drinks alcohol daily, endorses rare marijuana use and denies other illicits. Patient says he usually drinks 2-3 beers daily, but describes binge-pattern behavior. Says over the past two days he increased his daily drinking to "at least a 12 pack and a pint of Kalyan Fonseca". Last drink was yesterday ~5 PM tail-end of a binge similar to level just described. Says this is typical for when he starts to binge. Patient was in residential last week for domestic battery charges after him and girlfriend got into an argument, reports this was his last detox - was without medication, patient says it took 4 days for symptoms to subside and cites tremors, night sweats, insomnia, and jitters. Patient last went to inpatient rehab last year for alcohol, says "I've been trying to kick this habit a long time". Describes his alcohol use as "waxing and waning" consistent with binge drinking. Patient denies hx of known seizures but alarmingly reports that yesterday he had an episode where he fell, was shaking and bit his tongue. Denies incontinence during the episode.     Patient reports he has been on seroquel 300 mg PO qhs, remeron 30 mg PO qhs, and prazosin 2 mg PO qhs for several years. First filled in [place] but patient now gets them filled by a Dr. Bee on the [facility].     Past Psychiatric History:  Previous Medication Trials: seroquel , remeron, prazosin  Previous Psychiatric Hospitalizations: once, about a year ago in [place] for SI w/ no attempt  Previous Suicide Attempts: denied, though reports attempts to drink himself to death  History of Violence: denied  Outpatient psychiatrist: Dr. Bee on the [facility]      Social History:  Marital Status: single (recent breakup)  Children: 4 adult children  Source of Income: VA benefits and [employer] work ()  Education: HS grad, " "associates degree [employer]  Special Ed: denied  Housing Status: living with a friend   History of phys/sexual abuse: denied  Access to gun: denied     Substance Use  Recreational Drugs: rare marijuana, denied others  Use of Alcohol: yes, heavy, see HPI  Tobacco Use: 1 ppd  Rehab History: 2x for alcohol, most recent last year inpatient  H/O Complicated Withdrawal: likely - denies known hx seizures but reports seizure like activity yesterday as noted in HPI      Legal History:  Past Charges/Incarcerations: yes , 6 1/2 years for drug charges  Pending charges: yes, domestic battery charges pending     Family Psychiatric History:  denied        Hospital Course: Admitted to inpatient unit on 3/30. Upon admit, Valium taper initiated due to concern for withdrawal seizures, thiamine and folic acid were started, as were prazosin 2mg qHS, Remeron 30mg qHS, and Seroquel 300mg qHS. Patient complained of GERD symptoms so Pepcid started on 3/31; Pepcid ineffective so switched to Protonix. Patient continued to report withdrawal symptoms as of 4/1, but Valium taper was continued; prazosin increased to 3mg on 4/1 for continued PTSD nightmares (as well as elevated BP and tachycardia). Patient with good insight into his alcohol use disorder, and continues to desire residential rehab. Unable to obtain placement at rehab facility in [facility]. Patient signed a 72-hour release on 4/2 as he desired to return to [place] and complete rehab at a facility there. Mother aware of patient's plans and is supportive. Vitals remained stable while tapering down Valium. Completing Valium taper on 4/5.    Interval History: Patient seen with treatment team. Calm and cooperative with interview. Patient states his mood is "okay" this morning. Sleep continues to be "up and down," and still has occasional nightmares. Appetite is stable. Feels withdrawals are improving. Ready for discharge tomorrow. Has a friend who will pick him up and help make travel " plans. States he did get some GERD relief with addition of Protonix. Will need paper prescriptions at discharge, but is not interested in nicotine replacement. Is interested into going to a AA meeting tomorrow night.    Family History     None        Tobacco Use    Smoking status: Current Every Day Smoker     Packs/day: 1.00     Years: 10.00     Pack years: 10.00    Smokeless tobacco: Never Used   Substance and Sexual Activity    Alcohol use: Not on file    Drug use: Not on file    Sexual activity: Not on file     Psychotherapeutics (From admission, onward)    Start     Stop Route Frequency Ordered    04/04/19 0900  diazePAM tablet 5 mg      -- Oral 2 times daily 04/04/19 0812    04/01/19 1150  LORazepam tablet 2 mg      -- Oral Every 4 hours PRN 04/01/19 1051    04/01/19 1149  lorazepam (ATIVAN) injection 2 mg      -- IM Daily PRN 04/01/19 1051    03/30/19 2100  QUEtiapine tablet 300 mg      -- Oral Nightly 03/30/19 1032    03/30/19 2100  mirtazapine tablet 30 mg      -- Oral Nightly 03/30/19 1032    03/30/19 1032  haloperidol tablet 5 mg  (Med - Acute  Behavioral Management)      -- Oral Every 4 hours PRN 03/30/19 1032    03/30/19 1032  LORazepam tablet 2 mg  (Med - Acute  Behavioral Management)      -- Oral Every 4 hours PRN 03/30/19 1032    03/30/19 1032  haloperidol lactate injection 5 mg  (Med - Acute  Behavioral Management)      -- IM Every 4 hours PRN 03/30/19 1032    03/30/19 1032  lorazepam (ATIVAN) injection 2 mg  (Med - Acute  Behavioral Management)      -- IM Every 4 hours PRN 03/30/19 1032           Review of Systems   Constitutional: Negative for appetite change.   Respiratory: Negative for cough and shortness of breath.    Cardiovascular: Negative for chest pain.   Gastrointestinal: Negative for constipation, diarrhea, nausea and vomiting.        Heartburn   Musculoskeletal: Positive for back pain (chronic). Negative for myalgias.   Neurological: Negative for tremors and headaches.  "  Psychiatric/Behavioral: Positive for sleep disturbance. Negative for behavioral problems, hallucinations (tactile) and suicidal ideas.     Objective:     Vital Signs (Most Recent):  Temp: 97.9 °F (36.6 °C) (04/04/19 0720)  Pulse: 98 (04/04/19 0720)  Resp: 18 (04/04/19 0720)  BP: (!) 103/56 (04/04/19 0720) Vital Signs (24h Range):  Temp:  [97.5 °F (36.4 °C)-98.4 °F (36.9 °C)] 97.9 °F (36.6 °C)  Pulse:  [] 98  Resp:  [18] 18  BP: (103-133)/(56-94) 103/56     Height: 5' 7" (170.2 cm)  Weight: 89.3 kg (196 lb 13.9 oz)  Body mass index is 30.83 kg/m².    No intake or output data in the 24 hours ending 04/04/19 0956    Physical Exam   Psychiatric:   Mental Status Exam:  Arousal: alert  Sensorium/Orientation: grossly intact  Behavior/Cooperation: normal, cooperative, eye contact normal   Psychomotor: unremarkable and within normal limits  Speech: conversational rate, tone, and volume  Language: answered questions appropriately  Mood: "alright"   Affect: appropriate and reactive  Thought Process: linear, logical  Thought Content:   Auditory hallucinations: NO  Visual hallucinations: NO  Paranoia: YES: 2/2 PTSD     Delusions:  NO  Suicidal ideation: NO  Homicidal ideation: NO  Attention/Concentration:  intact  able to focus  Memory: grossly intact     Insight: Good regarding alcohol use and its role in mood dysregulation  Judgment:Intact           Significant Labs:   Last 24 Hours:   Recent Lab Results     None        All pertinent labs within the past 24 hours have been reviewed.    Significant Imaging: None    Assessment/Plan:     * Alcohol-induced mood disorder  - R/O MDD  - Patient with long history of binge drinking behavior  - recent binge drinking, appears triggered by recent stressors including breakup with girlfriend  - Patient with 5/9 SIGECAPSD x2 weeks. Recent, persistent, and frequent SI reported.  - Patient amenable to inpatient psychiatric placement, currently signed in as a FVA        - Continued home " meds at admit (Seroquel 300 mg PO nightly, Remeron 30 mg PO nightly, prazosin 2 mg PO nightly), patient reports good efficacy  - Increased prazosin to 3mg on 4/1  - Consider discontinuing remeron in favor of SSRI, but at this time will keep remeron on board given patient's recent weight loss w/ poor appetite and insomnia.   - Patient signed 72-hour release from FVA status on 4/2. Agreeable to complete Valium taper prior to discharge.    Hepatitis C  Patient reports previous diagnosis of HCV, but has not gotten treatment.    - HCV Ab positive  - Quantitative values pending    GERD (gastroesophageal reflux disease)  Patient with history of GERD.  Normally takes Zantac (not on formulary).      - Started on famotidine 20mg BID, but patient reports continued symptoms without relief, so discontinued.  - Started Protonix 40mg daily on 4/3    Alcohol use disorder, severe, dependence  Long history of alcohol use with a dependence and withdrawal.  Affecting mental health.  Needs rehab and counseling resources to follow up as outpatient. Motivated for sobriety. Showing good insight.    Plans to return to [place] and attend residential rehab after valium taper is completed    PTSD (post-traumatic stress disorder)  - symptoms began after returning from  duty in [place] in 2001  - endorses symptoms of nightmares, night terrors, exaggerated startle, hypervigilance, depressed mood when intrusive thoughts occur  - reports prazosin 2 mg PO nightly has helped alleviate night symptoms in the past; nightmares continued when restarted at 2mg qHS       - continue prazosin 3mg PO nightly  - monitor BP (stable)    Alcohol withdrawal  - Patient presented with symptoms of anxiety, tremors, insomnia  - in ER received Ativan 2 mg PO @ 0032 and Valium 10 mg IM @ 0832  - mild tachy at night with high of 100,  but most recent vitals improved (HR 89, /86)  - patient with concerning seizure-like activity reported Friday prior to admit,  but denies hx known seizures otherwise  - EtOH 274 on arrival to ER (3/29/19 evening)    - received BZD as above in ER  - Valium taper initiated         Day 1 : valium 10 mg PO QID (got TID 3/30 as he received 10 mg IM in ER which will sub for first dose)         Day 2 : valium 10 mg PO TID on 3/31        Day 3 : valium 10 mg PO qAM, 5 at lunch, 10 qHS on 4/1        Day 4 : valium 10 mg PO qAM, 5 at lunch, 5 qHS on 4/2        Day 5 : valium 5 mg PO TID on 4/3        Day 6 : valium 5 mg PO BID on 4/4        Day 7 : valium 5 mg PO qAM on 4/5    - Ativan 2 mg PO q4h PRN for HR or DBP > 100  - Ativan 2 mg IM PRN for seizures  - PRN Imodium and Reglan added for diarrhea and nausea         Need for Continued Hospitalization:   Protective inpatient psychiatric hospitalization required while a safe disposition plan is enacted. and Requires ongoing hospitalization for stabilization of medications.    Anticipated Disposition: Home or Self Care with f/u at rehab facility in [place]    Total time:  15 minutes with greater than 50% of this time spent in counseling and/or coordination of care.     Memo Trimble MD  [facility]  PGY-4  4/4/2019 10:03 AM

## 2019-04-04 NOTE — PLAN OF CARE
Problem: Adult Behavioral Health Plan of Care  Goal: Plan of Care Review  Outcome: Ongoing (interventions implemented as appropriate)  The patient was out in the milieu during evening hours. He was attired in personal casual wear w/ an unkempt appearance. His affect was blunted, mood hopeful. He spoke of returning to [place] for rehab. He spoke of rehab w/ anticipation instead of frustration. He denies S/HI, A/VH. He is maintained on MVC, fall precautions. Supportive milieu maintained.

## 2019-04-05 VITALS
OXYGEN SATURATION: 98 % | SYSTOLIC BLOOD PRESSURE: 137 MMHG | BODY MASS INDEX: 30.9 KG/M2 | WEIGHT: 196.88 LBS | TEMPERATURE: 98 F | HEART RATE: 125 BPM | DIASTOLIC BLOOD PRESSURE: 70 MMHG | RESPIRATION RATE: 18 BRPM | HEIGHT: 67 IN

## 2019-04-05 PROBLEM — F10.939 ALCOHOL WITHDRAWAL: Status: RESOLVED | Noted: 2019-03-30 | Resolved: 2019-04-05

## 2019-04-05 LAB
HCV RNA SERPL NAA+PROBE-LOG IU: 6.48 LOG (10) IU/ML
HCV RNA SERPL QL NAA+PROBE: DETECTED IU/ML
HCV RNA SPEC NAA+PROBE-ACNC: ABNORMAL IU/ML

## 2019-04-05 PROCEDURE — 99239 PR HOSPITAL DISCHARGE DAY,>30 MIN: ICD-10-PCS | Mod: ,,, | Performed by: PSYCHIATRY & NEUROLOGY

## 2019-04-05 PROCEDURE — S4991 NICOTINE PATCH NONLEGEND: HCPCS | Performed by: PSYCHIATRY & NEUROLOGY

## 2019-04-05 PROCEDURE — 25000003 PHARM REV CODE 250: Performed by: PSYCHIATRY & NEUROLOGY

## 2019-04-05 PROCEDURE — 99239 HOSP IP/OBS DSCHRG MGMT >30: CPT | Mod: ,,, | Performed by: PSYCHIATRY & NEUROLOGY

## 2019-04-05 RX ORDER — IBUPROFEN 200 MG
1 TABLET ORAL DAILY
Qty: 30 PATCH | Refills: 11 | OUTPATIENT
Start: 2019-04-05 | End: 2019-04-05 | Stop reason: SDUPTHER

## 2019-04-05 RX ORDER — IBUPROFEN 200 MG
1 TABLET ORAL DAILY
Qty: 30 PATCH | Refills: 11 | Status: SHIPPED | OUTPATIENT
Start: 2019-04-05

## 2019-04-05 RX ADMIN — DIAZEPAM 5 MG: 5 TABLET ORAL at 08:04

## 2019-04-05 RX ADMIN — THERA TABS 1 TABLET: TAB at 08:04

## 2019-04-05 RX ADMIN — FOLIC ACID 1 MG: 1 TABLET ORAL at 08:04

## 2019-04-05 RX ADMIN — NICOTINE 1 PATCH: 21 PATCH, EXTENDED RELEASE TRANSDERMAL at 08:04

## 2019-04-05 RX ADMIN — Medication 100 MG: at 08:04

## 2019-04-05 RX ADMIN — PANTOPRAZOLE SODIUM 40 MG: 40 TABLET, DELAYED RELEASE ORAL at 08:04

## 2019-04-05 NOTE — ASSESSMENT & PLAN NOTE
Patient with history of GERD.  Normally takes Zantac (not on formulary).      - Started on famotidine 20mg BID, but patient reports continued symptoms without relief, so discontinued.  - Started Protonix 40mg daily on 4/3, tolerating well, improvement in dyspepsia  - F/u with out-patient GI

## 2019-04-05 NOTE — ASSESSMENT & PLAN NOTE
Patient reports previous diagnosis of HCV, but has not gotten treatment.    - HCV Ab positive  - Quantitative values pending, patient has signed 72 hour request for release, will follow up with out-patient GI in [place] and request records as needed

## 2019-04-05 NOTE — ASSESSMENT & PLAN NOTE
- R/O MDD  - Patient with long history of binge drinking behavior  - recent binge drinking, appears triggered by recent stressors including breakup with girlfriend  - Patient with 5/9 SIGECAPSD x2 weeks. Recent, persistent, and frequent SI reported.  - Patient amenable to inpatient psychiatric placement, signed in as a FVA, then signed 72 hours request for discharge        - Continued home meds at admit (Seroquel 300 mg PO nightly, Remeron 30 mg PO nightly, prazosin 2 mg PO nightly), patient reports good efficacy  - Increased prazosin to 3mg on 4/1  - Consider discontinuing remeron in favor of SSRI, but at this time will keep remeron on board given patient's recent weight loss w/ poor appetite and insomnia.   - Patient signed 72-hour release from FVA status on 4/2. Completed Valium taper prior to discharge.

## 2019-04-05 NOTE — DISCHARGE SUMMARY
"[facility]  Psychiatry  Discharge Summary      Patient Name: David Madrid  MRN: [id number]  Admission Date: 3/30/2019  Hospital Length of Stay: 6 days  Discharge Date and Time:  04/05/2019 9:47 AM  Attending Physician: Pedrito Ellis MD   Discharging Provider: Juliet Owens MD  Primary Care Provider: Primary Doctor No    HPI:   Per ER RN:  "Pt presents to ED with suicidal ideation since returning from [place] in 2001. Pt states that he thinks about committing suicide daily and even dreams about killing himself. Pt states that he has severe PTSD for time in the  and that he wakes up several times a night due to nightmares. Pt states that he has a plan to kill himself, but he has never acted upon thoughts because he has a son. Pt states that he has been abusing ETOH for the past few yrs and drinks at least 1 pint of liquor daily and smokes about 1 pck of cigarettes daily as well. Pt states that today he woke up on the floor and was tremoring due to withdrawal from alcohol which led him to drink 1 pint of whiskey and a few beers. Pt states that today he tried to get into a facility to complete a 28 day detox, but was denied. Pt reports that he has gone through two detox programs in the past few yrs. Pt is prescribed Seroquel 300 mg for depression, but reports that he doesn't take it as prescribed because he doesn't like the way it makes him feel. Pt denies illicit drug use. Pt states that he does have family support system and that he no longer works due to being a disabled . Pt AAOx4 and ambulates independently with steady gait.  "     Per ER MD:  "48 y/o M with a medical history of depression, alcohol abuse and Hepatitis C presents with suicidal intention. 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 Patient states that he normally drinks 12 beers a day but today he wanted to kill himself by "drinking " "himself to death". He drank 1 pint of whiskey and 6 beers. States he hasn't eaten in the past 3 days. Patient is originally from [place] and moved down here after he met a woman online. He had a fight with his girlfriend last week, which caused him to spiral. Patient is on seroquel 300 mg, rameron 30 mg and zantec.  "     ========     Patient alert, sitting in chair on entry to room with sitter at bedside. Calm, appropriate, and linear throughout with constricted affect. No objective signs psychosis or mansi during interview. Mansi screen negative re past hx. No history AVH, no current. Patient reports he arrived to the ER for "suicidal thoughts and alcohol detox". Patient reports SI over the past two years with progressive increase in frequency of SI, daily for the past two weeks. Says SI worsened after recent breakup with girlfriend 2 weeks ago. Describes intermittent SI throughout his life, but notes significant worsening after returning from  duty in [place] in 2001. Says since then SI has been off and on, and reports PTSD symptoms since that time of nightmares, night terrors, flashbacks, and exaggerated startle response. Says experiences nightmares nightly, though later in interview when discussing prazosin reports relief of these nightmares when taken. Denies any previous suicide attempts but does report instances of trying to "drink [himself] to death". Denies current plan, reports he did have a plan formulated last year for [id number]. Patient reports depressed mood x2 weeks after break up with girlfriend. Endorses neurovegetative symptoms of poor sleep (trouble falling, staying, + PTSD symptoms), anhedonia (previously enjoyed fishing), guilt and hopelessness ("shame" over current situation, hopelessness to future with notable lack of future-orientation when asked further), poor appetite with 10 lb weight loss over two weeks, and recent SI - 5/9 SIGECAPSD x2 weeks.      Patient reports he drinks " "alcohol daily, endorses rare marijuana use and denies other illicits. Patient says he usually drinks 2-3 beers daily, but describes binge-pattern behavior. Says over the past two days he increased his daily drinking to "at least a 12 pack and a pint of Kalyan Fonseca". Last drink was yesterday ~5 PM tail-end of a binge similar to level just described. Says this is typical for when he starts to binge. Patient was in retirement last week for domestic battery charges after him and girlfriend got into an argument, reports this was his last detox - was without medication, patient says it took 4 days for symptoms to subside and cites tremors, night sweats, insomnia, and jitters. Patient last went to inpatient rehab last year for alcohol, says "I've been trying to kick this habit a long time". Describes his alcohol use as "waxing and waning" consistent with binge drinking. Patient denies hx of known seizures but alarmingly reports that yesterday he had an episode where he fell, was shaking and bit his tongue. Denies incontinence during the episode.     Patient reports he has been on seroquel 300 mg PO qhs, remeron 30 mg PO qhs, and prazosin 2 mg PO qhs for several years. First filled in [place] but patient now gets them filled by a Dr. Bee on the [facility].     Past Psychiatric History:  Previous Medication Trials: seroquel , remeron, prazosin  Previous Psychiatric Hospitalizations: once, about a year ago in [place] for SI w/ no attempt  Previous Suicide Attempts: denied, though reports attempts to drink himself to death  History of Violence: denied  Outpatient psychiatrist: Dr. Bee on the [facility]      Social History:  Marital Status: single (recent breakup)  Children: 4 adult children  Source of Income: VA benefits and [employer] work ()  Education: HS grad, associates degree [employer]  Special Ed: denied  Housing Status: living with a friend   History of phys/sexual abuse: denied  Access to gun: denied     Substance " Use  Recreational Drugs: rare marijuana, denied others  Use of Alcohol: yes, heavy, see HPI  Tobacco Use: 1 ppd  Rehab History: 2x for alcohol, most recent last year inpatient  H/O Complicated Withdrawal: likely - denies known hx seizures but reports seizure like activity yesterday as noted in HPI      Legal History:  Past Charges/Incarcerations: yes , 6 1/2 years for drug charges  Pending charges: yes, domestic battery charges pending     Family Psychiatric History:  denied        Hospital Course:   Admitted to inpatient unit on 3/30. Upon admit, Valium taper initiated due to concern for withdrawal seizures, thiamine and folic acid were started, as were prazosin 2mg qHS, Remeron 30mg qHS, and Seroquel 300mg qHS. Patient complained of GERD symptoms so Pepcid started on 3/31; Pepcid ineffective so switched to Protonix. Patient continued to report withdrawal symptoms as of 4/1, but Valium taper was continued; prazosin increased to 3mg on 4/1 for continued PTSD nightmares (as well as elevated BP and tachycardia). Patient with good insight into his alcohol use disorder, and continues to desire residential rehab. Unable to obtain placement at rehab facility in [facility]. Patient signed a 72-hour release on 4/2 as he desired to return to [place] and complete rehab at a facility there. Mother aware of patient's plans and is supportive. Vitals remained stable while tapering down Valium. Completing Valium taper on 4/5. On day of discharge denies SI/HI/AVH, stable and safe for discharge; he will walk into [facility] for follow up.      * No surgery found *     Consults:   Physical Exam     Significant Labs:   Last 72 Hours:   Recent Lab Results     None        A1C: No results for input(s): HGBA1C in the last 48 hours.  Aspirin Level: No results for input(s): SALICYLATE LVL in the last 72 hours.  Bilirubin:   Recent Labs   Lab 03/29/19  2309   BILITOT 0.4     Blood Alcohol Level: No results for input(s): ALCOHOLMEDIC in the  last 48 hours.  CBC: No results for input(s): WBC, HGB, HCT, PLT in the last 48 hours.  CMP: No results for input(s): NA, K, CL, CO2, GLU, BUN, CREATININE, CALCIUM, PROT, ALBUMIN, BILITOT, ALKPHOS, AST, ALT, ANIONGAP, EGFRNONAA in the last 48 hours.    Invalid input(s): ESTGFAFRICA  HIV 1/2 Antibodies: No results for input(s): [id number] in the last 48 hours.  HIV Rapid: No results for input(s): HIVRAPID in the last 48 hours.  Lipid Panel: No results for input(s): CHOL, HDL, LDLCALC, TRIG, CHOLHDL in the last 48 hours.  Thyroid: No results for input(s): TSH, T4FREE, R6COVXR in the last 48 hours.  Urine Culture: No results for input(s): LABURIN in the last 48 hours.  Urine Drug Screen: No results for input(s): OTC URINE DRUG SCREEN - DRUG DETECTED, OTC URINE DRUG SCREEN - SUSPECT DRUG, OTC URINE DRUG SCREEN CHAIN OF CUSTODY in the last 72 hours.  Urine Studies: No results for input(s): COLORU, APPEARANCEUA, PHUR, SPECGRAV, PROTEINUA, GLUCUA, KETONESU, BILIRUBINUA, OCCULTUA, NITRITE, UROBILINOGEN, LEUKOCYTESUR, RBCUA, WBCUA, BACTERIA, SQUAMEPITHEL, HYALINECASTS in the last 48 hours.    Invalid input(s): WRIGHTSUR  Urine Toxicology: No results for input(s): LABBENZ, LABMETH, COCAINEURINE, OPIATESCREEN, BARBITURATES, AMPHETAMINES, MARIJUANATHC, PHENCYCLIDIN, POCCREAT, TOXINFO in the last 48 hours.    Invalid input(s): ETHANOLUR    Significant Imaging: I have reviewed all pertinent imaging results/findings within the past 24 hours.    Smoking Cessation:   Does the patient smoke? Yes  Does the patient want a prescription for Smoking Cessation? Yes  Does the patient want counseling for Smoking Cessation? Patient leaving state, will establish care in [place]          Pending Diagnostic Studies:     Procedure Component Value Units Date/Time    Hepatitis C RNA, quantitative, PCR [[id number]] Collected:  04/02/19 1024    Order Status:  Sent Lab Status:  In process Updated:  04/02/19 1032    Specimen:  Blood         Final Active  Diagnoses:    Diagnosis Date Noted POA    PRINCIPAL PROBLEM:  Alcohol-induced mood disorder [F10.94] 03/30/2019 Yes    Hepatitis C [B19.20] 04/02/2019 Yes    GERD (gastroesophageal reflux disease) [K21.9] 03/31/2019 Yes    Alcohol withdrawal [F10.239] 03/30/2019 Yes    PTSD (post-traumatic stress disorder) [F43.10] 03/30/2019 Yes    Alcohol use disorder, severe, dependence [F10.20] 03/30/2019 Yes      Problems Resolved During this Admission:      * Alcohol-induced mood disorder  - R/O MDD  - Patient with long history of binge drinking behavior  - recent binge drinking, appears triggered by recent stressors including breakup with girlfriend  - Patient with 5/9 SIGECAPSD x2 weeks. Recent, persistent, and frequent SI reported.  - Patient amenable to inpatient psychiatric placement, signed in as a FVA, then signed 72 hours request for discharge        - Continued home meds at admit (Seroquel 300 mg PO nightly, Remeron 30 mg PO nightly, prazosin 2 mg PO nightly), patient reports good efficacy  - Increased prazosin to 3mg on 4/1  - Consider discontinuing remeron in favor of SSRI, but at this time will keep remeron on board given patient's recent weight loss w/ poor appetite and insomnia.   - Patient signed 72-hour release from FVA status on 4/2. Completed Valium taper prior to discharge.    Hepatitis C  Patient reports previous diagnosis of HCV, but has not gotten treatment.    - HCV Ab positive  - Quantitative values pending, patient has signed 72 hour request for release, will follow up with out-patient GI in [place] and request records as needed     GERD (gastroesophageal reflux disease)  Patient with history of GERD.  Normally takes Zantac (not on formulary).      - Started on famotidine 20mg BID, but patient reports continued symptoms without relief, so discontinued.  - Started Protonix 40mg daily on 4/3, tolerating well, improvement in dyspepsia  - F/u with out-patient GI     Alcohol use disorder, severe,  dependence  Long history of alcohol use with a dependence and withdrawal.  Affecting mental health.  Needs rehab and counseling resources to follow up as outpatient. Motivated for sobriety. Showing good insight.    Plans to return to [place] and attend residential rehab    PTSD (post-traumatic stress disorder)  - symptoms began after returning from  duty in [place] in 2001  - endorses symptoms of nightmares, night terrors, exaggerated startle, hypervigilance, depressed mood when intrusive thoughts occur  - reports prazosin 2 mg PO nightly has helped alleviate night symptoms in the past; nightmares continued when restarted at 2mg qHS       - improved with prazosin 3mg PO nightly, continue on discharge  - monitored BP (stable)    Alcohol withdrawal  - Patient presented with symptoms of anxiety, tremors, insomnia  - in ER received Ativan 2 mg PO @ 0032 and Valium 10 mg IM @ 0832  - mild tachy at night with high of 100,  but most recent vitals improved (HR 89, /86)  - patient with concerning seizure-like activity reported Friday prior to admit, but denies hx known seizures otherwise  - EtOH 274 on arrival to ER (3/29/19 evening)    - received BZD as above in ER  - Valium taper initiated         Day 1 : valium 10 mg PO QID (got TID 3/30 as he received 10 mg IM in ER which will sub for first dose)         Day 2 : valium 10 mg PO TID on 3/31        Day 3 : valium 10 mg PO qAM, 5 at lunch, 10 qHS on 4/1        Day 4 : valium 10 mg PO qAM, 5 at lunch, 5 qHS on 4/2        Day 5 : valium 5 mg PO TID on 4/3        Day 6 : valium 5 mg PO BID on 4/4        Day 7 : valium 5 mg PO qAM on 4/5    - Ativan 2 mg PO q4h PRN for HR or DBP > 100  - Ativan 2 mg IM PRN for seizures  - PRN Imodium and Reglan added for diarrhea and nausea        Functional Condition: Independent ambulation    Discharged Condition: good    Disposition:     Follow Up:  Follow-up Information     [facility]. Go on 4/8/2019.     Why:  for psychiatric follow up and treatment of ETOH and PTSD. Walk in to establish care.  Contact information:  [address] [place] [place] [id number] [phone]               Patient Instructions:   No discharge procedures on file.  Medications:  Reconciled Home Medications:      Medication List      START taking these medications    mirtazapine 30 MG tablet  Commonly known as:  REMERON  Take 1 tablet (30 mg total) by mouth every evening.     pantoprazole 40 MG tablet  Commonly known as:  PROTONIX  Take 1 tablet (40 mg total) by mouth once daily.     prazosin 1 MG Cap  Commonly known as:  MINIPRESS  Take 3 capsules (3 mg total) by mouth every evening.        CHANGE how you take these medications    QUEtiapine 300 MG Tab  Commonly known as:  SEROQUEL  Take 1 tablet (300 mg total) by mouth every evening.  What changed:    · how much to take  · when to take this          Is patient being discharged on multiple antipsychotics? No        Total time:30 with greater than 50% of this time spent in counseling and/or coordination of care.     All elements of the transition record were discussed with the patient at discharge and patient agrees to this plan.    Juliet Owens MD  Psychiatry PGY II   [facility]

## 2019-04-05 NOTE — PLAN OF CARE
Problem: Adult Behavioral Health Plan of Care  Goal: Plan of Care Review  Outcome: Ongoing (interventions implemented as appropriate)  Visible on unit during evening hours.  VS: WNL.  Compliant with scheduled HS snack and medication.  No SI/HI/AVH or physical complaints reported thus far this shift.  Retired to bed at appropriate time.  Safety maintained.

## 2019-04-05 NOTE — PROGRESS NOTES
04/04/19 2000   [facility] Group Therapy   Group Name Community Reintegration   Specific Interventions Current Events   Participation Level Appropriate   Participation Quality Cooperative   Insight/Motivation Good   Affect/Mood Display Appropriate   Cognition Alert

## 2019-04-05 NOTE — ASSESSMENT & PLAN NOTE
Long history of alcohol use with a dependence and withdrawal.  Affecting mental health.  Needs rehab and counseling resources to follow up as outpatient. Motivated for sobriety. Showing good insight.    Plans to return to [place] and attend residential rehab

## 2019-04-05 NOTE — ASSESSMENT & PLAN NOTE
- symptoms began after returning from  duty in [place] in 2001  - endorses symptoms of nightmares, night terrors, exaggerated startle, hypervigilance, depressed mood when intrusive thoughts occur  - reports prazosin 2 mg PO nightly has helped alleviate night symptoms in the past; nightmares continued when restarted at 2mg qHS       - improved with prazosin 3mg PO nightly, continue on discharge  - monitored BP (stable)

## 2019-04-05 NOTE — NURSING
Pt is given detailed D/C instructions on medications and follow up appointments. Pt is given signed MD scripts. Pt denies SI/HI/AV hallucinations. He is future oriented, looking forward to being discharged. Pt is no distress, AAOX4, bright and compliant. He denies chest pain, SOB, stomach pain. Pt verbalizes understanding of all instructions. Pt signs form verifying that he has received ALL of his belongings and valuables. Pt is d/c to home with friend.

## 2019-09-25 ENCOUNTER — HISTORICAL (OUTPATIENT)
Dept: ADMINISTRATIVE | Facility: HOSPITAL | Age: 44
End: 2019-09-25

## 2019-09-27 LAB — FINAL CULTURE: NORMAL

## 2021-06-05 ENCOUNTER — HOSPITAL ENCOUNTER (INPATIENT)
Facility: HOSPITAL | Age: 46
LOS: 3 days | Discharge: HOME | End: 2021-06-08
Attending: PSYCHIATRY & NEUROLOGY | Admitting: PSYCHIATRY & NEUROLOGY

## 2021-06-05 ENCOUNTER — CONVERSION ENCOUNTER (OUTPATIENT)
Dept: EMERGENCY DEPT | Facility: HOSPITAL | Age: 46
End: 2021-06-05

## 2021-06-05 ENCOUNTER — HOSPITAL ENCOUNTER (EMERGENCY)
Facility: HOSPITAL | Age: 46
End: 2021-06-05
Attending: EMERGENCY MEDICINE | Admitting: EMERGENCY MEDICINE

## 2021-06-05 VITALS
WEIGHT: 235 LBS | HEART RATE: 67 BPM | SYSTOLIC BLOOD PRESSURE: 145 MMHG | BODY MASS INDEX: 36.88 KG/M2 | RESPIRATION RATE: 15 BRPM | HEIGHT: 67 IN | DIASTOLIC BLOOD PRESSURE: 84 MMHG | OXYGEN SATURATION: 94 % | TEMPERATURE: 98.5 F

## 2021-06-05 DIAGNOSIS — F33.2 SEVERE EPISODE OF RECURRENT MAJOR DEPRESSIVE DISORDER, WITHOUT PSYCHOTIC FEATURES (HCC): Primary | ICD-10-CM

## 2021-06-05 DIAGNOSIS — F10.229 ALCOHOL INTOXICATION IN ACTIVE ALCOHOLIC WITH COMPLICATION (HCC): ICD-10-CM

## 2021-06-05 LAB
ALBUMIN SERPL-MCNC: 4.7 G/DL (ref 3.5–5)
ALBUMIN/GLOB SERPL: 1.2 {RATIO} (ref 1.4–2.6)
ALP SERPL-CCNC: 110 U/L (ref 53–128)
ALT SERPL-CCNC: 154 U/L (ref 10–40)
AMPHETAMINES UR QL SCN: NEGATIVE
ANION GAP SERPL CALC-SCNC: 25 MMOL/L (ref 8–19)
APAP SERPL-MCNC: <5 UG/ML (ref 10–30)
APPEARANCE UR: CLEAR
AST SERPL-CCNC: 149 U/L (ref 15–50)
BARBITURATES UR QL SCN: NEGATIVE
BASOPHILS # BLD AUTO: 0.05 10*3/UL (ref 0–0.2)
BASOPHILS NFR BLD AUTO: 0.6 % (ref 0–3)
BENZODIAZ UR QL SCN: NEGATIVE
BILIRUB SERPL-MCNC: 0.91 MG/DL (ref 0.2–1.3)
BILIRUB UR QL: NEGATIVE
BUN SERPL-MCNC: 6 MG/DL (ref 5–25)
BUN/CREAT SERPL: 7 {RATIO} (ref 6–20)
CALCIUM SERPL-MCNC: 9.2 MG/DL (ref 8.7–10.4)
CHLORIDE SERPL-SCNC: 95 MMOL/L (ref 99–111)
COLOR UR: YELLOW
CONV ABS IMM GRAN: 0.03 10*3/UL (ref 0–0.2)
CONV CO2: 23 MMOL/L (ref 22–32)
CONV COCAINE, UR: NEGATIVE
CONV COLLECTION SOURCE (UA): ABNORMAL
CONV IMMATURE GRAN: 0.4 % (ref 0–1.8)
CONV TOTAL PROTEIN: 8.5 G/DL (ref 6.3–8.2)
CONV UROBILINOGEN IN URINE BY AUTOMATED TEST STRIP: 0.2 {EHRLICHU}/DL (ref 0.1–1)
CREAT UR-MCNC: 0.91 MG/DL (ref 0.7–1.2)
DEPRECATED RDW RBC AUTO: 49.4 FL (ref 35.1–43.9)
EOSINOPHIL # BLD AUTO: 0.01 10*3/UL (ref 0–0.7)
EOSINOPHIL # BLD AUTO: 0.1 % (ref 0–7)
ERYTHROCYTE [DISTWIDTH] IN BLOOD BY AUTOMATED COUNT: 14.1 % (ref 11.6–14.4)
ETHANOL BLD-MCNC: 397 MG/DL
ETHANOL BLD-MCNC: 92 MG/DL (ref 0–10)
ETHANOL BLD-MCNC: <10 MG/DL (ref 0–10)
ETHANOL UR QL: 0.09 %
ETHANOL UR QL: <0.01 %
GFR SERPLBLD BASED ON 1.73 SQ M-ARVRAT: >60 ML/MIN/{1.73_M2}
GLOBULIN UR ELPH-MCNC: 3.8 G/DL (ref 2–3.5)
GLUCOSE BLDC GLUCOMTR-MCNC: 118 MG/DL (ref 70–130)
GLUCOSE SERPL-MCNC: 90 MG/DL (ref 70–99)
GLUCOSE UR QL: NEGATIVE MG/DL
HCT VFR BLD AUTO: 50.6 % (ref 42–52)
HGB BLD-MCNC: 18 G/DL (ref 14–18)
HGB UR QL STRIP: NEGATIVE
KETONES UR QL STRIP: 15 MG/DL
LEUKOCYTE ESTERASE UR QL STRIP: NEGATIVE
LYMPHOCYTES # BLD AUTO: 3.66 10*3/UL (ref 1–5)
LYMPHOCYTES NFR BLD AUTO: 43.7 % (ref 20–45)
Lab: 371 (ref 273–304)
MCH RBC QN AUTO: 34.2 PG (ref 27–31)
MCHC RBC AUTO-ENTMCNC: 35.6 G/DL (ref 33–37)
MCV RBC AUTO: 96.2 FL (ref 80–96)
METHADONE UR QL SCN: NEGATIVE
MONOCYTES # BLD AUTO: 0.62 10*3/UL (ref 0.2–1.2)
MONOCYTES NFR BLD AUTO: 7.4 % (ref 3–10)
NEUTROPHILS # BLD AUTO: 4 10*3/UL (ref 2–8)
NEUTROPHILS NFR BLD AUTO: 47.8 % (ref 30–85)
NITRITE UR QL STRIP: NEGATIVE
NRBC CBCN: 0 % (ref 0–0.7)
OPIATES TESTED UR SCN: NEGATIVE
OSMOLALITY SERPL CALC.SUM OF ELEC: 285 MOSM/KG (ref 273–304)
OXYCODONE UR QL SCN: NEGATIVE
PCP UR QL: NEGATIVE
PH UR STRIP.AUTO: 6 [PH] (ref 5–8)
PLATELET # BLD AUTO: 381 10*3/UL (ref 130–400)
PMV BLD AUTO: 8.9 FL (ref 9.4–12.4)
POTASSIUM SERPL-SCNC: 3.9 MMOL/L (ref 3.5–5.3)
PROT UR QL: 15 MG/DL
QT INTERVAL: 339 MS
RBC # BLD AUTO: 5.26 10*6/UL (ref 4.7–6.1)
SALICYLATES SERPL-MCNC: <3 MG/DL (ref 3–27)
SARS-COV-2 RNA RESP QL NAA+PROBE: NOT DETECTED
SODIUM SERPL-SCNC: 139 MMOL/L (ref 135–147)
SP GR UR: 1.01 (ref 1–1.03)
THC SERPLBLD CFM-MCNC: POSITIVE NG/ML
TSH SERPL-ACNC: 3.07 M[IU]/L (ref 0.27–4.2)
WBC # BLD AUTO: 8.37 10*3/UL (ref 4.8–10.8)

## 2021-06-05 PROCEDURE — 36415 COLL VENOUS BLD VENIPUNCTURE: CPT

## 2021-06-05 PROCEDURE — 93005 ELECTROCARDIOGRAM TRACING: CPT | Performed by: EMERGENCY MEDICINE

## 2021-06-05 PROCEDURE — 82962 GLUCOSE BLOOD TEST: CPT

## 2021-06-05 PROCEDURE — 82077 ASSAY SPEC XCP UR&BREATH IA: CPT | Performed by: EMERGENCY MEDICINE

## 2021-06-05 PROCEDURE — 96375 TX/PRO/DX INJ NEW DRUG ADDON: CPT

## 2021-06-05 PROCEDURE — 25010000002 ONDANSETRON PER 1 MG: Performed by: EMERGENCY MEDICINE

## 2021-06-05 PROCEDURE — 96374 THER/PROPH/DIAG INJ IV PUSH: CPT

## 2021-06-05 PROCEDURE — 93010 ELECTROCARDIOGRAM REPORT: CPT | Performed by: INTERNAL MEDICINE

## 2021-06-05 PROCEDURE — U0003 INFECTIOUS AGENT DETECTION BY NUCLEIC ACID (DNA OR RNA); SEVERE ACUTE RESPIRATORY SYNDROME CORONAVIRUS 2 (SARS-COV-2) (CORONAVIRUS DISEASE [COVID-19]), AMPLIFIED PROBE TECHNIQUE, MAKING USE OF HIGH THROUGHPUT TECHNOLOGIES AS DESCRIBED BY CMS-2020-01-R: HCPCS | Performed by: EMERGENCY MEDICINE

## 2021-06-05 PROCEDURE — 25010000002 LORAZEPAM PER 2 MG: Performed by: PSYCHIATRY & NEUROLOGY

## 2021-06-05 PROCEDURE — 99285 EMERGENCY DEPT VISIT HI MDM: CPT

## 2021-06-05 RX ORDER — TRAZODONE HYDROCHLORIDE 50 MG/1
50 TABLET ORAL NIGHTLY PRN
Status: CANCELLED | OUTPATIENT
Start: 2021-06-05

## 2021-06-05 RX ORDER — LORAZEPAM 0.5 MG/1
1 TABLET ORAL
Status: DISCONTINUED | OUTPATIENT
Start: 2021-06-05 | End: 2021-06-05 | Stop reason: HOSPADM

## 2021-06-05 RX ORDER — LORAZEPAM 0.5 MG/1
2 TABLET ORAL
Status: DISCONTINUED | OUTPATIENT
Start: 2021-06-05 | End: 2021-06-05 | Stop reason: HOSPADM

## 2021-06-05 RX ORDER — IBUPROFEN 400 MG/1
400 TABLET ORAL ONCE
Status: COMPLETED | OUTPATIENT
Start: 2021-06-05 | End: 2021-06-05

## 2021-06-05 RX ORDER — LOPERAMIDE HYDROCHLORIDE 2 MG/1
2 CAPSULE ORAL
Status: DISCONTINUED | OUTPATIENT
Start: 2021-06-05 | End: 2021-06-07

## 2021-06-05 RX ORDER — LORAZEPAM 0.5 MG/1
1 TABLET ORAL
Status: DISCONTINUED | OUTPATIENT
Start: 2021-06-05 | End: 2021-06-06 | Stop reason: SDUPTHER

## 2021-06-05 RX ORDER — DIPHENOXYLATE HYDROCHLORIDE AND ATROPINE SULFATE 2.5; .025 MG/1; MG/1
1 TABLET ORAL DAILY
Status: CANCELLED | OUTPATIENT
Start: 2021-06-06 | End: 2021-06-09

## 2021-06-05 RX ORDER — LOPERAMIDE HYDROCHLORIDE 2 MG/1
2 CAPSULE ORAL
Status: CANCELLED | OUTPATIENT
Start: 2021-06-05

## 2021-06-05 RX ORDER — FOLIC ACID 1 MG/1
1 TABLET ORAL DAILY
Status: COMPLETED | OUTPATIENT
Start: 2021-06-06 | End: 2021-06-08

## 2021-06-05 RX ORDER — ONDANSETRON 4 MG/1
4 TABLET, FILM COATED ORAL EVERY 6 HOURS PRN
Status: CANCELLED | OUTPATIENT
Start: 2021-06-05

## 2021-06-05 RX ORDER — FOLIC ACID 1 MG/1
1 TABLET ORAL DAILY
Status: CANCELLED | OUTPATIENT
Start: 2021-06-06 | End: 2021-06-09

## 2021-06-05 RX ORDER — ONDANSETRON 2 MG/ML
4 INJECTION INTRAMUSCULAR; INTRAVENOUS ONCE
Status: COMPLETED | OUTPATIENT
Start: 2021-06-05 | End: 2021-06-05

## 2021-06-05 RX ORDER — QUETIAPINE 400 MG/1
400 TABLET, FILM COATED, EXTENDED RELEASE ORAL NIGHTLY
Status: ON HOLD | COMMUNITY
End: 2021-06-08 | Stop reason: SDUPTHER

## 2021-06-05 RX ORDER — ONDANSETRON 2 MG/ML
4 INJECTION INTRAMUSCULAR; INTRAVENOUS EVERY 6 HOURS PRN
Status: CANCELLED | OUTPATIENT
Start: 2021-06-05

## 2021-06-05 RX ORDER — LORAZEPAM 2 MG/ML
2 INJECTION INTRAMUSCULAR
Status: DISCONTINUED | OUTPATIENT
Start: 2021-06-05 | End: 2021-06-06 | Stop reason: SDUPTHER

## 2021-06-05 RX ORDER — LORAZEPAM 2 MG/ML
1 INJECTION INTRAMUSCULAR
Status: DISCONTINUED | OUTPATIENT
Start: 2021-06-05 | End: 2021-06-05 | Stop reason: HOSPADM

## 2021-06-05 RX ORDER — MULTIPLE VITAMINS W/ MINERALS TAB 9MG-400MCG
1 TAB ORAL DAILY
Status: COMPLETED | OUTPATIENT
Start: 2021-06-06 | End: 2021-06-08

## 2021-06-05 RX ORDER — LORAZEPAM 2 MG/ML
1 INJECTION INTRAMUSCULAR
Status: DISCONTINUED | OUTPATIENT
Start: 2021-06-05 | End: 2021-06-06 | Stop reason: SDUPTHER

## 2021-06-05 RX ORDER — MAGNESIUM HYDROXIDE/ALUMINUM HYDROXICE/SIMETHICONE 120; 1200; 1200 MG/30ML; MG/30ML; MG/30ML
15 SUSPENSION ORAL EVERY 6 HOURS PRN
Status: DISCONTINUED | OUTPATIENT
Start: 2021-06-05 | End: 2021-06-08 | Stop reason: HOSPADM

## 2021-06-05 RX ORDER — IBUPROFEN 400 MG/1
TABLET ORAL
Status: COMPLETED
Start: 2021-06-05 | End: 2021-06-05

## 2021-06-05 RX ORDER — ACETAMINOPHEN 325 MG/1
650 TABLET ORAL EVERY 4 HOURS PRN
Status: DISCONTINUED | OUTPATIENT
Start: 2021-06-05 | End: 2021-06-08 | Stop reason: HOSPADM

## 2021-06-05 RX ORDER — ACETAMINOPHEN 325 MG/1
650 TABLET ORAL EVERY 4 HOURS PRN
Status: CANCELLED | OUTPATIENT
Start: 2021-06-05

## 2021-06-05 RX ORDER — LORAZEPAM 2 MG/ML
2 INJECTION INTRAMUSCULAR
Status: DISCONTINUED | OUTPATIENT
Start: 2021-06-05 | End: 2021-06-05 | Stop reason: HOSPADM

## 2021-06-05 RX ORDER — HYDROXYZINE PAMOATE 25 MG/1
50 CAPSULE ORAL EVERY 6 HOURS PRN
Status: CANCELLED | OUTPATIENT
Start: 2021-06-05

## 2021-06-05 RX ORDER — ONDANSETRON 2 MG/ML
4 INJECTION INTRAMUSCULAR; INTRAVENOUS EVERY 6 HOURS PRN
Status: DISCONTINUED | OUTPATIENT
Start: 2021-06-05 | End: 2021-06-07

## 2021-06-05 RX ORDER — ONDANSETRON 4 MG/1
4 TABLET, FILM COATED ORAL EVERY 6 HOURS PRN
Status: DISCONTINUED | OUTPATIENT
Start: 2021-06-05 | End: 2021-06-07

## 2021-06-05 RX ORDER — TRAZODONE HYDROCHLORIDE 50 MG/1
50 TABLET ORAL NIGHTLY PRN
Status: DISCONTINUED | OUTPATIENT
Start: 2021-06-05 | End: 2021-06-08 | Stop reason: HOSPADM

## 2021-06-05 RX ORDER — ALUMINA, MAGNESIA, AND SIMETHICONE 2400; 2400; 240 MG/30ML; MG/30ML; MG/30ML
15 SUSPENSION ORAL EVERY 6 HOURS PRN
Status: CANCELLED | OUTPATIENT
Start: 2021-06-05

## 2021-06-05 RX ORDER — LORAZEPAM 2 MG/1
2 TABLET ORAL
Status: DISCONTINUED | OUTPATIENT
Start: 2021-06-05 | End: 2021-06-07

## 2021-06-05 RX ORDER — LORAZEPAM 2 MG/ML
2 INJECTION INTRAMUSCULAR
Status: DISCONTINUED | OUTPATIENT
Start: 2021-06-05 | End: 2021-06-07

## 2021-06-05 RX ADMIN — IBUPROFEN 400 MG: 400 TABLET ORAL at 03:21

## 2021-06-05 RX ADMIN — ONDANSETRON 4 MG: 2 INJECTION INTRAMUSCULAR; INTRAVENOUS at 12:50

## 2021-06-05 RX ADMIN — LORAZEPAM 1 MG: 2 INJECTION INTRAMUSCULAR; INTRAVENOUS at 20:18

## 2021-06-05 RX ADMIN — IBUPROFEN 400 MG: 400 TABLET, FILM COATED ORAL at 03:21

## 2021-06-05 NOTE — SIGNIFICANT NOTE
Pt BAL is now at 92. SW contacted [facility] for assessment per ED Provider request.

## 2021-06-05 NOTE — ED PROVIDER NOTES
I assumed care of this patient at approximately 0400 hrs.  He presented with acute alcohol intoxication and major depression with suicidal ideation and a plan for self-harm.  He underwent a period of observation until sobriety was obtained and at that time he underwent psychiatric evaluation and the recommendation is for his admission to psychiatric unit for further evaluation and definitive management of his severe depression with suicidal ideation.  He is stable on final assessment.  Psychiatric consultation is requested and pending.  He is in a close watch status.     Elmer Brandt MD  06/05/21 1177      ED Disposition     ED Disposition Condition Comment    Discharge to Behavioral Health  Level of Care: Psych [16]   Diagnosis: Severe episode of recurrent major depressive disorder, without psychotic features (CMS/[facility]) [[id number]]   Isolate for COVID?: No [0]   Certification: I Certify That Inpatient Hospital Services Are Medically Necessary For Greater Than 2 Midnights               Elmer Brandt MD  06/15/21 0609

## 2021-06-06 PROBLEM — F10.129 ALCOHOL ABUSE WITH INTOXICATION (HCC): Status: ACTIVE | Noted: 2021-06-06

## 2021-06-06 PROBLEM — F43.10 POST TRAUMATIC STRESS DISORDER (PTSD): Status: ACTIVE | Noted: 2021-06-06

## 2021-06-06 PROBLEM — F12.90 MARIJUANA USE: Status: ACTIVE | Noted: 2021-06-06

## 2021-06-06 PROBLEM — K21.9 GERD (GASTROESOPHAGEAL REFLUX DISEASE): Status: ACTIVE | Noted: 2021-06-06

## 2021-06-06 RX ORDER — HYDROXYZINE PAMOATE 50 MG/1
50 CAPSULE ORAL EVERY 4 HOURS PRN
Status: DISCONTINUED | OUTPATIENT
Start: 2021-06-06 | End: 2021-06-08 | Stop reason: HOSPADM

## 2021-06-06 RX ORDER — QUETIAPINE 200 MG/1
400 TABLET, FILM COATED, EXTENDED RELEASE ORAL NIGHTLY
Status: DISCONTINUED | OUTPATIENT
Start: 2021-06-06 | End: 2021-06-06 | Stop reason: SDUPTHER

## 2021-06-06 RX ORDER — PANTOPRAZOLE SODIUM 40 MG/1
40 TABLET, DELAYED RELEASE ORAL
Status: DISCONTINUED | OUTPATIENT
Start: 2021-06-07 | End: 2021-06-08 | Stop reason: HOSPADM

## 2021-06-06 RX ORDER — LORAZEPAM 2 MG/1
2 TABLET ORAL EVERY 4 HOURS PRN
Status: DISCONTINUED | OUTPATIENT
Start: 2021-06-06 | End: 2021-06-07

## 2021-06-06 RX ORDER — NICOTINE 21 MG/24HR
1 PATCH, TRANSDERMAL 24 HOURS TRANSDERMAL
Status: DISCONTINUED | OUTPATIENT
Start: 2021-06-06 | End: 2021-06-08 | Stop reason: HOSPADM

## 2021-06-06 RX ORDER — PRAZOSIN HYDROCHLORIDE 1 MG/1
4 CAPSULE ORAL NIGHTLY
Status: DISCONTINUED | OUTPATIENT
Start: 2021-06-06 | End: 2021-06-08 | Stop reason: HOSPADM

## 2021-06-06 RX ORDER — HALOPERIDOL 5 MG/1
5 TABLET ORAL EVERY 4 HOURS PRN
Status: DISCONTINUED | OUTPATIENT
Start: 2021-06-06 | End: 2021-06-08 | Stop reason: HOSPADM

## 2021-06-06 RX ORDER — OMEPRAZOLE 40 MG/1
40 CAPSULE, DELAYED RELEASE ORAL DAILY
Status: DISCONTINUED | OUTPATIENT
Start: 2021-06-06 | End: 2021-06-06

## 2021-06-06 RX ORDER — QUETIAPINE 200 MG/1
400 TABLET, FILM COATED, EXTENDED RELEASE ORAL NIGHTLY
Status: DISCONTINUED | OUTPATIENT
Start: 2021-06-06 | End: 2021-06-06

## 2021-06-06 RX ORDER — QUETIAPINE 200 MG/1
400 TABLET, FILM COATED, EXTENDED RELEASE ORAL NIGHTLY
Status: DISCONTINUED | OUTPATIENT
Start: 2021-06-06 | End: 2021-06-08 | Stop reason: HOSPADM

## 2021-06-06 RX ORDER — DIPHENHYDRAMINE HCL 50 MG
50 CAPSULE ORAL EVERY 4 HOURS PRN
Status: DISCONTINUED | OUTPATIENT
Start: 2021-06-06 | End: 2021-06-08 | Stop reason: HOSPADM

## 2021-06-06 RX ADMIN — FOLIC ACID 1 MG: 1 TABLET ORAL at 09:27

## 2021-06-06 RX ADMIN — I-VITE, TAB 1000-60-2MG (60/BT) 1 TABLET: TAB at 09:27

## 2021-06-06 RX ADMIN — PRAZOSIN HYDROCHLORIDE 4 MG: 1 CAPSULE ORAL at 20:58

## 2021-06-06 RX ADMIN — HYDROXYZINE PAMOATE 50 MG: 50 CAPSULE ORAL at 15:54

## 2021-06-06 RX ADMIN — HYDROXYZINE PAMOATE 50 MG: 50 CAPSULE ORAL at 20:57

## 2021-06-06 RX ADMIN — QUETIAPINE FUMARATE 400 MG: 200 TABLET, EXTENDED RELEASE ORAL at 21:24

## 2021-06-06 RX ADMIN — NICOTINE 1 PATCH: 21 PATCH, EXTENDED RELEASE TRANSDERMAL at 09:26

## 2021-06-06 RX ADMIN — LORAZEPAM 2 MG: 2 TABLET ORAL at 01:03

## 2021-06-06 RX ADMIN — TRAZODONE HYDROCHLORIDE 50 MG: 50 TABLET ORAL at 21:00

## 2021-06-06 RX ADMIN — TRAZODONE HYDROCHLORIDE 50 MG: 50 TABLET ORAL at 01:03

## 2021-06-06 RX ADMIN — Medication 100 MG: at 09:27

## 2021-06-06 NOTE — SIGNIFICANT NOTE
PATIENT ALERT AND ORIENTED. CALM AND COOPERATIVE WITH STAFF. COMPLIANT WITH MEDICATIONS. DENIES S/I, H/I OR HALLUCINATIONS. PATIENT CONTINUES TO COMPLAIN OF DT'S BUT NO SCORING LOW ON CIWA SCALE. PATIENT REPORTS HE FEELS REALLY TIRED AND RATES HIS DEPRESSION AT A 3. NO INAPPROPRIATE OR AGGRESSIVE BEHAVIOR NOTED. WILL CONTINUE TO MONITOR. KELLY HYMAN R.N.

## 2021-06-06 NOTE — PLAN OF CARE
Goal Outcome Evaluation:     Progress: improving  Patient continues to complain of DT'S but not scoring high enough on ciwa scale to receive medication. Patient eating and drinking and up and about on unit.

## 2021-06-06 NOTE — ED NOTES
PT D/C'D WITH SECURITY AND TECH PER WHEELCHAIR TO [facility] IN STABLE CONDITION      Helena Hussein RN  06/05/21 5292

## 2021-06-06 NOTE — H&P
"[facility]   PSYCHIATRIC  HISTORY AND PHYSICAL    Patient Name: Tramaine De Leon  : 1975  MRN: [id number]  Primary Care Physician:  Provider, No Known  Date of admission: 2021    Subjective   Subjective     Chief Complaint: \"Alcohol and not medication compliant, ran out of my meds 2 months ago and has been self-medicating.\"    HPI:     Tramaine De Leon is a 46 y.o. male that presented to the emergency room acutely intoxicated reporting depression with suicidal ideations.  Patient reported a plan in the emergency department evaluate harm himself.  Patient reports he ran out of his quetiapine and prazosin and has not had any way to get them refilled here in [place].  He is only been in [place] for a couple months.  Patient reports that without medications he is not been able to sleep.  Reports he is had bad dreams and nightmares, as well as some flashbacks.  Patient reports that he thinks he had a seizure yesterday because he had some \"lost time\" and has a period of not recalling what it happened.  He was significantly intoxicated suspect he may just been having a blackout.  He has no previous seizure history.  Patient reports that he has suicidal ideations quite frequently and sometimes just gets to the point where he cannot deal with it and that occurred yesterday.  Patient reports that he is depressed and describes having no energy, just wants to sit, has poor appetite, cannot sleep, and has feelings of hopelessness and helplessness.  He denies any auditory visual hallucinations.    Patient reports a long history of alcohol misuse.  Reports that he had his first drink in his 20s, began drinking daily for the first time in his 30s.  His longest sobriety was about 2 years which was from 2017 through 2018.  He reports he is currently drinking 12 beers a day.  He has no history of withdrawal seizures or delirium tremens.  Patient reports he is having current withdrawal symptoms today including hot " "and cold flashes, sweats, being shaky, and some stomach upset.  He has required 1 dose of lorazepam for acute withdrawal symptoms.    Review of Systems   All systems were reviewed and negative except for: Hot and cold sweats, tremor, feeling shaky    Personal History     Past Medical History:   Diagnosis Date   • Alcohol abuse    • Anxiety    • Depression    • PTSD (post-traumatic stress disorder)    • Seizures (CMS/HCC)        Past Surgical History:   Procedure Laterality Date   • BACK SURGERY         Past Psychiatric History:    Psychiatric Hospitalizations: 1    Suicide Attempts: Denies any previous attempts    Prior Treatment and Medications Tried: Previously saw a provider in [place].  No current psychiatrist.  Previous medications of quetiapine and prazosin worked very well and would like to reinitiate    History of violence or legal issues: None known    Family History: None known to patient    Family Psych History: Patient denies any known history    Family Substance Abuse History: Father reportedly had alcohol abuse problems    Family Suicide History: Patient denies any known suicide history in the family    Social History: Patient came for  family was born and raised in [place].  He is a high school graduate.  Patient is only been in [place] for a few months and moved here because he had family in the region.  He is a high school graduate.  He is currently on disability.  He has been  for some 1 occasion.  He has 1 child.  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  He reports that he is \"sometimes\" Hoahaoism or spiritual.  Denies any history of abuse.      Substance Abuse History:  reports that he has been smoking cigarettes. He has been smoking about 1.00 pack per day. He has never used smokeless tobacco. He reports current alcohol use. He reports current drug use. Drug: Marijuana.    Home Medications:  Prazosin HCl, QUEtiapine XR, and " omeprazole      Allergies:  No Known Allergies    Objective   Objective     Vitals:   Temp:  [98 °F (36.7 °C)-98.7 °F (37.1 °C)] 98 °F (36.7 °C)  Heart Rate:  [] 79  Resp:  [11-20] 18  BP: (128-162)/() 151/103  Physical Exam     Patient refused physical exam today.    He is awake and alert, normocephalic/atraumatic, normal facial expressions, hearing grossly intact, phonation normal  Respirations even unlabored no audible wheeze or stridor  Skin is clean dry and intact  Strength is intact, full range of motion in all extremities  Appears healthy    Mental Status Exam:    Hygiene:   fair  Cooperation:  Cooperative, a little annoyed by interview, limited responses and answers at times  Eye Contact:  Fair  Psychomotor Behavior:  Appropriate  Affect:  Irritable  Hopelessness: 4  Speech:  Normal and Somewhat limited  Thought Progress:  Linear  Thought Content:  Normal  Suicidal:  None and Denies any suicidal ideations today  Homicidal:  None  Hallucinations:  None  Delusion:  None  Memory:  Intact  Orientation:  Person, Place, Time and Situation  Reliability:  good  Insight:  Fair  Judgement:  Fair  Impulse Control:  Good  Physical/Medical Issues:  No   No acute agitation, average intelligence fair historian.  Seems to get annoyed his conversation goes on.  Does participate freely    Result Review    Result Review:  I have personally reviewed the results from the time of this admission to 6/6/2021 13:00 EDT and agree with these findings:  [x]  Laboratory  []  Microbiology  []  Radiology  []  EKG/Telemetry   []  Cardiology/Vascular   []  Pathology  []  Old records  []  Other:  Most notable findings include: Alcohol level of greater than 397    Assessment/Plan   Assessment / Plan     Brief Patient Summary:  Tramaine De Leon is a 46 y.o. male who presented to the emergency room intoxicated with exacerbation of depression and PTSD and suicidal ideations.  Patient has been off his medications for up to 2  months.    Active Hospital Problems:  Active Hospital Problems    Diagnosis    • Alcohol abuse with intoxication (CMS/HCC)    • Post traumatic stress disorder (PTSD)    • GERD (gastroesophageal reflux disease)    • Marijuana use    • Severe episode of recurrent major depressive disorder, without psychotic features (CMS/HCC)        Plan:     1) Admit for safety and stabilization begin treatment for depression and PTSD with appropriate medications and will reinitiate quetiapine and prazosin  2) monitor for signs and symptoms of alcohol detoxification and treat symptoms accordingly  3) patient engage in all group and individual treatment modalities available on the unit  4) work on mood stabilization improvement of depressive symptoms and abatement of suicidal ideations  5) work on safety plan  6) attempt again any collateral information available  7) provide supportive therapy  8) make referrals to substance abuse treatment counseling  9.)  Work on appropriate disposition and follow-up   10) estimate length stay in hospital 2 to 3 days    DVT prophylaxis:  Mechanical DVT prophylaxis orders are present.    CODE STATUS:    Code Status: CPR  Medical Interventions (Level of Support Prior to Arrest): Full      Admission Status:  I believe this patient meets admission criteria status.    Electronically signed by Dillan Breaux MD, 06/06/21, 12:10 PM EDT.

## 2021-06-06 NOTE — PLAN OF CARE
Goal Outcome Evaluation:   Pt arrives to this unit via wheelchair as a Voluntary admission with complaints of depression and suicidal thoughts. Pt is alert, oriented and in moderate withdrawal distress. Pt arrived with 2 security officers and closewatch tech. Pt was searched per  protocol with belongings secured. Pt UDS was positive for THC. Pt was medicated with Ativan per [facility] protocol. -- AS RN

## 2021-06-07 VITALS
BODY MASS INDEX: 37.02 KG/M2 | WEIGHT: 235.89 LBS | OXYGEN SATURATION: 96 % | SYSTOLIC BLOOD PRESSURE: 117 MMHG | HEART RATE: 65 BPM | HEIGHT: 67 IN | RESPIRATION RATE: 16 BRPM | TEMPERATURE: 98 F | DIASTOLIC BLOOD PRESSURE: 82 MMHG

## 2021-06-07 LAB
CHOLEST SERPL-MCNC: 211 MG/DL (ref 0–200)
GLUCOSE P FAST SERPL-MCNC: 100 MG/DL (ref 74–106)
HDLC SERPL-MCNC: 89 MG/DL (ref 40–60)
LDLC SERPL CALC-MCNC: 103 MG/DL (ref 0–100)
LDLC/HDLC SERPL: 1.13 {RATIO}
TRIGL SERPL-MCNC: 108 MG/DL (ref 0–150)
VLDLC SERPL-MCNC: 19 MG/DL (ref 5–40)

## 2021-06-07 PROCEDURE — 82947 ASSAY GLUCOSE BLOOD QUANT: CPT | Performed by: PSYCHIATRY & NEUROLOGY

## 2021-06-07 PROCEDURE — 80061 LIPID PANEL: CPT | Performed by: PSYCHIATRY & NEUROLOGY

## 2021-06-07 RX ORDER — QUETIAPINE FUMARATE 25 MG/1
50 TABLET, FILM COATED ORAL DAILY
Status: DISCONTINUED | OUTPATIENT
Start: 2021-06-07 | End: 2021-06-08 | Stop reason: HOSPADM

## 2021-06-07 RX ADMIN — HYDROXYZINE PAMOATE 50 MG: 50 CAPSULE ORAL at 12:05

## 2021-06-07 RX ADMIN — PANTOPRAZOLE SODIUM 40 MG: 40 TABLET, DELAYED RELEASE ORAL at 07:00

## 2021-06-07 RX ADMIN — TRAZODONE HYDROCHLORIDE 50 MG: 50 TABLET ORAL at 21:07

## 2021-06-07 RX ADMIN — Medication 100 MG: at 08:06

## 2021-06-07 RX ADMIN — HYDROXYZINE PAMOATE 50 MG: 50 CAPSULE ORAL at 08:03

## 2021-06-07 RX ADMIN — NICOTINE 1 PATCH: 21 PATCH, EXTENDED RELEASE TRANSDERMAL at 08:04

## 2021-06-07 RX ADMIN — HYDROXYZINE PAMOATE 50 MG: 50 CAPSULE ORAL at 17:41

## 2021-06-07 RX ADMIN — QUETIAPINE FUMARATE 400 MG: 200 TABLET, EXTENDED RELEASE ORAL at 21:07

## 2021-06-07 RX ADMIN — FOLIC ACID 1 MG: 1 TABLET ORAL at 08:07

## 2021-06-07 RX ADMIN — I-VITE, TAB 1000-60-2MG (60/BT) 1 TABLET: TAB at 08:06

## 2021-06-07 RX ADMIN — QUETIAPINE FUMARATE 50 MG: 25 TABLET ORAL at 11:34

## 2021-06-07 RX ADMIN — PRAZOSIN HYDROCHLORIDE 4 MG: 1 CAPSULE ORAL at 20:58

## 2021-06-07 NOTE — PROGRESS NOTES
[facility]     Psychiatric Progress Note    Patient Name: Tramaine De Leon  : 1975  MRN: [id number]  Primary Care Physician:  Provider, No Known  Date of admission: 2021    Subjective   Subjective     Chief Complaint: Depression and alcohol misuse    HPI:  Patient Reports that he continues to feel very tired and did not sleep at all last night.  He reports he did get his regular evening dose of quetiapine and it has not been as effective.  Patient denies any suicidal ideations today.  Patient reports has been compliant with medications.  Patient reports his mood is been agitated.  Patient reports that he does not want to go to alcohol rehabilitation because he is been there in the past.  He reports he is current with the [employer] [facility] here in town has been active in AA before and knows what he needs to do to achieve and maintain his sobriety.  He is noted to continue to be irritable according to staff.  He is less irritable pool today than he was yesterday.      Staff reports patient also has been complaining of some anxiety and required a as needed dose of Vistaril this morning.    Patient denies any signs and symptoms of alcohol withdrawal.  Staff reports he is detox protocol scores have been very low.    Continues to complain of increased irritability and agitation.  Reports he had been on a morning dose of quetiapine and will reinitiate 50 mg quetiapine every morning.    Review of Systems   The following systems were reviewed and negative;constitution, denies tremor, sweats, headache, nausea, vomiting or any other symptoms of withdrawal.    Objective   Objective     Vitals:   Temp:  [97.6 °F (36.4 °C)-98.1 °F (36.7 °C)] 97.7 °F (36.5 °C)  Heart Rate:  [] 107  Resp:  [16-20] 18  BP: (116-147)/() 136/92      Mental Status Exam:    Hygiene:   good  Cooperation:  Cooperative  Eye Contact:  Fair  Psychomotor Behavior:  Irritable, but less so than yesterday  Affect:  Restricted  Speech:   "Normal  Thought Progress:  Goal directed and Linear  Thought Content:  Normal  Suicidal:  None  Homicidal:  None  Hallucinations:  None  Memory:  Intact  Orientation:  Person, Place, Time and Situation  Reliability:  good  Insight:  Fair  Judgement:  Fair  Mood described as \"agitated\"    Result Review    Result Review:  I have personally reviewed the results from the time of this admission to 6/7/2021 10:46 EDT and agree with these findings:  []  Laboratory  []  Microbiology  []  Radiology  []  EKG/Telemetry   []  Cardiology/Vascular   []  Pathology  []  Old records  []  Other:  Most notable findings include: CIWA    Assessment/Plan   Assessment / Plan     Brief Patient Summary:  Tramaine De Leon is a 46 y.o. male who presented to the emergency room acutely intoxicated on alcohol with suicidal ideations.  Had been off all medications which have been reinitiated.  Is improving.  No symptoms of withdrawal at this time    Active Hospital Problems:  Active Hospital Problems    Diagnosis    • Alcohol abuse with intoxication (CMS/HCC)    • Post traumatic stress disorder (PTSD)    • GERD (gastroesophageal reflux disease)    • Marijuana use    • Severe episode of recurrent major depressive disorder, without psychotic features (CMS/HCC)        Plan:   Add 50 mg quetiapine every a.m.  Continue other meds as ordered and titrate as clinically indicated  Discontinue detox protocol  Work on mood stabilization  Work on abatement of suicidal ideation appropriate safety plan  Continue supportive therapy  Patient engage in all group and individual treatment modalities available on unit  Work on appropriate disposition and follow-up    Disposition:  I expect patient to be discharged if stable anticipate tomorrow.    Electronically signed by Dillan Breaux MD, 06/07/21, 10:46 AM EDT.    "

## 2021-06-07 NOTE — NURSING NOTE
Pt. Was irritable this morning, reporting feeling anxious due to lack of sleep and night shift making noises when checking on him and that he was tired. Pt. deneid any si/hi/avh and contracted for safety. Pt.not having any s/s at this time will con't to monitor CIWA and provide a safe environment.

## 2021-06-07 NOTE — PLAN OF CARE
Goal Outcome Evaluation:  Plan of Care Reviewed With: patient  Progress: improving   Pt is progressing, using humor, interacting with staff and peers appropriately.

## 2021-06-07 NOTE — NURSING NOTE
PT ALERT AND ORIENTED. COOPERATIVE WITH STAFF. MED COMPLIANT, MAKES NEEDS KNOWN. COMPLAINS OF DT S/S BUT NOT SCORING OVER 3 AT THIS TIME. PT HYPER-FOCUSED ON SEROQUEL THIS EVENING, PT STATED HE HASN'T HAD IT SINCE April AND HAS BEEN SELF MEDICATING R/T NOT BEING ABLE TO SLEEP. PT DENIES SI, HI, AND A/V HALLUCINATIONS AT THIS TIME. NO INAPPROPRIATE OR AGGRESSIVE BX NOTED. PT HAS BEEN UP TO DAYROOM WITH PEERS, SMILING AND TALKING TO PEER. PLEASANT ON APPROACH. WILL CONT. TO MONITOR FOR SAFETY AND MOOD. SANDOR THOMAS

## 2021-06-08 RX ORDER — QUETIAPINE FUMARATE 50 MG/1
50 TABLET, FILM COATED ORAL DAILY
Qty: 30 TABLET | Refills: 1 | Status: SHIPPED | OUTPATIENT
Start: 2021-06-09 | End: 2022-03-17

## 2021-06-08 RX ORDER — QUETIAPINE 400 MG/1
400 TABLET, FILM COATED, EXTENDED RELEASE ORAL NIGHTLY
Qty: 30 TABLET | Refills: 1 | Status: SHIPPED | OUTPATIENT
Start: 2021-06-08

## 2021-06-08 RX ORDER — PRAZOSIN HYDROCHLORIDE 2 MG/1
4 CAPSULE ORAL NIGHTLY
Qty: 60 CAPSULE | Refills: 1 | Status: SHIPPED | OUTPATIENT
Start: 2021-06-08

## 2021-06-08 RX ORDER — TRAZODONE HYDROCHLORIDE 50 MG/1
100 TABLET ORAL NIGHTLY PRN
Qty: 30 TABLET | Refills: 1 | Status: SHIPPED | OUTPATIENT
Start: 2021-06-08

## 2021-06-08 RX ADMIN — Medication 100 MG: at 09:01

## 2021-06-08 RX ADMIN — PANTOPRAZOLE SODIUM 40 MG: 40 TABLET, DELAYED RELEASE ORAL at 06:02

## 2021-06-08 RX ADMIN — I-VITE, TAB 1000-60-2MG (60/BT) 1 TABLET: TAB at 09:01

## 2021-06-08 RX ADMIN — QUETIAPINE FUMARATE 50 MG: 25 TABLET ORAL at 09:01

## 2021-06-08 RX ADMIN — FOLIC ACID 1 MG: 1 TABLET ORAL at 09:01

## 2021-06-08 NOTE — PLAN OF CARE
Goal Outcome Evaluation:  Plan of Care Reviewed With: patient  Patient Agreement with Plan of Care: agrees     Progress: improving   Pt calm, cooperative, mood stable. Pt med compliant. Pt stays up in dayroom, interacting with staff and peers appropriately, polite. Denies SI, HI, and A/V Hallucinations. Rates depression, anxiety and hopelessness as low at 2/10. Will cont. To monitor for safety and mood. Deana dover

## 2021-06-08 NOTE — DISCHARGE SUMMARY
[facility] [place] [facility]         DISCHARGE SUMMARY    Patient Name: Tramaine De Leon  : 1975  MRN: [id number]    Date of Admission: 2021  Date of Discharge: 2021  Primary Care Physician: Provider, No Known    Consults     No orders found from 2021 to 2021.          Presenting Problem:   Severe episode of recurrent major depressive disorder, without psychotic features (CMS/HCC) [F33.2]    Active and Resolved Hospital Problems:  Active Hospital Problems    Diagnosis POA   • Alcohol abuse with intoxication (CMS/HCC) [F10.129] Yes     Priority: High   • Post traumatic stress disorder (PTSD) [F43.10] Yes     Priority: High   • GERD (gastroesophageal reflux disease) [K21.9] Yes   • Marijuana use [F12.90] Yes   • Severe episode of recurrent major depressive disorder, without psychotic features (CMS/HCC) [F33.2] Yes      Resolved Hospital Problems   No resolved problems to display.         Hospital Course     Hospital Course:  Tramaine De Leon is a 46 y.o. male presented to the emergency room acutely intoxicated on alcohol with suicidal ideation and exacerbation of posttraumatic stress disorder and depression.  Patient was detoxified from alcohol and emergency room continued to voice suicidal ideations and admitted like spring.    Patient was placed on the detox protocol and given a 3-day course of vitamins including a multivitamin, thiamine, and folate.  No IV fluids were required to patient replenished fluids with by mouth hydration.  Patient initially scored high on the CIWA protocol and complained of being shaky, and having hot and cold sweats as well as nausea.  He required 2 doses of lorazepam early in his hospital stay for withdrawal symptoms and received his last dose at about 1 AM on .  He is now gone almost 60 hours without any withdrawal symptoms or need for lorazepam.    He did not require any medications for acute agitation or combativeness.  He did complain of some anxiety  and was given Vistaril on an as-needed basis and is required 2-3 doses per day in the early part of his hospital stay.  Patient also had some difficulty sleeping and he did require trazodone throughout his stay and this will be continued at discharge.    Patient reported been off of his medications for couple months and is part of the reason that his symptoms of PTSD and depression were increased.  He reports that previous medication regimen of quetiapine of a small dose in the morning and 400 mg in the evenings or what best help his mood and control symptoms.  He also reports that he has nightmares and takes prazosin as an outpatient.  He was started back on quetiapine and prazosin while in the hospital.  His mood and affect significantly improved over the course of his stay.  He has been calm and cooperative.    Patient reports that he is significantly improved.  Reports he is slept well and feels much better.  Also reports that he has been sober in the past and has been active with outpatient recovery programs and plans on getting back in them.  He is denying suicidal ideations at this time.  Patient was initially somewhat irritable and difficult to engage in his affect and mood is significantly improved.  There is no psychomotor restlessness or agitation.  Patient at this point is calm and cooperative is been back on of his medications with no acute anxiety denying suicidal ideations and will be discharged today        DISCHARGE Follow Up Recommendations for labs and diagnostics: Lipid and glucose monitoring with primary care provider      Day of Discharge     Vital Signs:  Temp:  [98 °F (36.7 °C)] 98 °F (36.7 °C)  Heart Rate:  [65-85] 65  Resp:  [16-18] 16  BP: (113-117)/(82-83) 117/82      Pertinent  and/or Most Recent Results     LAB RESULTS:      Lab 06/05/21  0045   WBC 8.37   HEMOGLOBIN 18.0   HEMATOCRIT 50.6   PLATELETS 381   NEUTROS ABS 4.00   LYMPHS ABS 3.66   MONOS ABS 0.62   EOS ABS 0.01   MCV 96.2*          Lab 06/05/21  0045   SODIUM 139   POTASSIUM 3.9   CHLORIDE 95*   TOTAL CO2 23   ANION GAP 25*   BUN 6   CREATININE 0.91   CALCIUM 9.2   TSH 3.070         Lab 06/05/21  0045   TOTAL PROTEIN 8.5*   ALBUMIN 4.7   GLOBULIN 3.8*   ALT (SGPT) 154*   AST (SGOT) 149*   BILIRUBIN 0.91   ALK PHOS 110             Lab 06/07/21  0506   CHOLESTEROL 211*   LDL CHOL 103*   HDL CHOL 89*   TRIGLYCERIDES 108             Brief Urine Lab Results  (Last result in the past 365 days)      Color   Clarity   Blood   Leuk Est   Nitrite   Protein   CREAT   Urine HCG        06/05/21 0045   CLEAR NEGATIVE NEGATIVE  Comment:  URINE MICROSCOPICS:    Only performed if any of the following are present:    Appearance is Sl Cloudy to Turbid; Protein is    30 to >/= 300 mg/dL; Occult Blood, Nitrite, or Leukocyte    Esterase is positive. Color is Red or Orange.   NEGATIVE               Microbiology Results (last 10 days)     Procedure Component Value - Date/Time    COVID PRE-OP / PRE-PROCEDURE SCREENING ORDER (NO ISOLATION) - Swab, Nasopharynx [[id number]]  (Normal) Collected: 06/05/21 2051    Lab Status: Final result Specimen: Swab from Nasopharynx Updated: 06/05/21 2228    Narrative:      The following orders were created for panel order COVID PRE-OP / PRE-PROCEDURE SCREENING ORDER (NO ISOLATION) - Swab, Nasopharynx.  Procedure                               Abnormality         Status                     ---------                               -----------         ------                     COVID-19,CEPHEID,COR/BORIS...[[id number]]  Normal              Final result                 Please view results for these tests on the individual orders.    COVID-19,CEPHEID,COR/BORIS/PAD/BUD IN-HOUSE(OR EMERGENT/ADD-ON),NP SWAB IN TRANSPORT MEDIA 3-4 HR TAT, RT-PCR - Swab, Nasopharynx [[id number]]  (Normal) Collected: 06/05/21 2051    Lab Status: Final result Specimen: Swab from Nasopharynx Updated: 06/05/21 2228     COVID19 Not Detected    Narrative:      Fact  sheet for providers: https://www.fda.gov/media/551042/download     Fact sheet for patients: https://www.fda.gov/media/879064/download  Fact sheet for providers: https://www.fda.gov/media/163934/download     Fact sheet for patients: https://www.fda.gov/media/294002/download                           Imaging Results (Last 7 Days)     ** No results found for the last 168 hours. **           Labs Pending at Discharge:        Discharge Details        Discharge Medications      New Medications      Instructions Start Date   traZODone 50 MG tablet  Commonly known as: DESYREL   100 mg, Oral, Nightly PRN         Changes to Medications      Instructions Start Date   prazosin 2 MG capsule  Commonly known as: Minipress  What changed:   · medication strength  · when to take this   4 mg, Oral, Nightly      QUEtiapine  MG 24 hr tablet  Commonly known as: SEROquel XR  What changed: Another medication with the same name was added. Make sure you understand how and when to take each.   400 mg, Oral, Nightly      QUEtiapine 50 MG tablet  Commonly known as: SEROquel  What changed: You were already taking a medication with the same name, and this prescription was added. Make sure you understand how and when to take each.   50 mg, Oral, Daily   Start Date: June 9, 2021        Continue These Medications      Instructions Start Date   omeprazole 40 MG capsule  Commonly known as: priLOSEC   40 mg, Oral, Daily             No Known Allergies      Discharge Disposition:  Home or Self Care    Diet:  Hospital:  Diet Order   Procedures   • Diet Regular         Discharge Activity:   Activity Instructions     Activity as Tolerated              CODE STATUS:  Code Status and Medical Interventions:   Ordered at: 06/06/21 0933     Code Status:    CPR     Medical Interventions (Level of Support Prior to Arrest):    Full         No future appointments.    Additional Instructions for the Follow-ups that You Need to Schedule     Discharge Follow-up  with PCP   As directed       Currently Documented PCP:    Provider, No Known    PCP Phone Number:    None     Follow Up Details: GERD, PRN         Discharge Follow-up with Specified Provider: [facility]; 2 Days   As directed      To: [facility]    Follow Up: 2 Days         Discharge Follow-up with Specified Provider: Natal[facility]; 2 Weeks   As directed      To: [facility]    Follow Up: 2 Weeks               Time spent on Discharge including face to face service: 25 minutes    Electronically signed by Dillan Breaux MD, 06/08/21, 9:38 AM EDT.

## 2021-06-08 NOTE — PLAN OF CARE
Goal Outcome Evaluation:  Plan of Care Reviewed With: patient  Patient Agreement with Plan of Care: agrees   Pt is agreeable to treatment plan at this time and is eager for discharge. Pt is accepting of teaching and denies having any anxiety, depression, hopelessness, and rates his positive coping skills 8/10 (high.) Pt is able to name some positive coping skills for himself.  Intent is to discharge pt today. Will continue to monitor until DC.

## 2021-06-08 NOTE — DISCHARGE INSTR - APPOINTMENTS
It is recommended that you participate in programs that promote sobriety.  AA meetings are a free and available resource for you.     Natali Pandya   [address] [place]  [phone]  Appt Thursday 6-10-21 arrive at 9:30am

## 2022-02-01 ENCOUNTER — HOSPITAL ENCOUNTER (EMERGENCY)
Facility: HOSPITAL | Age: 47
Discharge: ANOTHER HOSPITAL | End: 2022-02-02
Attending: EMERGENCY MEDICINE | Admitting: EMERGENCY MEDICINE

## 2022-02-01 DIAGNOSIS — R45.851 SUICIDAL IDEATION: Primary | ICD-10-CM

## 2022-02-01 DIAGNOSIS — F10.20 ALCOHOLISM: ICD-10-CM

## 2022-02-01 LAB
ALBUMIN SERPL-MCNC: 4.7 G/DL (ref 3.5–5.2)
ALBUMIN/GLOB SERPL: 1.3 G/DL
ALP SERPL-CCNC: 120 U/L (ref 39–117)
ALT SERPL W P-5'-P-CCNC: 158 U/L (ref 1–41)
AMPHET+METHAMPHET UR QL: NEGATIVE
ANION GAP SERPL CALCULATED.3IONS-SCNC: 17 MMOL/L (ref 5–15)
APAP SERPL-MCNC: <5 MCG/ML (ref 0–30)
AST SERPL-CCNC: 176 U/L (ref 1–40)
BARBITURATES UR QL SCN: NEGATIVE
BASOPHILS # BLD AUTO: 0.05 10*3/MM3 (ref 0–0.2)
BASOPHILS NFR BLD AUTO: 0.5 % (ref 0–1.5)
BENZODIAZ UR QL SCN: NEGATIVE
BILIRUB SERPL-MCNC: 0.5 MG/DL (ref 0–1.2)
BUN SERPL-MCNC: 4 MG/DL (ref 6–20)
BUN/CREAT SERPL: 3.8 (ref 7–25)
CALCIUM SPEC-SCNC: 9.3 MG/DL (ref 8.6–10.5)
CANNABINOIDS SERPL QL: POSITIVE
CHLORIDE SERPL-SCNC: 97 MMOL/L (ref 98–107)
CO2 SERPL-SCNC: 22 MMOL/L (ref 22–29)
COCAINE UR QL: NEGATIVE
CREAT SERPL-MCNC: 1.06 MG/DL (ref 0.76–1.27)
DEPRECATED RDW RBC AUTO: 53.1 FL (ref 37–54)
EOSINOPHIL # BLD AUTO: 0.02 10*3/MM3 (ref 0–0.4)
EOSINOPHIL NFR BLD AUTO: 0.2 % (ref 0.3–6.2)
ERYTHROCYTE [DISTWIDTH] IN BLOOD BY AUTOMATED COUNT: 18.1 % (ref 12.3–15.4)
ETHANOL BLD-MCNC: 284 MG/DL (ref 0–10)
ETHANOL UR QL: 0.28 %
GFR SERPL CREATININE-BSD FRML MDRD: 75 ML/MIN/1.73
GLOBULIN UR ELPH-MCNC: 3.7 GM/DL
GLUCOSE SERPL-MCNC: 133 MG/DL (ref 65–99)
HCT VFR BLD AUTO: 48.6 % (ref 37.5–51)
HGB BLD-MCNC: 17.9 G/DL (ref 13–17.7)
IMM GRANULOCYTES # BLD AUTO: 0.03 10*3/MM3 (ref 0–0.05)
IMM GRANULOCYTES NFR BLD AUTO: 0.3 % (ref 0–0.5)
LIPASE SERPL-CCNC: 17 U/L (ref 13–60)
LYMPHOCYTES # BLD AUTO: 4.05 10*3/MM3 (ref 0.7–3.1)
LYMPHOCYTES NFR BLD AUTO: 39.4 % (ref 19.6–45.3)
MCH RBC QN AUTO: 31.7 PG (ref 26.6–33)
MCHC RBC AUTO-ENTMCNC: 36.8 G/DL (ref 31.5–35.7)
MCV RBC AUTO: 86 FL (ref 79–97)
METHADONE UR QL SCN: NEGATIVE
MONOCYTES # BLD AUTO: 0.69 10*3/MM3 (ref 0.1–0.9)
MONOCYTES NFR BLD AUTO: 6.7 % (ref 5–12)
NEUTROPHILS NFR BLD AUTO: 5.44 10*3/MM3 (ref 1.7–7)
NEUTROPHILS NFR BLD AUTO: 52.9 % (ref 42.7–76)
NRBC BLD AUTO-RTO: 0 /100 WBC (ref 0–0.2)
OPIATES UR QL: NEGATIVE
OXYCODONE UR QL SCN: NEGATIVE
PLATELET # BLD AUTO: 266 10*3/MM3 (ref 140–450)
PMV BLD AUTO: 8.1 FL (ref 6–12)
POTASSIUM SERPL-SCNC: 3.8 MMOL/L (ref 3.5–5.2)
PROT SERPL-MCNC: 8.4 G/DL (ref 6–8.5)
RBC # BLD AUTO: 5.65 10*6/MM3 (ref 4.14–5.8)
SALICYLATES SERPL-MCNC: <0.3 MG/DL
SARS-COV-2 RNA RESP QL NAA+PROBE: NOT DETECTED
SODIUM SERPL-SCNC: 136 MMOL/L (ref 136–145)
WBC NRBC COR # BLD: 10.28 10*3/MM3 (ref 3.4–10.8)

## 2022-02-01 PROCEDURE — 25010000002 LORAZEPAM PER 2 MG: Performed by: EMERGENCY MEDICINE

## 2022-02-01 PROCEDURE — 85025 COMPLETE CBC W/AUTO DIFF WBC: CPT | Performed by: EMERGENCY MEDICINE

## 2022-02-01 PROCEDURE — 80307 DRUG TEST PRSMV CHEM ANLYZR: CPT | Performed by: EMERGENCY MEDICINE

## 2022-02-01 PROCEDURE — 96375 TX/PRO/DX INJ NEW DRUG ADDON: CPT

## 2022-02-01 PROCEDURE — U0003 INFECTIOUS AGENT DETECTION BY NUCLEIC ACID (DNA OR RNA); SEVERE ACUTE RESPIRATORY SYNDROME CORONAVIRUS 2 (SARS-COV-2) (CORONAVIRUS DISEASE [COVID-19]), AMPLIFIED PROBE TECHNIQUE, MAKING USE OF HIGH THROUGHPUT TECHNOLOGIES AS DESCRIBED BY CMS-2020-01-R: HCPCS | Performed by: EMERGENCY MEDICINE

## 2022-02-01 PROCEDURE — 83690 ASSAY OF LIPASE: CPT | Performed by: EMERGENCY MEDICINE

## 2022-02-01 PROCEDURE — 25010000002 ONDANSETRON PER 1 MG: Performed by: EMERGENCY MEDICINE

## 2022-02-01 PROCEDURE — 99284 EMERGENCY DEPT VISIT MOD MDM: CPT

## 2022-02-01 PROCEDURE — U0005 INFEC AGEN DETEC AMPLI PROBE: HCPCS | Performed by: EMERGENCY MEDICINE

## 2022-02-01 PROCEDURE — 80053 COMPREHEN METABOLIC PANEL: CPT | Performed by: EMERGENCY MEDICINE

## 2022-02-01 PROCEDURE — 80179 DRUG ASSAY SALICYLATE: CPT | Performed by: EMERGENCY MEDICINE

## 2022-02-01 PROCEDURE — 25010000002 THIAMINE PER 100 MG: Performed by: EMERGENCY MEDICINE

## 2022-02-01 PROCEDURE — 96374 THER/PROPH/DIAG INJ IV PUSH: CPT

## 2022-02-01 PROCEDURE — 82077 ASSAY SPEC XCP UR&BREATH IA: CPT | Performed by: EMERGENCY MEDICINE

## 2022-02-01 PROCEDURE — 96376 TX/PRO/DX INJ SAME DRUG ADON: CPT

## 2022-02-01 PROCEDURE — 80143 DRUG ASSAY ACETAMINOPHEN: CPT | Performed by: EMERGENCY MEDICINE

## 2022-02-01 RX ORDER — LORAZEPAM 2 MG/ML
1 INJECTION INTRAMUSCULAR ONCE
Status: COMPLETED | OUTPATIENT
Start: 2022-02-01 | End: 2022-02-01

## 2022-02-01 RX ORDER — ONDANSETRON 2 MG/ML
4 INJECTION INTRAMUSCULAR; INTRAVENOUS ONCE
Status: COMPLETED | OUTPATIENT
Start: 2022-02-01 | End: 2022-02-01

## 2022-02-01 RX ORDER — THIAMINE HYDROCHLORIDE 100 MG/ML
100 INJECTION, SOLUTION INTRAMUSCULAR; INTRAVENOUS DAILY
Status: DISCONTINUED | OUTPATIENT
Start: 2022-02-01 | End: 2022-02-02 | Stop reason: HOSPADM

## 2022-02-01 RX ADMIN — THIAMINE HYDROCHLORIDE 100 MG: 100 INJECTION, SOLUTION INTRAMUSCULAR; INTRAVENOUS at 18:23

## 2022-02-01 RX ADMIN — LORAZEPAM 1 MG: 2 INJECTION INTRAMUSCULAR; INTRAVENOUS at 18:20

## 2022-02-01 RX ADMIN — ONDANSETRON 4 MG: 2 INJECTION INTRAMUSCULAR; INTRAVENOUS at 19:30

## 2022-02-01 RX ADMIN — SODIUM CHLORIDE 1000 ML: 9 INJECTION, SOLUTION INTRAVENOUS at 18:22

## 2022-02-01 RX ADMIN — LORAZEPAM 1 MG: 2 INJECTION INTRAMUSCULAR; INTRAVENOUS at 23:01

## 2022-02-01 RX ADMIN — LORAZEPAM 1 MG: 2 INJECTION INTRAMUSCULAR; INTRAVENOUS at 19:31

## 2022-02-01 NOTE — ED PROVIDER NOTES
Subjective     Psychiatric Evaluation  Location:  Generalized  Quality:  Anxiety and depression  Severity:  Moderate  Onset quality:  Gradual  Duration:  3 days  Timing:  Constant  Progression:  Waxing and waning  Chronicity:  Recurrent  Context:  Recent death of a parent.  Stress from being an alcoholic.  Relieved by:  Nothing  Worsened by:  Drinking alcohol  Ineffective treatments:  None  Associated symptoms: abdominal pain    Associated symptoms: no chest pain, no diarrhea, no fever, no headaches, no shortness of breath, no vomiting and no wheezing        Review of Systems   Constitutional: Negative for fever.   Respiratory: Negative for shortness of breath and wheezing.    Cardiovascular: Negative for chest pain.   Gastrointestinal: Positive for abdominal pain. Negative for diarrhea and vomiting.   Neurological: Negative for headaches.   All other systems reviewed and are negative.      Past Medical History:   Diagnosis Date   • Alcohol abuse    • Anxiety    • Depression    • PTSD (post-traumatic stress disorder)    • Seizures (HCC)        No Known Allergies    Past Surgical History:   Procedure Laterality Date   • BACK SURGERY         Family History   Problem Relation Age of Onset   • Alcohol abuse Father        Social History     Socioeconomic History   • Marital status:    Tobacco Use   • Smoking status: Current Every Day Smoker     Packs/day: 1.00     Types: Cigarettes   • Smokeless tobacco: Never Used   Vaping Use   • Vaping Use: Never used   Substance and Sexual Activity   • Alcohol use: Yes     Comment: 6-12 beers/daily   • Drug use: Yes     Types: Marijuana           Objective   Physical Exam  Vitals and nursing note reviewed.   Constitutional:       Appearance: He is not ill-appearing.   HENT:      Head: Normocephalic and atraumatic.   Eyes:      General: No scleral icterus.  Cardiovascular:      Rate and Rhythm: Normal rate and regular rhythm.      Heart sounds: Normal heart sounds.    Pulmonary:      Effort: Pulmonary effort is normal.      Breath sounds: Normal breath sounds.   Abdominal:      General: Abdomen is flat.      Palpations: Abdomen is soft.      Tenderness: There is no abdominal tenderness. There is no guarding or rebound.   Skin:     General: Skin is warm and dry.   Neurological:      Mental Status: He is alert and oriented to person, place, and time.   Psychiatric:         Mood and Affect: Mood is anxious and depressed.         Procedures           ED Course                                                 MDM  Number of Diagnoses or Management Options     Amount and/or Complexity of Data Reviewed  Clinical lab tests: reviewed  Decide to obtain previous medical records or to obtain history from someone other than the patient: yes  Review and summarize past medical records: yes  Discuss the patient with other providers: yes      Is a 47-year-old male chronic alcoholic who his mother  3 days ago.  He has been off of his medications and has a psychiatric past medical history.  He presented to [facility] in hopes of detoxing and resuming his medications but he was referred to the emergency department for medical clearance.  At some point he mentioned being suicidal but here in the department he denies this.  The patient smells of alcohol and is mildly tachycardic.  He complains of some mild abdominal cramps but otherwise appears to be in stable condition.  Will provide some supportive care treatments and reevaluate.    The patient is sober.  He is not in severe withdrawal.  He is medically stable for psychiatric evaluation.  [facility] saw the patient and noted that he has good outpatient resources but that he could be transferred back to [facility].    Overnight physician made aware of this patient's potential disposition.  Awaiting bed at this time.  Final diagnoses:   Suicidal ideation   Alcoholism (HCC)       ED Disposition  ED Disposition     ED Disposition Condition  Comment    Transfer to Another Facility   [facility]          No follow-up provider specified.       Medication List      No changes were made to your prescriptions during this visit.          Emre Ely DO  02/02/22 0053       Emre Ely DO  02/02/22 0141

## 2022-02-02 VITALS
DIASTOLIC BLOOD PRESSURE: 95 MMHG | TEMPERATURE: 98.8 F | HEART RATE: 97 BPM | RESPIRATION RATE: 20 BRPM | SYSTOLIC BLOOD PRESSURE: 130 MMHG | OXYGEN SATURATION: 96 % | HEIGHT: 67 IN | WEIGHT: 219.14 LBS | BODY MASS INDEX: 34.39 KG/M2

## 2022-02-02 LAB
ETHANOL BLD-MCNC: 108 MG/DL (ref 0–10)
ETHANOL UR QL: 0.11 %

## 2022-02-02 NOTE — CONSULTS
Adult Nutrition  Assessment    Patient Name:  Tramaine De Leon  YOB: 1975  MRN: [id number]  Admit Date:  6/5/2021    Assessment Date:  6/6/2021        Reason for Assessment     Row Name 06/06/21 1214          Reason for Assessment    Reason For Assessment  identified at risk by screening criteria     Identified At Risk by Screening Criteria  MST SCORE 2+;unintentional loss of 10 lbs or more in the past 2 mos                 Estimated/Assessed Needs     Row Name 06/06/21 1215          Calculation Measurements    Weight Used For Calculations  61 kg (134 lb 7.7 oz) based on IBW 2' to BMI 36.95        Estimated/Assessed Needs    Additional Documentation  KCAL/KG (Group);Protein Requirements (Group)        KCAL/KG    KCAL/KG  30 Kcal/Kg (kcal)     30 Kcal/Kg (kcal)  1830        Protein Requirements    Weight Used For Protein Calculations  61 kg (134 lb 7.7 oz)     Est Protein Requirement Amount (gms/kg)  1.0 gm protein     Estimated Protein Requirements (gms/day)  61         Nutrition Prescription Ordered     Row Name 06/06/21 1215          Nutrition Prescription PO    Current PO Diet  Regular         Assessment:  RD trigger for unintentional wt loss, however pt BMI 36.85.  Intervention:  Current regular diet will be adequate in calories & protein to meet estimated nutritional needs.        Electronically signed by:  Brittny Moses RD  06/06/21 12:18 EDT   same name as above

## 2022-02-02 NOTE — ED NOTES
Contacted [facility] per Dr. Ely orders. Davie amezquita stated to fax referral, they will review, and then contact their admitting doctor and let us know. Referral faxed. Patient and Dr. Ely made aware.      Amy Vo, RN  02/02/22 0111

## 2022-02-02 NOTE — ED NOTES
One on one continuous, uninterrupted observation continues with sitter at bedside. Room safe and secured.        Ely Starr RN  02/01/22 2457

## 2022-02-02 NOTE — ED NOTES
One on one continuous, uninterrupted observation continues with sitter at bedside. Room safe and secured.        Ely Starr RN  02/01/22 4525

## 2022-03-17 ENCOUNTER — HOSPITAL ENCOUNTER (EMERGENCY)
Facility: HOSPITAL | Age: 47
Discharge: HOME | End: 2022-03-17
Attending: EMERGENCY MEDICINE | Admitting: EMERGENCY MEDICINE

## 2022-03-17 VITALS
HEIGHT: 67 IN | OXYGEN SATURATION: 100 % | BODY MASS INDEX: 36.54 KG/M2 | DIASTOLIC BLOOD PRESSURE: 89 MMHG | HEART RATE: 88 BPM | SYSTOLIC BLOOD PRESSURE: 117 MMHG | TEMPERATURE: 98 F | RESPIRATION RATE: 18 BRPM | WEIGHT: 232.81 LBS

## 2022-03-17 DIAGNOSIS — K08.89 PAIN, DENTAL: Primary | ICD-10-CM

## 2022-03-17 DIAGNOSIS — K04.7 DENTAL INFECTION: ICD-10-CM

## 2022-03-17 PROCEDURE — 96372 THER/PROPH/DIAG INJ SC/IM: CPT

## 2022-03-17 PROCEDURE — 25010000002 CEFTRIAXONE PER 250 MG: Performed by: NURSE PRACTITIONER

## 2022-03-17 PROCEDURE — 99282 EMERGENCY DEPT VISIT SF MDM: CPT

## 2022-03-17 PROCEDURE — 25010000002 KETOROLAC TROMETHAMINE PER 15 MG: Performed by: NURSE PRACTITIONER

## 2022-03-17 RX ORDER — IBUPROFEN 800 MG/1
800 TABLET ORAL EVERY 6 HOURS PRN
COMMUNITY

## 2022-03-17 RX ORDER — BENZTROPINE MESYLATE 1 MG/1
1 TABLET ORAL 2 TIMES DAILY
COMMUNITY

## 2022-03-17 RX ORDER — KETOROLAC TROMETHAMINE 30 MG/ML
60 INJECTION, SOLUTION INTRAMUSCULAR; INTRAVENOUS ONCE
Status: COMPLETED | OUTPATIENT
Start: 2022-03-17 | End: 2022-03-17

## 2022-03-17 RX ORDER — CHLORDIAZEPOXIDE HYDROCHLORIDE 25 MG/1
25 CAPSULE, GELATIN COATED ORAL 3 TIMES DAILY PRN
COMMUNITY

## 2022-03-17 RX ORDER — THIAMINE HCL 50 MG
100 TABLET ORAL DAILY
COMMUNITY

## 2022-03-17 RX ORDER — AMOXICILLIN 875 MG/1
875 TABLET, COATED ORAL 2 TIMES DAILY
COMMUNITY

## 2022-03-17 RX ADMIN — KETOROLAC TROMETHAMINE 60 MG: 60 INJECTION, SOLUTION INTRAMUSCULAR at 09:40

## 2022-03-17 RX ADMIN — LIDOCAINE HYDROCHLORIDE 1 G: 10 INJECTION, SOLUTION EPIDURAL; INFILTRATION; INTRACAUDAL; PERINEURAL at 09:39

## 2022-03-17 NOTE — ED NOTES
Patient advises that he wants a shot for his abscess because he wants it to work quicker. He has been on amoxicillin since yesterday.

## 2022-03-17 NOTE — ED PROVIDER NOTES
Subjective   Tramaine 47-year-old male that presents emergency department today for complaints of facial swelling and dental pain.  He states that he was started on an antibiotic for this dental infection but is only had 3 antibiotic tablets thus far.  He rates his pain today 4 out of 10 and denies any fevers associated with this or any other complaints.          Review of Systems   HENT: Positive for dental problem and facial swelling.    All other systems reviewed and are negative.      Past Medical History:   Diagnosis Date   • Alcohol abuse    • Anxiety    • Depression    • PTSD (post-traumatic stress disorder)    • Seizures (HCC)        No Known Allergies    Past Surgical History:   Procedure Laterality Date   • BACK SURGERY         Family History   Problem Relation Age of Onset   • Alcohol abuse Father        Social History     Socioeconomic History   • Marital status:    Tobacco Use   • Smoking status: Current Every Day Smoker     Packs/day: 1.00     Types: Cigarettes   • Smokeless tobacco: Never Used   Vaping Use   • Vaping Use: Never used   Substance and Sexual Activity   • Alcohol use: Yes     Comment: 6-12 beers/daily   • Drug use: Yes     Types: Marijuana           Objective   Physical Exam  Vitals and nursing note reviewed.   Constitutional:       General: He is not in acute distress.     Appearance: Normal appearance. He is normal weight. He is not ill-appearing, toxic-appearing or diaphoretic.   HENT:      Head: Normocephalic and atraumatic.      Mouth/Throat:      Mouth: Mucous membranes are moist.      Comments: Maxillary frontal gum swelling, erythema, tenderness, without abscess, multiple dental caries.  Cardiovascular:      Rate and Rhythm: Normal rate and regular rhythm.      Pulses: Normal pulses.      Heart sounds: Normal heart sounds.   Pulmonary:      Effort: Pulmonary effort is normal. No respiratory distress.      Breath sounds: Normal breath sounds.   Abdominal:      General: Abdomen  is flat.      Palpations: Abdomen is soft.      Tenderness: There is no abdominal tenderness.   Musculoskeletal:         General: Normal range of motion.      Cervical back: Normal range of motion and neck supple.   Skin:     General: Skin is warm and dry.   Neurological:      Mental Status: He is alert and oriented to person, place, and time. Mental status is at baseline.         Procedures           ED Course                                                 MDM  Number of Diagnoses or Management Options  Diagnosis management comments: Vitals stable, no acute distress, afebrile.  Told patient to continue taking his p.o. antibiotics and follow-up with dentist as scheduled.  Educated him on worrisome symptoms to return for and he verbalized understanding.    Risk of Complications, Morbidity, and/or Mortality  Presenting problems: low  Diagnostic procedures: low  Management options: low    Patient Progress  Patient progress: stable      Final diagnoses:   Pain, dental   Dental infection       ED Disposition  ED Disposition     ED Disposition   Discharge    Condition   Stable    Comment   --             dentist  asap  Schedule an appointment as soon as possible for a visit            Medication List      Changed    prazosin 2 MG capsule  Commonly known as: Minipress  Take 2 capsules by mouth Every Night. Indications: Frightening Dreams  What changed: how much to take     traZODone 50 MG tablet  Commonly known as: DESYREL  Take 2 tablets by mouth At Night As Needed for Sleep (insomnia). Indications: Trouble Sleeping  What changed: how much to take             Lisa Cornelius APRN  03/17/22 0923       Lisa Cornelius APRN  03/17/22 0924

## 2022-03-26 ENCOUNTER — HOSPITAL ENCOUNTER (EMERGENCY)
Facility: HOSPITAL | Age: 47
Discharge: HOME | End: 2022-03-26
Attending: EMERGENCY MEDICINE | Admitting: EMERGENCY MEDICINE

## 2022-03-26 ENCOUNTER — APPOINTMENT (OUTPATIENT)
Dept: GENERAL RADIOLOGY | Facility: HOSPITAL | Age: 47
End: 2022-03-26

## 2022-03-26 VITALS
HEIGHT: 67 IN | OXYGEN SATURATION: 100 % | DIASTOLIC BLOOD PRESSURE: 81 MMHG | RESPIRATION RATE: 24 BRPM | TEMPERATURE: 98.4 F | HEART RATE: 95 BPM | SYSTOLIC BLOOD PRESSURE: 130 MMHG | BODY MASS INDEX: 36.46 KG/M2

## 2022-03-26 DIAGNOSIS — S46.911A STRAIN OF RIGHT SHOULDER, INITIAL ENCOUNTER: Primary | ICD-10-CM

## 2022-03-26 DIAGNOSIS — F10.929 ALCOHOLIC INTOXICATION WITH COMPLICATION: ICD-10-CM

## 2022-03-26 LAB
ALBUMIN SERPL-MCNC: 5 G/DL (ref 3.5–5.2)
ALBUMIN/GLOB SERPL: 1.3 G/DL
ALP SERPL-CCNC: 104 U/L (ref 39–117)
ALT SERPL W P-5'-P-CCNC: 55 U/L (ref 1–41)
ANION GAP SERPL CALCULATED.3IONS-SCNC: 23.1 MMOL/L (ref 5–15)
AST SERPL-CCNC: 38 U/L (ref 1–40)
BASOPHILS # BLD AUTO: 0.09 10*3/MM3 (ref 0–0.2)
BASOPHILS NFR BLD AUTO: 0.7 % (ref 0–1.5)
BILIRUB SERPL-MCNC: 0.3 MG/DL (ref 0–1.2)
BUN SERPL-MCNC: 9 MG/DL (ref 6–20)
BUN/CREAT SERPL: 8 (ref 7–25)
CALCIUM SPEC-SCNC: 9.6 MG/DL (ref 8.6–10.5)
CHLORIDE SERPL-SCNC: 96 MMOL/L (ref 98–107)
CO2 SERPL-SCNC: 17.9 MMOL/L (ref 22–29)
CREAT SERPL-MCNC: 1.12 MG/DL (ref 0.76–1.27)
DEPRECATED RDW RBC AUTO: 48.4 FL (ref 37–54)
EGFRCR SERPLBLD CKD-EPI 2021: 81.5 ML/MIN/1.73
EOSINOPHIL # BLD AUTO: 0.02 10*3/MM3 (ref 0–0.4)
EOSINOPHIL NFR BLD AUTO: 0.2 % (ref 0.3–6.2)
ERYTHROCYTE [DISTWIDTH] IN BLOOD BY AUTOMATED COUNT: 15 % (ref 12.3–15.4)
ETHANOL BLD-MCNC: 191 MG/DL (ref 0–10)
ETHANOL BLD-MCNC: 303 MG/DL (ref 0–10)
ETHANOL UR QL: 0.19 %
ETHANOL UR QL: 0.3 %
GLOBULIN UR ELPH-MCNC: 3.9 GM/DL
GLUCOSE SERPL-MCNC: 125 MG/DL (ref 65–99)
HCT VFR BLD AUTO: 49 % (ref 37.5–51)
HGB BLD-MCNC: 17.8 G/DL (ref 13–17.7)
IMM GRANULOCYTES # BLD AUTO: 0.06 10*3/MM3 (ref 0–0.05)
IMM GRANULOCYTES NFR BLD AUTO: 0.5 % (ref 0–0.5)
LYMPHOCYTES # BLD AUTO: 4.31 10*3/MM3 (ref 0.7–3.1)
LYMPHOCYTES NFR BLD AUTO: 33.1 % (ref 19.6–45.3)
MCH RBC QN AUTO: 32 PG (ref 26.6–33)
MCHC RBC AUTO-ENTMCNC: 36.3 G/DL (ref 31.5–35.7)
MCV RBC AUTO: 88 FL (ref 79–97)
MONOCYTES # BLD AUTO: 0.67 10*3/MM3 (ref 0.1–0.9)
MONOCYTES NFR BLD AUTO: 5.1 % (ref 5–12)
NEUTROPHILS NFR BLD AUTO: 60.4 % (ref 42.7–76)
NEUTROPHILS NFR BLD AUTO: 7.87 10*3/MM3 (ref 1.7–7)
NRBC BLD AUTO-RTO: 0 /100 WBC (ref 0–0.2)
PLATELET # BLD AUTO: 484 10*3/MM3 (ref 140–450)
PMV BLD AUTO: 9 FL (ref 6–12)
POTASSIUM SERPL-SCNC: 4.1 MMOL/L (ref 3.5–5.2)
PROT SERPL-MCNC: 8.9 G/DL (ref 6–8.5)
RBC # BLD AUTO: 5.57 10*6/MM3 (ref 4.14–5.8)
SODIUM SERPL-SCNC: 137 MMOL/L (ref 136–145)
WBC NRBC COR # BLD: 13.02 10*3/MM3 (ref 3.4–10.8)

## 2022-03-26 PROCEDURE — 36415 COLL VENOUS BLD VENIPUNCTURE: CPT

## 2022-03-26 PROCEDURE — 96361 HYDRATE IV INFUSION ADD-ON: CPT

## 2022-03-26 PROCEDURE — 85025 COMPLETE CBC W/AUTO DIFF WBC: CPT | Performed by: EMERGENCY MEDICINE

## 2022-03-26 PROCEDURE — 82077 ASSAY SPEC XCP UR&BREATH IA: CPT | Performed by: NURSE PRACTITIONER

## 2022-03-26 PROCEDURE — 96374 THER/PROPH/DIAG INJ IV PUSH: CPT

## 2022-03-26 PROCEDURE — 99283 EMERGENCY DEPT VISIT LOW MDM: CPT

## 2022-03-26 PROCEDURE — 36415 COLL VENOUS BLD VENIPUNCTURE: CPT | Performed by: EMERGENCY MEDICINE

## 2022-03-26 PROCEDURE — 25010000002 KETOROLAC TROMETHAMINE PER 15 MG: Performed by: NURSE PRACTITIONER

## 2022-03-26 PROCEDURE — 80053 COMPREHEN METABOLIC PANEL: CPT | Performed by: EMERGENCY MEDICINE

## 2022-03-26 PROCEDURE — 73030 X-RAY EXAM OF SHOULDER: CPT

## 2022-03-26 PROCEDURE — 82077 ASSAY SPEC XCP UR&BREATH IA: CPT | Performed by: EMERGENCY MEDICINE

## 2022-03-26 RX ORDER — KETOROLAC TROMETHAMINE 30 MG/ML
60 INJECTION, SOLUTION INTRAMUSCULAR; INTRAVENOUS ONCE
Status: DISCONTINUED | OUTPATIENT
Start: 2022-03-26 | End: 2022-03-26

## 2022-03-26 RX ORDER — KETOROLAC TROMETHAMINE 30 MG/ML
30 INJECTION, SOLUTION INTRAMUSCULAR; INTRAVENOUS ONCE
Status: COMPLETED | OUTPATIENT
Start: 2022-03-26 | End: 2022-03-26

## 2022-03-26 RX ADMIN — SODIUM CHLORIDE 1000 ML: 9 INJECTION, SOLUTION INTRAVENOUS at 21:07

## 2022-03-26 RX ADMIN — KETOROLAC TROMETHAMINE 30 MG: 30 INJECTION, SOLUTION INTRAMUSCULAR; INTRAVENOUS at 21:07

## 2022-03-27 NOTE — ED PROVIDER NOTES
Patrica Redd is a 47-year-old male that presents emergency department today for complaints of right shoulder pain after falling.  He states that he had to drink 3 packs of beer that caused him to be unsteady and fall tonight.  He denies any other injuries today.  He rates his right shoulder pain a 9 out of 10 worse with movement.  He denies any SI or HI and states that he is currently in a recovery house trying to get help for his alcoholism.          Review of Systems   Musculoskeletal: Positive for arthralgias.   All other systems reviewed and are negative.      Past Medical History:   Diagnosis Date   • Alcohol abuse    • Anxiety    • Depression    • PTSD (post-traumatic stress disorder)    • Seizures (HCC)        No Known Allergies    Past Surgical History:   Procedure Laterality Date   • BACK SURGERY         Family History   Problem Relation Age of Onset   • Alcohol abuse Father        Social History     Socioeconomic History   • Marital status:    Tobacco Use   • Smoking status: Current Every Day Smoker     Packs/day: 1.00     Types: Cigarettes   • Smokeless tobacco: Never Used   Vaping Use   • Vaping Use: Never used   Substance and Sexual Activity   • Alcohol use: Yes     Comment: 6-12 beers/daily   • Drug use: Yes     Types: Marijuana           Objective   Physical Exam  Vitals and nursing note reviewed.   Constitutional:       General: He is not in acute distress.     Appearance: Normal appearance. He is not ill-appearing, toxic-appearing or diaphoretic.   HENT:      Head: Normocephalic and atraumatic.      Mouth/Throat:      Mouth: Mucous membranes are moist.   Eyes:      General: No scleral icterus.     Extraocular Movements: Extraocular movements intact.      Pupils: Pupils are equal, round, and reactive to light.   Cardiovascular:      Rate and Rhythm: Normal rate and regular rhythm.      Pulses: Normal pulses.      Heart sounds: Normal heart sounds.   Pulmonary:      Effort: Pulmonary  effort is normal. No respiratory distress.      Breath sounds: Normal breath sounds.   Abdominal:      General: Abdomen is flat.      Palpations: Abdomen is soft.      Tenderness: There is no abdominal tenderness.   Musculoskeletal:         General: Tenderness and signs of injury present. No swelling or deformity.      Cervical back: Normal range of motion and neck supple.      Right lower leg: No edema.      Left lower leg: No edema.      Comments: Decreased ROM to right shoulder   Skin:     General: Skin is warm and dry.      Capillary Refill: Capillary refill takes less than 2 seconds.   Neurological:      General: No focal deficit present.      Mental Status: He is alert and oriented to person, place, and time. Mental status is at baseline.      Cranial Nerves: No cranial nerve deficit.      Sensory: No sensory deficit.      Motor: No weakness.      Coordination: Coordination normal.      Gait: Gait normal.   Psychiatric:         Mood and Affect: Mood normal.         Behavior: Behavior normal.         Thought Content: Thought content normal.         Judgment: Judgment normal.         Procedures           ED Course                                                 MDM  Number of Diagnoses or Management Options  Alcoholic intoxication with complication (HCC)  Strain of right shoulder, initial encounter  Diagnosis management comments: Seen and assessed patient as noted.  Vital stable, no acute distress, afebrile.    X-ray of right shoulder shows no acute findings.  Patient's alcohol level is greater than 300 still infusing IV fluids to try to lower this.  Although his alcohol level is so high he is mentating within normal limits and is neurologically intact with sensation intact and no focal deficits.    ETOH is 191 now and he is still ambulating without complication, mentating without complication.  He has a  at his bedside now to drive him home and I feel he is safe to discharge home at this time.  Educated  him on worrisome symptoms to return for and he verbalized understanding.           Amount and/or Complexity of Data Reviewed  Clinical lab tests: reviewed and ordered  Tests in the radiology section of CPT®: reviewed and ordered  Decide to obtain previous medical records or to obtain history from someone other than the patient: yes    Risk of Complications, Morbidity, and/or Mortality  Presenting problems: moderate  Diagnostic procedures: moderate  Management options: moderate    Patient Progress  Patient progress: stable      Final diagnoses:   Strain of right shoulder, initial encounter   Alcoholic intoxication with complication (HCC)       ED Disposition  ED Disposition     ED Disposition   Discharge    Condition   Stable    Comment   --             [facility] EMERGENCY ROOM  [id number]  [id number] [phone]  Go to   If symptoms worsen         Medication List      Changed    prazosin 2 MG capsule  Commonly known as: Minipress  Take 2 capsules by mouth Every Night. Indications: Frightening Dreams  What changed: how much to take     traZODone 50 MG tablet  Commonly known as: DESYREL  Take 2 tablets by mouth At Night As Needed for Sleep (insomnia). Indications: Trouble Sleeping  What changed: how much to take             Sharp, Lisa [place], APRN  03/26/22 2207       Sharp, Lisa [place], APRN  03/26/22 2244       Sharp, Lisa Maira, APRN  03/26/22 2259

## 2022-04-21 ENCOUNTER — HOSPITAL ENCOUNTER (EMERGENCY)
Facility: HOSPITAL | Age: 47
Discharge: ANOTHER HOSPITAL | End: 2022-04-22
Attending: EMERGENCY MEDICINE | Admitting: EMERGENCY MEDICINE

## 2022-04-21 DIAGNOSIS — F10.930 ALCOHOL WITHDRAWAL SYNDROME WITHOUT COMPLICATION: ICD-10-CM

## 2022-04-21 DIAGNOSIS — F10.220 ALCOHOL DEPENDENCE WITH UNCOMPLICATED INTOXICATION: Primary | ICD-10-CM

## 2022-04-21 LAB
ALBUMIN SERPL-MCNC: 4.9 G/DL (ref 3.5–5.2)
ALBUMIN/GLOB SERPL: 1.5 G/DL
ALP SERPL-CCNC: 79 U/L (ref 39–117)
ALT SERPL W P-5'-P-CCNC: 62 U/L (ref 1–41)
AMPHET+METHAMPHET UR QL: NEGATIVE
ANION GAP SERPL CALCULATED.3IONS-SCNC: 18.8 MMOL/L (ref 5–15)
APAP SERPL-MCNC: <5 MCG/ML (ref 0–30)
AST SERPL-CCNC: 62 U/L (ref 1–40)
BARBITURATES UR QL SCN: NEGATIVE
BASOPHILS # BLD AUTO: 0.04 10*3/MM3 (ref 0–0.2)
BASOPHILS NFR BLD AUTO: 0.4 % (ref 0–1.5)
BENZODIAZ UR QL SCN: NEGATIVE
BILIRUB SERPL-MCNC: 0.4 MG/DL (ref 0–1.2)
BUN SERPL-MCNC: 5 MG/DL (ref 6–20)
BUN/CREAT SERPL: 5.3 (ref 7–25)
CALCIUM SPEC-SCNC: 9.4 MG/DL (ref 8.6–10.5)
CANNABINOIDS SERPL QL: POSITIVE
CHLORIDE SERPL-SCNC: 99 MMOL/L (ref 98–107)
CO2 SERPL-SCNC: 22.2 MMOL/L (ref 22–29)
COCAINE UR QL: NEGATIVE
CREAT SERPL-MCNC: 0.94 MG/DL (ref 0.76–1.27)
DEPRECATED RDW RBC AUTO: 46.5 FL (ref 37–54)
EGFRCR SERPLBLD CKD-EPI 2021: 100.6 ML/MIN/1.73
EOSINOPHIL # BLD AUTO: 0.02 10*3/MM3 (ref 0–0.4)
EOSINOPHIL NFR BLD AUTO: 0.2 % (ref 0.3–6.2)
ERYTHROCYTE [DISTWIDTH] IN BLOOD BY AUTOMATED COUNT: 14.3 % (ref 12.3–15.4)
ETHANOL BLD-MCNC: 366 MG/DL (ref 0–10)
ETHANOL UR QL: 0.37 %
GLOBULIN UR ELPH-MCNC: 3.3 GM/DL
GLUCOSE SERPL-MCNC: 117 MG/DL (ref 65–99)
HCT VFR BLD AUTO: 43.8 % (ref 37.5–51)
HGB BLD-MCNC: 15.6 G/DL (ref 13–17.7)
HOLD SPECIMEN: NORMAL
HOLD SPECIMEN: NORMAL
IMM GRANULOCYTES # BLD AUTO: 0.03 10*3/MM3 (ref 0–0.05)
IMM GRANULOCYTES NFR BLD AUTO: 0.3 % (ref 0–0.5)
LIPASE SERPL-CCNC: 30 U/L (ref 13–60)
LYMPHOCYTES # BLD AUTO: 3.27 10*3/MM3 (ref 0.7–3.1)
LYMPHOCYTES NFR BLD AUTO: 33.7 % (ref 19.6–45.3)
MCH RBC QN AUTO: 31.7 PG (ref 26.6–33)
MCHC RBC AUTO-ENTMCNC: 35.6 G/DL (ref 31.5–35.7)
MCV RBC AUTO: 89 FL (ref 79–97)
METHADONE UR QL SCN: NEGATIVE
MONOCYTES # BLD AUTO: 0.43 10*3/MM3 (ref 0.1–0.9)
MONOCYTES NFR BLD AUTO: 4.4 % (ref 5–12)
NEUTROPHILS NFR BLD AUTO: 5.91 10*3/MM3 (ref 1.7–7)
NEUTROPHILS NFR BLD AUTO: 61 % (ref 42.7–76)
NRBC BLD AUTO-RTO: 0 /100 WBC (ref 0–0.2)
OPIATES UR QL: NEGATIVE
OXYCODONE UR QL SCN: NEGATIVE
PLATELET # BLD AUTO: 345 10*3/MM3 (ref 140–450)
PMV BLD AUTO: 8.9 FL (ref 6–12)
POTASSIUM SERPL-SCNC: 4.1 MMOL/L (ref 3.5–5.2)
PROT SERPL-MCNC: 8.2 G/DL (ref 6–8.5)
RBC # BLD AUTO: 4.92 10*6/MM3 (ref 4.14–5.8)
SALICYLATES SERPL-MCNC: <0.3 MG/DL
SODIUM SERPL-SCNC: 140 MMOL/L (ref 136–145)
WBC NRBC COR # BLD: 9.7 10*3/MM3 (ref 3.4–10.8)
WHOLE BLOOD HOLD SPECIMEN: NORMAL
WHOLE BLOOD HOLD SPECIMEN: NORMAL

## 2022-04-21 PROCEDURE — 80143 DRUG ASSAY ACETAMINOPHEN: CPT | Performed by: EMERGENCY MEDICINE

## 2022-04-21 PROCEDURE — 83690 ASSAY OF LIPASE: CPT | Performed by: EMERGENCY MEDICINE

## 2022-04-21 PROCEDURE — 80307 DRUG TEST PRSMV CHEM ANLYZR: CPT | Performed by: EMERGENCY MEDICINE

## 2022-04-21 PROCEDURE — 25010000002 LORAZEPAM PER 2 MG: Performed by: EMERGENCY MEDICINE

## 2022-04-21 PROCEDURE — 99284 EMERGENCY DEPT VISIT MOD MDM: CPT

## 2022-04-21 PROCEDURE — 80053 COMPREHEN METABOLIC PANEL: CPT | Performed by: EMERGENCY MEDICINE

## 2022-04-21 PROCEDURE — 36415 COLL VENOUS BLD VENIPUNCTURE: CPT

## 2022-04-21 PROCEDURE — 80179 DRUG ASSAY SALICYLATE: CPT | Performed by: EMERGENCY MEDICINE

## 2022-04-21 PROCEDURE — 85025 COMPLETE CBC W/AUTO DIFF WBC: CPT | Performed by: EMERGENCY MEDICINE

## 2022-04-21 PROCEDURE — 96374 THER/PROPH/DIAG INJ IV PUSH: CPT

## 2022-04-21 PROCEDURE — 82077 ASSAY SPEC XCP UR&BREATH IA: CPT | Performed by: EMERGENCY MEDICINE

## 2022-04-21 PROCEDURE — 96372 THER/PROPH/DIAG INJ SC/IM: CPT

## 2022-04-21 RX ORDER — NICOTINE 21 MG/24HR
1 PATCH, TRANSDERMAL 24 HOURS TRANSDERMAL
Status: DISCONTINUED | OUTPATIENT
Start: 2022-04-21 | End: 2022-04-22 | Stop reason: HOSPADM

## 2022-04-21 RX ORDER — LORAZEPAM 2 MG/ML
1 INJECTION INTRAMUSCULAR ONCE
Status: COMPLETED | OUTPATIENT
Start: 2022-04-21 | End: 2022-04-21

## 2022-04-21 RX ORDER — SODIUM CHLORIDE 0.9 % (FLUSH) 0.9 %
10 SYRINGE (ML) INJECTION AS NEEDED
Status: DISCONTINUED | OUTPATIENT
Start: 2022-04-21 | End: 2022-04-22 | Stop reason: HOSPADM

## 2022-04-21 RX ADMIN — LORAZEPAM 1 MG: 2 INJECTION INTRAMUSCULAR; INTRAVENOUS at 21:45

## 2022-04-21 RX ADMIN — LORAZEPAM 1 MG: 2 INJECTION INTRAMUSCULAR; INTRAVENOUS at 18:29

## 2022-04-21 RX ADMIN — NICOTINE 1 PATCH: 21 PATCH, EXTENDED RELEASE TRANSDERMAL at 18:29

## 2022-04-21 RX ADMIN — SODIUM CHLORIDE 1000 ML: 9 INJECTION, SOLUTION INTRAVENOUS at 21:44

## 2022-04-21 NOTE — ED PROVIDER NOTES
Time: 1811  Arrived by: Private vehicle  Chief Complaint: Alcohol intoxication, wants detox  History provided by: Patient    History of Present Illness:  Patient is a 47 y.o. year old male that presents to the emergency department with history of alcohol abuse and history of previous withdrawal seizures now presenting with alcohol intoxication earlier today and has been referred here by [facility] facility for medical clearance.    He is conversational and appears stable here and states he just wants to detox from alcohol, and denies any suicidal or homicidal thoughts.    He denies any recent illnesses such as fevers or chills or vomiting or diarrhea or chest pain or dyspnea or dysuria.    He does occasionally get some upper abdominal pain.        Similar Symptoms Previously: Yes  Recently seen: Yes, seen at [facility] facility earlier today.      Patient Care Team  Primary Care Provider: Unknown    Past Medical History:   Alcohol abuse, anxiety, PTSD, seizures      Social History     Socioeconomic History   • Marital status:    Tobacco Use   • Smoking status: Current Every Day Smoker     Packs/day: 1.00     Types: Cigarettes   • Smokeless tobacco: Never Used   Vaping Use   • Vaping Use: Never used   Substance and Sexual Activity   • Alcohol use: Yes     Comment: 6-12 beers/daily   • Drug use: Yes     Types: Marijuana         No Known Allergies  Past Medical History:   Diagnosis Date   • Alcohol abuse    • Anxiety    • Depression    • PTSD (post-traumatic stress disorder)    • Seizures (HCC)      Past Surgical History:   Procedure Laterality Date   • BACK SURGERY       Family History   Problem Relation Age of Onset   • Alcohol abuse Father        Home Medications:  Prior to Admission medications    Medication Sig Start Date End Date Taking? Authorizing Provider   amoxicillin (AMOXIL) 875 MG tablet Take 875 mg by mouth 2 (Two) Times a Day.    Provider, MD Gokul   benztropine (COGENTIN)  "1 MG tablet Take 1 mg by mouth 2 (Two) Times a Day.    Gokul Shen MD   chlordiazePOXIDE (LIBRIUM) 25 MG capsule Take 25 mg by mouth 3 (Three) Times a Day As Needed for Anxiety.    Gokul Shen MD   ibuprofen (ADVIL,MOTRIN) 800 MG tablet Take 800 mg by mouth Every 6 (Six) Hours As Needed for Mild Pain .    Gokul Shen MD   Omeprazole 20 MG Tablet Delayed Release Dispersible Take 20 mg by mouth Daily. Indications: Gastroesophageal Reflux Disease    Gokul Shen MD   prazosin (Minipress) 2 MG capsule Take 2 capsules by mouth Every Night. Indications: Frightening Dreams  Patient taking differently: Take 5 mg by mouth Every Night. Indications: Frightening Dreams 6/8/21   Dillan Breaux MD   QUEtiapine XR (SEROquel XR) 400 MG 24 hr tablet Take 1 tablet by mouth Every Night. Indications: Generalized Anxiety Disorder, PTSD 6/8/21   Dillan Breaux MD   Sofosbuvir-Velpatasvir (EPCLUSA PO) Take  by mouth.    ProviderGokul MD   Thiamine HCl (vitamin B-1) 50 MG tablet Take 100 mg by mouth Daily.    Gokul Shen MD   traZODone (DESYREL) 50 MG tablet Take 2 tablets by mouth At Night As Needed for Sleep (insomnia). Indications: Trouble Sleeping  Patient taking differently: Take 50 mg by mouth At Night As Needed for Sleep (insomnia). Indications: Trouble Sleeping 6/8/21   Dillan Breaux MD        Record Review:  I have reviewed the patient's records in [facility].     Review of Systems:  Review of Systems   I performed a 10 point review of systems which was all negative, except for the positives found in the HPI above.  Physical Exam:  /80   Pulse 96   Temp 98.3 °F (36.8 °C) (Oral)   Resp 18   Ht 170.2 cm (67\")   Wt 101 kg (223 lb 5.2 oz)   SpO2 90%   BMI 34.98 kg/m²     Physical Exam   General: Awake alert and in no obvious distress, appears mildly intoxicated    HEENT: Head normocephalic atraumatic, eyes PERRLA EOMI, nose normal, oropharynx normal.    Neck: Supple full range " of motion, no meningismus, no lymphadenopathy    Heart: Regular rate and rhythm, no murmurs or rubs, 2+ radial pulses bilaterally    Lungs: Clear to auscultation bilaterally without wheezes or crackles, no respiratory distress    Abdomen: Soft, nontender, nondistended, no rebound or guarding    Skin: Warm, dry, no rash    Musculoskeletal: Normal range of motion, no lower extremity edema    Neurologic: Oriented x3, no motor deficits no sensory deficits    Psychiatric: Mood appears stable, no psychosis, no SI or HI.        Medications in the Emergency Department:  Medications   sodium chloride 0.9 % flush 10 mL (has no administration in time range)   nicotine (NICODERM CQ) 21 MG/24HR patch 1 patch (1 patch Transdermal Medication Applied 4/21/22 1829)   sodium chloride 0.9 % flush 10 mL (has no administration in time range)   LORazepam (ATIVAN) injection 1 mg (1 mg Intramuscular Given 4/21/22 1829)   sodium chloride 0.9 % bolus 1,000 mL (1,000 mL Intravenous New Bag 4/21/22 2144)   LORazepam (ATIVAN) injection 1 mg (1 mg Intravenous Given 4/21/22 2145)        Labs  Lab Results (last 24 hours)     Procedure Component Value Units Date/Time    CBC & Differential [[id number]]  (Abnormal) Collected: 04/21/22 1704    Specimen: Blood Updated: 04/21/22 1716    Narrative:      The following orders were created for panel order CBC & Differential.  Procedure                               Abnormality         Status                     ---------                               -----------         ------                     CBC Auto Differential[[id number]]        Abnormal            Final result                 Please view results for these tests on the individual orders.    Comprehensive Metabolic Panel [[id number]]  (Abnormal) Collected: 04/21/22 1704    Specimen: Blood Updated: 04/21/22 1740     Glucose 117 mg/dL      BUN 5 mg/dL      Creatinine 0.94 mg/dL      Sodium 140 mmol/L      Potassium 4.1 mmol/L      Chloride 99 mmol/L       CO2 22.2 mmol/L      Calcium 9.4 mg/dL      Total Protein 8.2 g/dL      Albumin 4.90 g/dL      ALT (SGPT) 62 U/L      AST (SGOT) 62 U/L      Alkaline Phosphatase 79 U/L      Total Bilirubin 0.4 mg/dL      Globulin 3.3 gm/dL      A/G Ratio 1.5 g/dL      BUN/Creatinine Ratio 5.3     Anion Gap 18.8 mmol/L      eGFR 100.6 mL/min/1.73      Comment: National Kidney Foundation and American Society of Nephrology (ASN) Task Force recommended calculation based on the Chronic Kidney Disease Epidemiology Collaboration (CKD-EPI) equation refit without adjustment for race.       Narrative:      GFR Normal >60  Chronic Kidney Disease <60  Kidney Failure <15      Acetaminophen Level [[id number]]  (Normal) Collected: 04/21/22 1704    Specimen: Blood Updated: 04/21/22 1740     Acetaminophen <5.0 mcg/mL     Ethanol [[id number]]  (Abnormal) Collected: 04/21/22 1704    Specimen: Blood Updated: 04/21/22 1740     Ethanol 366 mg/dL      Ethanol % 0.366 %     Narrative:      Ethanol (Plasma)  <10 Essentially Negative    Toxic Concentrations           mg/dL    Flushing, slowing of reflexes    Impaired visual activity         Depression of CNS              >100  Possible Coma                  >300       Salicylate Level [[id number]]  (Normal) Collected: 04/21/22 1704    Specimen: Blood Updated: 04/21/22 1740     Salicylate <0.3 mg/dL     CBC Auto Differential [[id number]]  (Abnormal) Collected: 04/21/22 1704    Specimen: Blood Updated: 04/21/22 1716     WBC 9.70 10*3/mm3      RBC 4.92 10*6/mm3      Hemoglobin 15.6 g/dL      Hematocrit 43.8 %      MCV 89.0 fL      MCH 31.7 pg      MCHC 35.6 g/dL      RDW 14.3 %      RDW-SD 46.5 fl      MPV 8.9 fL      Platelets 345 10*3/mm3      Neutrophil % 61.0 %      Lymphocyte % 33.7 %      Monocyte % 4.4 %      Eosinophil % 0.2 %      Basophil % 0.4 %      Immature Grans % 0.3 %      Neutrophils, Absolute 5.91 10*3/mm3      Lymphocytes, Absolute 3.27 10*3/mm3      Monocytes, Absolute 0.43 10*3/mm3       Eosinophils, Absolute 0.02 10*3/mm3      Basophils, Absolute 0.04 10*3/mm3      Immature Grans, Absolute 0.03 10*3/mm3      nRBC 0.0 /100 WBC     Lipase [[id number]]  (Normal) Collected: 04/21/22 1704    Specimen: Blood Updated: 04/21/22 1832     Lipase 30 U/L     Urine Drug Screen - Urine, Clean Catch [[id number]]  (Abnormal) Collected: 04/21/22 1755    Specimen: Urine, Clean Catch Updated: 04/21/22 1830     Amphet/Methamphet, Screen Negative     Barbiturates Screen, Urine Negative     Benzodiazepine Screen, Urine Negative     Cocaine Screen, Urine Negative     Opiate Screen Negative     THC, Screen, Urine Positive     Methadone Screen, Urine Negative     Oxycodone Screen, Urine Negative    Narrative:      Negative Thresholds Per Drugs Screened:    Amphetamines                 500 ng/ml  Barbiturates                 200 ng/ml  Benzodiazepines              100 ng/ml  Cocaine                      300 ng/ml  Methadone                    300 ng/ml  Opiates                      300 ng/ml  Oxycodone                    100 ng/ml  THC                           50 ng/ml    The Normal Value for all drugs tested is negative. This report includes final unconfirmed screening results to be used for medical treatment purposes only. Unconfirmed results must not be used for non-medical purposes such as employment or legal testing. Clinical consideration should be applied to any drug of abuse test, particularly when unconfirmed results are used.                   Imaging:  No Radiology Exams Resulted Within Past 24 Hours     Procedures:  Procedures    Progress                            Medical Decision Making:  [facility]     This patient is a pleasant 47-year-old female with history of alcohol dependence now attempting to get some detox arranged for his alcohol abuse.    He did drink a significant mount earlier today and arrives with a blood alcohol level that is significantly elevated measuring 366 on arrival.    We are observing him  in the ED and allowing him to sober up and metabolize off some of this excess alcohol.    We are also doing blood work and urine drug screen as part of a medical clearance work-up here.    It looks like he will soon be sobered up and ready to be sent over to [facility] detox facility.    Unfortunately, [facility] prefers his blood alcohol level to be 0, at which level he will surely withdrawal and likely seize.    I am happy to give him some Ativan and a nicotine patch while letting him sober up in the ED.      I was notified a couple hours later by his nurse that he is starting to show signs of withdrawal and becoming more anxious and somewhat tachycardic, so we gave him another dose of IV Ativan and some fluids here.      While observing the patient in the ED and waiting for his blood alcohol level to come back less than 80 to transfer him over to [facility] detox facility, I will need to sign this patient out to my colleague in the ED at change of shift to disposition appropriately.      Final diagnoses:   Alcohol dependence with uncomplicated intoxication (HCC)   Alcohol withdrawal syndrome without complication (HCC)        Disposition:  ED Disposition     None                 Juarez Sunshine MD  04/21/22 1618

## 2022-04-22 VITALS
TEMPERATURE: 98.3 F | BODY MASS INDEX: 35.05 KG/M2 | HEIGHT: 67 IN | OXYGEN SATURATION: 97 % | DIASTOLIC BLOOD PRESSURE: 75 MMHG | WEIGHT: 223.33 LBS | SYSTOLIC BLOOD PRESSURE: 135 MMHG | HEART RATE: 93 BPM | RESPIRATION RATE: 20 BRPM

## 2022-04-22 LAB
ETHANOL BLD-MCNC: 43 MG/DL (ref 0–10)
ETHANOL UR QL: 0.04 %

## 2022-04-22 PROCEDURE — 25010000002 LORAZEPAM PER 2 MG: Performed by: EMERGENCY MEDICINE

## 2022-04-22 PROCEDURE — 96375 TX/PRO/DX INJ NEW DRUG ADDON: CPT

## 2022-04-22 PROCEDURE — 82077 ASSAY SPEC XCP UR&BREATH IA: CPT | Performed by: EMERGENCY MEDICINE

## 2022-04-22 PROCEDURE — 25010000002 ONDANSETRON PER 1 MG: Performed by: EMERGENCY MEDICINE

## 2022-04-22 PROCEDURE — 96376 TX/PRO/DX INJ SAME DRUG ADON: CPT

## 2022-04-22 RX ORDER — ONDANSETRON 2 MG/ML
4 INJECTION INTRAMUSCULAR; INTRAVENOUS ONCE
Status: COMPLETED | OUTPATIENT
Start: 2022-04-22 | End: 2022-04-22

## 2022-04-22 RX ORDER — LORAZEPAM 2 MG/ML
1 INJECTION INTRAMUSCULAR ONCE
Status: COMPLETED | OUTPATIENT
Start: 2022-04-22 | End: 2022-04-22

## 2022-04-22 RX ADMIN — LORAZEPAM 1 MG: 2 INJECTION INTRAMUSCULAR; INTRAVENOUS at 01:55

## 2022-04-22 RX ADMIN — ONDANSETRON 4 MG: 2 INJECTION INTRAMUSCULAR; INTRAVENOUS at 08:29

## 2022-04-22 NOTE — SIGNIFICANT NOTE
"   04/21/22 2038   Plan   Final Note SW notified that pt was interested in inpatient substance abuse treatment. Nurse had contacted [facility] who reported that pt would have to be below .08 before he could return to the facility. SW contacted [facility], [facility] and [facility] with no beds or availability at this time. [facility] and [facility] do not currently accept pts insurance. SW contact [facility] and spoke with the supervisor of intake. Supervisor reported that pt  did not have a current bed but that they do have beds available and once pt is below .08 he should be able to obtain a bed at their facility. SW inquired as to the reason the pt had to have a alcohol level below .08 and she stated it was due to pt fall risk. SW inquired about being able to send referral for review, [facility] supervisor reported that she would look at the referral but that they would need an updated alcohol before they can accept. Supervisor reported that he does not have to be a \"0\" but has to be below the legal limit. SW updated provider. Provider requested that SW send referral and then will send the update once is obtained. SW faxed referral to [facility]. SW followed up with [facility] and they reported they have the referral but will not even review until an update is sent. MIRTHA inquired about how the pt was deemed a fall risk and supervisor reported that pt reported falling to intake staff, law enforcement and EMS. MIRTHA updated provider.     "

## 2022-04-22 NOTE — SIGNIFICANT NOTE
04/21/22 7940   Plan   Final Note SW received phone call from [facility] that they will accept pt and have an accepting doctor, however, pt will still need to be below .08. [facility] reported that once he is below that level to call and provide update for transfer. SW notified provider and nurse this date.

## 2022-12-06 ENCOUNTER — HOSPITAL ENCOUNTER (EMERGENCY)
Facility: HOSPITAL | Age: 47
Discharge: HOME | End: 2022-12-06
Attending: EMERGENCY MEDICINE
Payer: MEDICARE

## 2022-12-06 VITALS
HEIGHT: 67 IN | BODY MASS INDEX: 31.15 KG/M2 | WEIGHT: 198.44 LBS | RESPIRATION RATE: 13 BRPM | DIASTOLIC BLOOD PRESSURE: 84 MMHG | HEART RATE: 81 BPM | TEMPERATURE: 98 F | OXYGEN SATURATION: 97 % | SYSTOLIC BLOOD PRESSURE: 125 MMHG

## 2022-12-06 DIAGNOSIS — Z13.9 SCREENING DUE: ICD-10-CM

## 2022-12-06 DIAGNOSIS — F19.10 DRUG ABUSE: Primary | ICD-10-CM

## 2022-12-06 DIAGNOSIS — Z00.8 MEDICAL CLEARANCE FOR PSYCHIATRIC ADMISSION: ICD-10-CM

## 2022-12-06 LAB
ALBUMIN SERPL-MCNC: 3.9 GM/DL (ref 3.5–5)
ALBUMIN/GLOB SERPL: 1 RATIO (ref 1.1–2)
ALP SERPL-CCNC: 88 UNIT/L (ref 40–150)
ALT SERPL-CCNC: 40 UNIT/L (ref 0–55)
AMPHET UR QL SCN: NEGATIVE
APPEARANCE UR: CLEAR
AST SERPL-CCNC: 33 UNIT/L (ref 5–34)
BACTERIA #/AREA URNS AUTO: ABNORMAL /HPF
BARBITURATE SCN PRESENT UR: NEGATIVE
BASOPHILS # BLD AUTO: 0.04 X10(3)/MCL (ref 0–0.2)
BASOPHILS NFR BLD AUTO: 0.4 %
BENZODIAZ UR QL SCN: NEGATIVE
BILIRUB UR QL STRIP.AUTO: NEGATIVE MG/DL
BILIRUBIN DIRECT+TOT PNL SERPL-MCNC: 0.3 MG/DL
BUN SERPL-MCNC: 6.1 MG/DL (ref 8.9–20.6)
CALCIUM SERPL-MCNC: 9 MG/DL (ref 8.4–10.2)
CANNABINOIDS UR QL SCN: POSITIVE
CHLORIDE SERPL-SCNC: 106 MMOL/L (ref 98–107)
CO2 SERPL-SCNC: 21 MMOL/L (ref 22–29)
COCAINE UR QL SCN: NEGATIVE
COLOR UR AUTO: ABNORMAL
CREAT SERPL-MCNC: 0.88 MG/DL (ref 0.73–1.18)
EOSINOPHIL # BLD AUTO: 0.05 X10(3)/MCL (ref 0–0.9)
EOSINOPHIL NFR BLD AUTO: 0.5 %
ERYTHROCYTE [DISTWIDTH] IN BLOOD BY AUTOMATED COUNT: 13.2 % (ref 11.5–17)
ETHANOL SERPL-MCNC: 161 MG/DL
ETHANOL SERPL-MCNC: 225 MG/DL
FENTANYL UR QL SCN: NEGATIVE
GFR SERPLBLD CREATININE-BSD FMLA CKD-EPI: >60 MLS/MIN/1.73/M2
GLOBULIN SER-MCNC: 3.8 GM/DL (ref 2.4–3.5)
GLUCOSE SERPL-MCNC: 110 MG/DL (ref 74–100)
GLUCOSE UR QL STRIP.AUTO: NORMAL MG/DL
HCT VFR BLD AUTO: 45 % (ref 42–52)
HGB BLD-MCNC: 15.4 GM/DL (ref 14–18)
HYALINE CASTS #/AREA URNS LPF: ABNORMAL /LPF
IMM GRANULOCYTES # BLD AUTO: 0.04 X10(3)/MCL (ref 0–0.04)
IMM GRANULOCYTES NFR BLD AUTO: 0.4 %
KETONES UR QL STRIP.AUTO: NEGATIVE MG/DL
LEUKOCYTE ESTERASE UR QL STRIP.AUTO: NEGATIVE UNIT/L
LYMPHOCYTES # BLD AUTO: 3.43 X10(3)/MCL (ref 0.6–4.6)
LYMPHOCYTES NFR BLD AUTO: 33 %
MAGNESIUM SERPL-MCNC: 2 MG/DL (ref 1.6–2.6)
MCH RBC QN AUTO: 29.5 PG (ref 27–31)
MCHC RBC AUTO-ENTMCNC: 34.2 MG/DL (ref 33–36)
MCV RBC AUTO: 86.2 FL (ref 80–94)
MDMA UR QL SCN: NEGATIVE
MONOCYTES # BLD AUTO: 0.59 X10(3)/MCL (ref 0.1–1.3)
MONOCYTES NFR BLD AUTO: 5.7 %
MUCOUS THREADS URNS QL MICRO: ABNORMAL /LPF
NEUTROPHILS # BLD AUTO: 6.2 X10(3)/MCL (ref 2.1–9.2)
NEUTROPHILS NFR BLD AUTO: 60 %
NITRITE UR QL STRIP.AUTO: NEGATIVE
NRBC BLD AUTO-RTO: 0 %
OPIATES UR QL SCN: POSITIVE
PCP UR QL: NEGATIVE
PH UR STRIP.AUTO: 6 [PH]
PH UR: 6 [PH] (ref 3–11)
PLATELET # BLD AUTO: 401 X10(3)/MCL (ref 130–400)
PMV BLD AUTO: 8.9 FL (ref 7.4–10.4)
POTASSIUM SERPL-SCNC: 3.7 MMOL/L (ref 3.5–5.1)
PROT SERPL-MCNC: 7.7 GM/DL (ref 6.4–8.3)
PROT UR QL STRIP.AUTO: NEGATIVE MG/DL
RBC # BLD AUTO: 5.22 X10(6)/MCL (ref 4.7–6.1)
RBC #/AREA URNS AUTO: ABNORMAL /HPF
RBC UR QL AUTO: NEGATIVE UNIT/L
SODIUM SERPL-SCNC: 140 MMOL/L (ref 136–145)
SP GR UR STRIP.AUTO: 1.01
SPECIFIC GRAVITY, URINE AUTO (.000) (OHS): 1.01 (ref 1–1.03)
SQUAMOUS #/AREA URNS LPF: ABNORMAL /HPF
UROBILINOGEN UR STRIP-ACNC: NORMAL MG/DL
WBC # SPEC AUTO: 10.4 X10(3)/MCL (ref 4.5–11.5)
WBC #/AREA URNS AUTO: ABNORMAL /HPF

## 2022-12-06 PROCEDURE — 63600175 PHARM REV CODE 636 W HCPCS: Performed by: EMERGENCY MEDICINE

## 2022-12-06 PROCEDURE — 85025 COMPLETE CBC W/AUTO DIFF WBC: CPT | Performed by: EMERGENCY MEDICINE

## 2022-12-06 PROCEDURE — 93005 ELECTROCARDIOGRAM TRACING: CPT

## 2022-12-06 PROCEDURE — 96374 THER/PROPH/DIAG INJ IV PUSH: CPT

## 2022-12-06 PROCEDURE — 82077 ASSAY SPEC XCP UR&BREATH IA: CPT | Performed by: EMERGENCY MEDICINE

## 2022-12-06 PROCEDURE — 81001 URINALYSIS AUTO W/SCOPE: CPT | Performed by: EMERGENCY MEDICINE

## 2022-12-06 PROCEDURE — 96361 HYDRATE IV INFUSION ADD-ON: CPT

## 2022-12-06 PROCEDURE — 80053 COMPREHEN METABOLIC PANEL: CPT | Performed by: EMERGENCY MEDICINE

## 2022-12-06 PROCEDURE — 80307 DRUG TEST PRSMV CHEM ANLYZR: CPT | Performed by: EMERGENCY MEDICINE

## 2022-12-06 PROCEDURE — 83735 ASSAY OF MAGNESIUM: CPT | Performed by: EMERGENCY MEDICINE

## 2022-12-06 PROCEDURE — 99285 EMERGENCY DEPT VISIT HI MDM: CPT | Mod: 25

## 2022-12-06 PROCEDURE — 25000003 PHARM REV CODE 250: Performed by: EMERGENCY MEDICINE

## 2022-12-06 RX ORDER — ONDANSETRON 2 MG/ML
4 INJECTION INTRAMUSCULAR; INTRAVENOUS
Status: COMPLETED | OUTPATIENT
Start: 2022-12-06 | End: 2022-12-06

## 2022-12-06 RX ADMIN — SODIUM CHLORIDE 1000 ML: 9 INJECTION, SOLUTION INTRAVENOUS at 02:12

## 2022-12-06 RX ADMIN — ONDANSETRON 4 MG: 2 INJECTION INTRAMUSCULAR; INTRAVENOUS at 03:12

## 2022-12-06 NOTE — ED PROVIDER NOTES
"Encounter Date: 12/6/2022       History     Chief Complaint   Patient presents with    Seizures     Pt presents to the ED reporting he had a seizure today. Pt is trying to detox from heroin.      Requesting medical clearance for vermilion (alcohol and heroin)      Mental Health Problem  The primary symptoms include depressed mood and dysphoric mood. The primary symptoms do not include aggression, agitation, bizarre behavior, disorganized speech, disorganized thinking, hallucinations, homicidal ideas, negative symptoms, paranoia, self-injury, somatic symptoms, suicidal ideas, suicidal threats or suicide attempt. The current episode started several weeks ago. This is a recurrent problem.   The onset of the illness is precipitated by alcohol abuse and drug abuse. The degree of incapacity that he is experiencing as a consequence of his illness is moderate. Sequelae of the illness include an inability to work and harmed interpersonal relations. Additional symptoms of the illness include anhedonia, insomnia, fatigue, attention impairment, distractible and poor judgment. Additional symptoms of the illness do not include hypersomnia, appetite change, unexpected weight change, agitation, psychomotor retardation, feelings of worthlessness, euphoric mood, increased goal-directed activity, flight of ideas, inflated self-esteem, decreased need for sleep, visual change, headaches or abdominal pain. Associated symptoms comments: Patient endorses shaking activity described as "seizure" denies loc during the event, endorses similar episodes in the past occasionally; no seizure medications, no history of seizure medications; no loss of bowel or bladder control ;. He does not admit to suicidal ideas. He does not have a plan to attempt suicide. He does not contemplate harming himself. He has not already injured self. He does not contemplate injuring another person. He has not already  injured another person. Risk factors that are " present for mental illness include substance abuse.   Review of patient's allergies indicates:   Allergen Reactions    Penicillins     Penicillin Hives and Rash     Past Medical History:   Diagnosis Date    Alcohol abuse     Depression     Hepatitis C      History reviewed. No pertinent surgical history.  History reviewed. No pertinent family history.  Social History     Tobacco Use    Smoking status: Every Day     Packs/day: 1.00     Years: 10.00     Pack years: 10.00     Types: Cigarettes    Smokeless tobacco: Never   Substance Use Topics    Drug use: Yes     Review of Systems   Constitutional:  Positive for fatigue. Negative for appetite change and unexpected weight change.   Gastrointestinal:  Negative for abdominal pain.   Neurological:  Negative for headaches.   Psychiatric/Behavioral:  Positive for dysphoric mood. Negative for agitation, hallucinations, homicidal ideas, paranoia, self-injury and suicidal ideas. The patient has insomnia.    All other systems reviewed and are negative.    Physical Exam     Initial Vitals [12/06/22 1245]   BP Pulse Resp Temp SpO2   121/80 86 16 97.9 °F (36.6 °C) 99 %      MAP       --         Physical Exam    Nursing note and vitals reviewed.  Constitutional: He appears well-developed and well-nourished. He is not diaphoretic. No distress.   HENT:   Head: Normocephalic and atraumatic.   Right Ear: External ear normal.   Left Ear: External ear normal.   Mouth/Throat: No oropharyngeal exudate.   Eyes: EOM are normal. Pupils are equal, round, and reactive to light. Right eye exhibits no discharge. Left eye exhibits no discharge.   Neck: Neck supple. No thyromegaly present. No tracheal deviation present. No JVD present.   Normal range of motion.  Cardiovascular:  Normal rate, regular rhythm, normal heart sounds and intact distal pulses.     Exam reveals no gallop and no friction rub.       No murmur heard.  Pulmonary/Chest: Breath sounds normal. No stridor. No respiratory distress.  He has no wheezes. He has no rhonchi. He has no rales.   Abdominal: Abdomen is soft. Bowel sounds are normal. He exhibits no distension. There is no abdominal tenderness. There is no rebound and no guarding.   Musculoskeletal:         General: No tenderness or edema. Normal range of motion.      Cervical back: Normal range of motion and neck supple.     Neurological: He is alert and oriented to person, place, and time. He has normal strength. GCS score is 15. GCS eye subscore is 4. GCS verbal subscore is 5. GCS motor subscore is 6.   Mild generalized psychomotor slowing compatible with intoxication as endorsed; no focal defects appreciated ;   Skin: Skin is warm and dry. Capillary refill takes less than 2 seconds. No rash and no abscess noted. No erythema. No pallor.   Psychiatric: He has a normal mood and affect. His behavior is normal. Judgment and thought content normal.       ED Course   Procedures  Labs Reviewed   COMPREHENSIVE METABOLIC PANEL - Abnormal; Notable for the following components:       Result Value    Carbon Dioxide 21 (*)     Glucose Level 110 (*)     Blood Urea Nitrogen 6.1 (*)     Globulin 3.8 (*)     Albumin/Globulin Ratio 1.0 (*)     All other components within normal limits   ALCOHOL,MEDICAL (ETHANOL) - Abnormal; Notable for the following components:    Ethanol Level 225.0 (*)     All other components within normal limits   URINALYSIS, REFLEX TO URINE CULTURE - Abnormal; Notable for the following components:    Mucous, UA Trace (*)     All other components within normal limits   DRUG SCREEN, URINE (BEAKER) - Abnormal; Notable for the following components:    Cannabinoids, Urine Positive (*)     Opiates, Urine Positive (*)     All other components within normal limits    Narrative:     Cut off concentrations:    Amphetamines - 1000 ng/ml  Barbiturates - 200 ng/ml  Benzodiazepine - 200 ng/ml  Cannabinoids (THC) - 50 ng/ml  Cocaine - 300 ng/ml  Fentanyl - 1.0 ng/ml  MDMA - 500 ng/ml  Opiates -  300 ng/ml   Phencyclidine (PCP) - 25 ng/ml    Specimen submitted for drug analysis and tested for pH and specific gravity in order to evaluate sample integrity. Suspect tampering if specific gravity is <1.003 and/or pH is not within the range of 4.5 - 8.0  False negatives may result form substances such as bleach added to urine.  False positives may result for the presence of a substance with similar chemical structure to the drug or its metabolite.    This test provides only a PRELIMINARY analytical test result. A more specific alternate chemical method must be used in order to obtain a confirmed analytical result. Gas chromatography/mass spectrometry (GC/MS) is the preferred confirmatory method. Other chemical confirmation methods are available. Clinical consideration and professional judgement should be applied to any drug of abuse test result, particularly when preliminary positive results are used.    Positive results will be confirmed only at the physicians request. Unconfirmed screening results are to be used only for medical purposes (treatment).        CBC WITH DIFFERENTIAL - Abnormal; Notable for the following components:    Platelet 401 (*)     All other components within normal limits   ALCOHOL,MEDICAL (ETHANOL) - Abnormal; Notable for the following components:    Ethanol Level 161.0 (*)     All other components within normal limits   MAGNESIUM - Normal   CBC W/ AUTO DIFFERENTIAL    Narrative:     The following orders were created for panel order CBC auto differential.  Procedure                               Abnormality         Status                     ---------                               -----------         ------                     CBC with Differential[[id number]]        Abnormal            Final result                 Please view results for these tests on the individual orders.     EKG Readings: (Independently Interpreted)   NSR @ 76, non acute and non ischemic appearing ;   ECG Results               EKG 12-lead (Final result)  Result time 12/06/22 17:09:01      Final result by Interface, Lab In [facility] (12/06/22 17:09:01)                   Narrative:    Test Reason : Z13.9,    Vent. Rate : 076 BPM     Atrial Rate : 076 BPM     P-R Int : 178 ms          QRS Dur : 084 ms      QT Int : 390 ms       P-R-T Axes : 036 046 050 degrees     QTc Int : 438 ms    Normal sinus rhythm  Normal ECG  When compared with ECG of 23-NOV-2018 01:50,  T wave amplitude has decreased in Lateral leads  Confirmed by Theresa Newberry MD ([id number]) on 12/6/2022 5:08:55 PM    Referred By: [email or web address]   SELF           Confirmed By:Theresa Newberry MD                                  Imaging Results              CT Head Without Contrast (Final result)  Result time 12/06/22 14:51:53      Final result by Durga Kruger MD (12/06/22 14:51:53)                   Impression:        Mild bifrontal white matter changes are nonspecific but are statistically most likely white matter microvascular ischemic changes are focal gliosis and are not significantly changed since the previous exam.  Otherwise, no acute intracranial process is identified.      Electronically signed by: Durga Kruger MD  Date:    12/06/2022  Time:    14:51               Narrative:      CT HEAD WITHOUT CONTRAST:    CPT 00486    Total DLP: 1271.10 mGy-cm. Automatic exposure control was utilized to limit the radiation dose to the patient.    HISTORY: Episodic altered mental status with reported seizures.    Comparison: 11/23/2018.    TECHNIQUE: Multiple contiguous axial images were acquired from the base of the skull and the vertex without contrast administration.    FINDINGS:    The ventricles and basal cisterns appear normal.  There are mild diffuse involutional changes most prominent involving both frontal lobes.  There are mild patchy low-density foci without mass effect in the bifrontal white matter not significantly changed since the previous exam.  The gray-white junctions are  "maintained.  No other cerebral or cerebellar parenchymal abnormality is identified. There is no hemorrhage, midline shift, significant mass-effect, or extra-axial fluid collection. The partially visualized paranasal sinuses and mastoid air cells are clear. The calvarium is intact with no fractures or subluxations in the visualized osseous structures.  There is a left lateral supraorbital metallic piercing in place that is unchanged.                                    X-Rays:   Independently Interpreted Readings:   Head CT: Chronic microvascular ischemic changes again noted; no acute abnormal ;   Medications   sodium chloride 0.9% bolus 1,000 mL (1,000 mLs Intravenous New Bag 12/6/22 1405)   ondansetron injection 4 mg (4 mg Intravenous Given 12/6/22 5761)     Medical Decision Making:   History:   Old Medical Records: I decided to obtain old medical records.  Old Records Summarized: other records.       <> Summary of Records: Previous visits confirm history of polysubstance abuse, confirm one previous admission for SI in setting of depression. Patient without suicidal ideas at today's evaluation.  Initial Assessment:   Patient presents appearing intoxicated and endorsing intoxication. The reported history of "seizure" sounds unlikely to represent an epileptiform process and also is not new.   Differential Diagnosis:   Intoxication, metabolic derangement, dehydration, electrolyte disturbance. Risk found sufficient to warrant expanded evaluation with objective data in order to diminish probability an emergent process could remain occult.   Independently Interpreted Test(s):   I have ordered and independently interpreted X-rays - see prior notes.  I have ordered and independently interpreted EKG Reading(s) - see prior notes  Clinical Tests:   Lab Tests: Ordered and Reviewed  Radiological Study: Ordered and Reviewed  ED Management:  Clinical course in the ED remains reassuring. Improves with crystalloid fluid bolus. Lab " data resulted and reassuring. Ekg and images are reassuring. Patient is medically cleared for rehabilitative services. Dc in stable condition without event.            ED Course as of 12/06/22 1733   Tue Dec 06, 2022   1420 Measured etoh level 225 ; [CT]   1420 Chemistries compatible with mild dehydration; otherwise generally reassuring data ; [CT]   1421 Normal mag ; [CT]   1421 Reassurig hemogram ; [CT]   1523 Reassuring UA ; [CT]   1543 Utox positive for cannabinoids ; positive for opiates ; [CT]   1729 Etoh down trending at 161 ; [CT]      ED Course User Index  [CT] Figueroa Hawkins MD                   Clinical Impression:   Final diagnoses:  [Z13.9] Screening due  [F19.10] Drug abuse (Primary)  [Z00.8] Medical clearance for psychiatric admission      ED Disposition Condition    Discharge Stable          ED Prescriptions    None       Follow-up Information       Follow up With Specialties Details Why Contact Info    [facility] Dept Emergency Medicine  As needed, If symptoms worsen [address] [facility] [place] [address] [phone]             Figueroa Hawkins MD  12/06/22 1733

## 2023-05-04 ENCOUNTER — HOSPITAL ENCOUNTER (EMERGENCY)
Facility: HOSPITAL | Age: 48
Discharge: PSYCHIATRIC OR SUBSTANCE USE FACILITY | End: 2023-05-04
Attending: INTERNAL MEDICINE
Payer: MEDICARE

## 2023-05-04 VITALS
BODY MASS INDEX: 31.08 KG/M2 | WEIGHT: 198 LBS | HEART RATE: 87 BPM | DIASTOLIC BLOOD PRESSURE: 79 MMHG | OXYGEN SATURATION: 100 % | SYSTOLIC BLOOD PRESSURE: 128 MMHG | RESPIRATION RATE: 16 BRPM

## 2023-05-04 DIAGNOSIS — F32.A DEPRESSION WITH SUICIDAL IDEATION: Primary | ICD-10-CM

## 2023-05-04 DIAGNOSIS — R45.851 DEPRESSION WITH SUICIDAL IDEATION: Primary | ICD-10-CM

## 2023-05-04 LAB
ALBUMIN SERPL-MCNC: 4 G/DL (ref 3.5–5)
ALBUMIN/GLOB SERPL: 1.2 RATIO (ref 1.1–2)
ALP SERPL-CCNC: 79 UNIT/L (ref 40–150)
ALT SERPL-CCNC: 63 UNIT/L (ref 0–55)
AMPHET UR QL SCN: NEGATIVE
APAP SERPL-MCNC: <17.4 UG/ML (ref 17.4–30)
APPEARANCE UR: CLEAR
AST SERPL-CCNC: 60 UNIT/L (ref 5–34)
BACTERIA #/AREA URNS AUTO: ABNORMAL /HPF
BARBITURATE SCN PRESENT UR: NEGATIVE
BASOPHILS # BLD AUTO: 0.04 X10(3)/MCL
BASOPHILS NFR BLD AUTO: 0.3 %
BENZODIAZ UR QL SCN: NEGATIVE
BILIRUB UR QL STRIP.AUTO: NEGATIVE MG/DL
BILIRUBIN DIRECT+TOT PNL SERPL-MCNC: 0.3 MG/DL
BUN SERPL-MCNC: 8.7 MG/DL (ref 8.9–20.6)
CALCIUM SERPL-MCNC: 9 MG/DL (ref 8.4–10.2)
CANNABINOIDS UR QL SCN: POSITIVE
CHLORIDE SERPL-SCNC: 104 MMOL/L (ref 98–107)
CO2 SERPL-SCNC: 19 MMOL/L (ref 22–29)
COCAINE UR QL SCN: NEGATIVE
COLOR UR AUTO: YELLOW
CREAT SERPL-MCNC: 0.93 MG/DL (ref 0.73–1.18)
EOSINOPHIL # BLD AUTO: 0.01 X10(3)/MCL (ref 0–0.9)
EOSINOPHIL NFR BLD AUTO: 0.1 %
ERYTHROCYTE [DISTWIDTH] IN BLOOD BY AUTOMATED COUNT: 14.6 % (ref 11.5–17)
ETHANOL SERPL-MCNC: 24 MG/DL
FENTANYL UR QL SCN: POSITIVE
GFR SERPLBLD CREATININE-BSD FMLA CKD-EPI: >60 MLS/MIN/1.73/M2
GLOBULIN SER-MCNC: 3.3 GM/DL (ref 2.4–3.5)
GLUCOSE SERPL-MCNC: 96 MG/DL (ref 74–100)
GLUCOSE UR QL STRIP.AUTO: NORMAL MG/DL
HCT VFR BLD AUTO: 43.2 % (ref 42–52)
HGB BLD-MCNC: 14.8 G/DL (ref 14–18)
HYALINE CASTS #/AREA URNS LPF: ABNORMAL /LPF
IMM GRANULOCYTES # BLD AUTO: 0.11 X10(3)/MCL (ref 0–0.04)
IMM GRANULOCYTES NFR BLD AUTO: 0.8 %
KETONES UR QL STRIP.AUTO: ABNORMAL MG/DL
LEUKOCYTE ESTERASE UR QL STRIP.AUTO: 250 UNIT/L
LYMPHOCYTES # BLD AUTO: 2.24 X10(3)/MCL (ref 0.6–4.6)
LYMPHOCYTES NFR BLD AUTO: 16 %
MCH RBC QN AUTO: 31.1 PG (ref 27–31)
MCHC RBC AUTO-ENTMCNC: 34.3 G/DL (ref 33–36)
MCV RBC AUTO: 90.8 FL (ref 80–94)
MDMA UR QL SCN: NEGATIVE
MONOCYTES # BLD AUTO: 0.79 X10(3)/MCL (ref 0.1–1.3)
MONOCYTES NFR BLD AUTO: 5.6 %
MUCOUS THREADS URNS QL MICRO: ABNORMAL /LPF
NEUTROPHILS # BLD AUTO: 10.81 X10(3)/MCL (ref 2.1–9.2)
NEUTROPHILS NFR BLD AUTO: 77.2 %
NITRITE UR QL STRIP.AUTO: NEGATIVE
NRBC BLD AUTO-RTO: 0 %
OPIATES UR QL SCN: NEGATIVE
PCP UR QL: NEGATIVE
PH UR STRIP.AUTO: 6 [PH]
PH UR: 6 [PH] (ref 3–11)
PLATELET # BLD AUTO: 263 X10(3)/MCL (ref 130–400)
PMV BLD AUTO: 8.7 FL (ref 7.4–10.4)
POTASSIUM SERPL-SCNC: 3.6 MMOL/L (ref 3.5–5.1)
PROT SERPL-MCNC: 7.3 GM/DL (ref 6.4–8.3)
PROT UR QL STRIP.AUTO: ABNORMAL MG/DL
RBC # BLD AUTO: 4.76 X10(6)/MCL (ref 4.7–6.1)
RBC #/AREA URNS AUTO: ABNORMAL /HPF
RBC UR QL AUTO: NEGATIVE UNIT/L
SALICYLATES SERPL-MCNC: <5 MG/DL
SARS-COV-2 RDRP RESP QL NAA+PROBE: NEGATIVE
SODIUM SERPL-SCNC: 136 MMOL/L (ref 136–145)
SP GR UR STRIP.AUTO: 1.02
SQUAMOUS #/AREA URNS LPF: ABNORMAL /HPF
TSH SERPL-ACNC: 2.15 UIU/ML (ref 0.35–4.94)
UROBILINOGEN UR STRIP-ACNC: NORMAL MG/DL
WBC # SPEC AUTO: 14 X10(3)/MCL (ref 4.5–11.5)
WBC #/AREA URNS AUTO: ABNORMAL /HPF

## 2023-05-04 PROCEDURE — 85025 COMPLETE CBC W/AUTO DIFF WBC: CPT | Performed by: INTERNAL MEDICINE

## 2023-05-04 PROCEDURE — 80179 DRUG ASSAY SALICYLATE: CPT | Performed by: INTERNAL MEDICINE

## 2023-05-04 PROCEDURE — 82077 ASSAY SPEC XCP UR&BREATH IA: CPT | Performed by: INTERNAL MEDICINE

## 2023-05-04 PROCEDURE — 87077 CULTURE AEROBIC IDENTIFY: CPT | Performed by: INTERNAL MEDICINE

## 2023-05-04 PROCEDURE — 80053 COMPREHEN METABOLIC PANEL: CPT | Performed by: INTERNAL MEDICINE

## 2023-05-04 PROCEDURE — 87635 SARS-COV-2 COVID-19 AMP PRB: CPT | Performed by: INTERNAL MEDICINE

## 2023-05-04 PROCEDURE — 81001 URINALYSIS AUTO W/SCOPE: CPT | Mod: 59 | Performed by: INTERNAL MEDICINE

## 2023-05-04 PROCEDURE — 80143 DRUG ASSAY ACETAMINOPHEN: CPT | Performed by: INTERNAL MEDICINE

## 2023-05-04 PROCEDURE — 84443 ASSAY THYROID STIM HORMONE: CPT | Performed by: INTERNAL MEDICINE

## 2023-05-04 PROCEDURE — 99285 EMERGENCY DEPT VISIT HI MDM: CPT

## 2023-05-04 PROCEDURE — 80307 DRUG TEST PRSMV CHEM ANLYZR: CPT | Performed by: INTERNAL MEDICINE

## 2023-05-04 RX ORDER — IBUPROFEN 200 MG
1 TABLET ORAL DAILY
Status: DISCONTINUED | OUTPATIENT
Start: 2023-05-05 | End: 2023-05-04 | Stop reason: HOSPADM

## 2023-05-04 NOTE — ED PROVIDER NOTES
Encounter Date: 5/4/2023       History     Chief Complaint   Patient presents with    Suicidal     +SI, last opiate use yesterday, plan to OD. +n/v. Given 4mg of zofran en route     Presents by EMS with suicidal ideations. Polysubstance abuse plan to OD in drugs.    The history is provided by the patient and the EMS personnel.   Review of patient's allergies indicates:   Allergen Reactions    Penicillins     Penicillin Hives and Rash     Past Medical History:   Diagnosis Date    Alcohol abuse     Depression     Hepatitis C      No past surgical history on file.  No family history on file.  Social History     Tobacco Use    Smoking status: Every Day     Packs/day: 1.00     Years: 10.00     Pack years: 10.00     Types: Cigarettes    Smokeless tobacco: Never   Substance Use Topics    Drug use: Yes     Review of Systems   Unable to perform ROS: Psychiatric disorder     Physical Exam     Initial Vitals [05/04/23 1448]   BP Pulse Resp Temp SpO2   130/80 88 18 -- 100 %      MAP       --         Physical Exam    Nursing note and vitals reviewed.  Constitutional: He appears well-developed.   HENT:   Head: Normocephalic and atraumatic.   Eyes: Conjunctivae and EOM are normal. Pupils are equal, round, and reactive to light.   Neck: Neck supple.   Normal range of motion.  Cardiovascular:  Regular rhythm, normal heart sounds and intact distal pulses.           Pulmonary/Chest: Breath sounds normal. No respiratory distress.   Abdominal: Abdomen is soft. Bowel sounds are normal. He exhibits no distension. There is no abdominal tenderness. There is no rebound and no guarding.   Musculoskeletal:         General: No edema. Normal range of motion.      Cervical back: Normal range of motion and neck supple.     Neurological: He is alert and oriented to person, place, and time. He has normal strength.   Skin: Skin is warm and dry. No rash noted.   Psychiatric: His speech is normal. He is slowed. He is not actively hallucinating.  Cognition and memory are normal. He expresses impulsivity and inappropriate judgment. He exhibits a depressed mood. He expresses suicidal ideation. He expresses suicidal plans. He is attentive.       ED Course   Procedures  Labs Reviewed   CBC WITH DIFFERENTIAL - Abnormal; Notable for the following components:       Result Value    WBC 14.00 (*)     MCH 31.1 (*)     Neut # 10.81 (*)     IG# 0.11 (*)     All other components within normal limits   CBC W/ AUTO DIFFERENTIAL    Narrative:     The following orders were created for panel order CBC auto differential.  Procedure                               Abnormality         Status                     ---------                               -----------         ------                     CBC with Differential[[id number]]        Abnormal            Final result                 Please view results for these tests on the individual orders.   COMPREHENSIVE METABOLIC PANEL   TSH   URINALYSIS, REFLEX TO URINE CULTURE   DRUG SCREEN, URINE (BEAKER)   ALCOHOL,MEDICAL (ETHANOL)   ACETAMINOPHEN LEVEL   SALICYLATE LEVEL   SARS-COV-2 RNA AMPLIFICATION, QUAL   EXTRA TUBES    Narrative:     The following orders were created for panel order EXTRA TUBES.  Procedure                               Abnormality         Status                     ---------                               -----------         ------                     Light Blue Top Hold[[id number]]                              In process                 Gold Top Hold[[id number]]                                    In process                   Please view results for these tests on the individual orders.   LIGHT BLUE TOP HOLD   GOLD TOP HOLD          Imaging Results    None          Medications   nicotine 14 mg/24 hr 1 patch (has no administration in time range)     Medical Decision Making:   History:   I obtained history from: EMS provider.       <> Summary of History: See HPI.  Zofran 4 mg given by EMS  Old Records Summarized:  records from clinic visits and records from previous admission(s).       <> Summary of Records: Last visit 12/2022  Differential Diagnosis:   Schizophrenia exacerbation, bipolar psychosis, drug induced psychosis, thyrotoxicosis, renal failure, liver failure, toxydrome, among others    Independently Interpreted Test(s):   I have ordered and independently interpreted EKG Reading(s) - see prior notes  Clinical Tests:   Lab Tests: Ordered  Medical Tests: Ordered  Other:   I have discussed this case with another health care provider.              Medically cleared for psychiatry placement: 5/4/2023  3:26 PM         Clinical Impression:   Final diagnoses:  [F32.A, R45.851] Depression with suicidal ideation (Primary)        ED Disposition Condition    Transfer to Psych Facility Stable          ED Prescriptions    None       Follow-up Information    None          Cyrus Freedman MD  05/04/23 1528       Cyrus Freedman MD  05/22/23 1301

## 2023-05-06 LAB — BACTERIA UR CULT: ABNORMAL

## 2023-07-11 ENCOUNTER — HOSPITAL ENCOUNTER (EMERGENCY)
Facility: HOSPITAL | Age: 48
Discharge: HOME | End: 2023-07-11
Attending: FAMILY MEDICINE
Payer: MEDICARE

## 2023-07-11 VITALS
BODY MASS INDEX: 36.1 KG/M2 | SYSTOLIC BLOOD PRESSURE: 135 MMHG | OXYGEN SATURATION: 97 % | DIASTOLIC BLOOD PRESSURE: 79 MMHG | RESPIRATION RATE: 20 BRPM | HEART RATE: 114 BPM | HEIGHT: 67 IN | WEIGHT: 230 LBS | TEMPERATURE: 98 F

## 2023-07-11 DIAGNOSIS — F10.10 ALCOHOL ABUSE: ICD-10-CM

## 2023-07-11 DIAGNOSIS — R45.851 SUICIDAL IDEATION: Primary | ICD-10-CM

## 2023-07-11 LAB
ALBUMIN SERPL-MCNC: 4.1 G/DL (ref 3.5–5)
ALBUMIN/GLOB SERPL: 1 RATIO (ref 1.1–2)
ALP SERPL-CCNC: 99 UNIT/L (ref 40–150)
ALT SERPL-CCNC: 34 UNIT/L (ref 0–55)
AMPHET UR QL SCN: NEGATIVE
APAP SERPL-MCNC: <17.4 UG/ML (ref 17.4–30)
APPEARANCE UR: CLEAR
AST SERPL-CCNC: 30 UNIT/L (ref 5–34)
BACTERIA #/AREA URNS AUTO: NORMAL /HPF
BARBITURATE SCN PRESENT UR: NEGATIVE
BASOPHILS # BLD AUTO: 0.03 X10(3)/MCL
BASOPHILS NFR BLD AUTO: 0.3 %
BENZODIAZ UR QL SCN: NEGATIVE
BILIRUB UR QL STRIP.AUTO: ABNORMAL MG/DL
BILIRUBIN DIRECT+TOT PNL SERPL-MCNC: 0.6 MG/DL
BUN SERPL-MCNC: 10.4 MG/DL (ref 8.9–20.6)
CALCIUM SERPL-MCNC: 9.1 MG/DL (ref 8.4–10.2)
CANNABINOIDS UR QL SCN: POSITIVE
CHLORIDE SERPL-SCNC: 102 MMOL/L (ref 98–107)
CO2 SERPL-SCNC: 22 MMOL/L (ref 22–29)
COCAINE UR QL SCN: NEGATIVE
COLOR UR: YELLOW
CREAT SERPL-MCNC: 0.96 MG/DL (ref 0.73–1.18)
EOSINOPHIL # BLD AUTO: 0.01 X10(3)/MCL (ref 0–0.9)
EOSINOPHIL NFR BLD AUTO: 0.1 %
ERYTHROCYTE [DISTWIDTH] IN BLOOD BY AUTOMATED COUNT: 14.2 % (ref 11.5–17)
ETHANOL SERPL-MCNC: 158 MG/DL
ETHANOL SERPL-MCNC: 259 MG/DL
FLUAV AG UPPER RESP QL IA.RAPID: NOT DETECTED
FLUBV AG UPPER RESP QL IA.RAPID: NOT DETECTED
GFR SERPLBLD CREATININE-BSD FMLA CKD-EPI: >60 MLS/MIN/1.73/M2
GLOBULIN SER-MCNC: 4.2 GM/DL (ref 2.4–3.5)
GLUCOSE SERPL-MCNC: 106 MG/DL (ref 74–100)
GLUCOSE UR QL STRIP.AUTO: NEGATIVE MG/DL
HCT VFR BLD AUTO: 48.1 % (ref 42–52)
HGB BLD-MCNC: 15.9 G/DL (ref 14–18)
IMM GRANULOCYTES # BLD AUTO: 0.08 X10(3)/MCL (ref 0–0.04)
IMM GRANULOCYTES NFR BLD AUTO: 0.7 %
KETONES UR QL STRIP.AUTO: 15 MG/DL
LEUKOCYTE ESTERASE UR QL STRIP.AUTO: NEGATIVE UNIT/L
LYMPHOCYTES # BLD AUTO: 2.95 X10(3)/MCL (ref 0.6–4.6)
LYMPHOCYTES NFR BLD AUTO: 25.4 %
MCH RBC QN AUTO: 29.9 PG (ref 27–31)
MCHC RBC AUTO-ENTMCNC: 33.1 G/DL (ref 33–36)
MCV RBC AUTO: 90.4 FL (ref 80–94)
MONOCYTES # BLD AUTO: 0.63 X10(3)/MCL (ref 0.1–1.3)
MONOCYTES NFR BLD AUTO: 5.4 %
NEUTROPHILS # BLD AUTO: 7.9 X10(3)/MCL (ref 2.1–9.2)
NEUTROPHILS NFR BLD AUTO: 68.1 %
NITRITE UR QL STRIP.AUTO: NEGATIVE
OPIATES UR QL SCN: NEGATIVE
PCP UR QL: NEGATIVE
PH UR STRIP.AUTO: 5.5 [PH]
PH UR: 5.5 [PH] (ref 3–11)
PLATELET # BLD AUTO: 398 X10(3)/MCL (ref 130–400)
PMV BLD AUTO: 8.4 FL (ref 7.4–10.4)
POTASSIUM SERPL-SCNC: 4.1 MMOL/L (ref 3.5–5.1)
PROT SERPL-MCNC: 8.3 GM/DL (ref 6.4–8.3)
PROT UR QL STRIP.AUTO: 100 MG/DL
RBC # BLD AUTO: 5.32 X10(6)/MCL (ref 4.7–6.1)
RBC #/AREA URNS AUTO: NORMAL /HPF
RBC UR QL AUTO: NEGATIVE UNIT/L
SARS-COV-2 RNA RESP QL NAA+PROBE: NOT DETECTED
SODIUM SERPL-SCNC: 139 MMOL/L (ref 136–145)
SP GR UR STRIP.AUTO: >=1.03
SQUAMOUS #/AREA URNS AUTO: NORMAL /HPF
TSH SERPL-ACNC: 3 UIU/ML (ref 0.35–4.94)
UROBILINOGEN UR STRIP-ACNC: 1 MG/DL
WBC # SPEC AUTO: 11.6 X10(3)/MCL (ref 4.5–11.5)
WBC #/AREA URNS AUTO: NORMAL /HPF

## 2023-07-11 PROCEDURE — 81001 URINALYSIS AUTO W/SCOPE: CPT | Mod: 59 | Performed by: FAMILY MEDICINE

## 2023-07-11 PROCEDURE — 82077 ASSAY SPEC XCP UR&BREATH IA: CPT | Performed by: FAMILY MEDICINE

## 2023-07-11 PROCEDURE — 25000003 PHARM REV CODE 250: Performed by: FAMILY MEDICINE

## 2023-07-11 PROCEDURE — 80143 DRUG ASSAY ACETAMINOPHEN: CPT | Performed by: FAMILY MEDICINE

## 2023-07-11 PROCEDURE — 99285 EMERGENCY DEPT VISIT HI MDM: CPT

## 2023-07-11 PROCEDURE — 80053 COMPREHEN METABOLIC PANEL: CPT | Performed by: FAMILY MEDICINE

## 2023-07-11 PROCEDURE — 80307 DRUG TEST PRSMV CHEM ANLYZR: CPT | Performed by: FAMILY MEDICINE

## 2023-07-11 PROCEDURE — 85025 COMPLETE CBC W/AUTO DIFF WBC: CPT | Performed by: FAMILY MEDICINE

## 2023-07-11 PROCEDURE — 0240U COVID/FLU A&B PCR: CPT | Performed by: FAMILY MEDICINE

## 2023-07-11 PROCEDURE — 84443 ASSAY THYROID STIM HORMONE: CPT | Performed by: FAMILY MEDICINE

## 2023-07-11 RX ORDER — LORAZEPAM 0.5 MG/1
1 TABLET ORAL
Status: COMPLETED | OUTPATIENT
Start: 2023-07-11 | End: 2023-07-11

## 2023-07-11 RX ADMIN — LORAZEPAM 1 MG: 0.5 TABLET ORAL at 11:07

## 2023-07-11 NOTE — ED NOTES
"Pt states that he would like his boss "ms Pineda" to be called and let her know that he is ok but does not want her to know whats going on. [phone]. Ms Pineda was not available.   "

## 2023-07-11 NOTE — ED PROVIDER NOTES
Encounter Date: 7/11/2023       History     Chief Complaint   Patient presents with    Suicidal     Pt states he relapsed from ETOH and having SI; pt reports his last drink was this AM at 0800, dafne ashton; pt reports about 2 days ago he began thinking of his mom who passed away and began having suicidal thoughts, states his plan was to use a gun which is at his house, however he came here instead; denies HI      48-year-old male presents depressed drinking alcohol said he is suicidal thinking about his mom wants to get some help      Review of patient's allergies indicates:   Allergen Reactions    Penicillins     Penicillin Hives and Rash     Past Medical History:   Diagnosis Date    Alcohol abuse     Depression     Hepatitis C      No past surgical history on file.  No family history on file.  Social History     Tobacco Use    Smoking status: Every Day     Packs/day: 1.00     Years: 10.00     Pack years: 10.00     Types: Cigarettes    Smokeless tobacco: Never   Substance Use Topics    Drug use: Yes     Review of Systems   Constitutional:  Negative for fever.   HENT:  Negative for sore throat.    Respiratory:  Negative for shortness of breath.    Cardiovascular:  Negative for chest pain.   Gastrointestinal:  Negative for nausea.   Genitourinary:  Negative for dysuria.   Musculoskeletal:  Negative for back pain.   Skin:  Negative for rash.   Neurological:  Negative for weakness.   Hematological:  Does not bruise/bleed easily.   Psychiatric/Behavioral:  Positive for sleep disturbance and suicidal ideas.    All other systems reviewed and are negative.    Physical Exam     Initial Vitals [07/11/23 1010]   BP Pulse Resp Temp SpO2   135/79 (!) 114 20 98.3 °F (36.8 °C) 97 %      MAP       --         Physical Exam    Nursing note and vitals reviewed.  Constitutional: He appears well-developed and well-nourished. He is active.   HENT:   Head: Normocephalic and atraumatic.   Eyes: Conjunctivae, EOM and lids are normal.  Pupils are equal, round, and reactive to light.   Neck: Trachea normal and phonation normal. Neck supple. No thyroid mass present.   Normal range of motion.  Cardiovascular:  Normal rate, regular rhythm, normal heart sounds and normal pulses.           Pulmonary/Chest: Breath sounds normal.   Abdominal: Abdomen is soft. Bowel sounds are normal.   Musculoskeletal:         General: Normal range of motion.      Cervical back: Normal range of motion and neck supple.     Neurological: He is alert and oriented to person, place, and time. GCS score is 15. GCS eye subscore is 4. GCS verbal subscore is 5. GCS motor subscore is 6.   Skin: Skin is warm and intact.   Psychiatric: His speech is normal and behavior is normal. Judgment normal. Cognition and memory are normal.   Patient is suicidal       ED Course   Procedures  Labs Reviewed   COMPREHENSIVE METABOLIC PANEL - Abnormal; Notable for the following components:       Result Value    Glucose Level 106 (*)     Globulin 4.2 (*)     Albumin/Globulin Ratio 1.0 (*)     All other components within normal limits   URINALYSIS, REFLEX TO URINE CULTURE - Abnormal; Notable for the following components:    Protein,  (*)     Ketones, UA 15 (*)     Bilirubin, UA Small (*)     All other components within normal limits   DRUG SCREEN, URINE (BEAKER) - Abnormal; Notable for the following components:    Cannabinoids, Urine Positive (*)     All other components within normal limits    Narrative:     Cut off concentrations:    Amphetamines - 1000 ng/ml  Barbiturates - 200 ng/ml  Benzodiazepine - 200 ng/ml  Cannabinoids (THC) - 50 ng/ml  Cocaine - 300 ng/ml  Fentanyl - 1.0 ng/ml  MDMA - 500 ng/ml  Opiates - 300 ng/ml   Phencyclidine (PCP) - 25 ng/ml    Specimen submitted for drug analysis and tested for pH and specific gravity in order to evaluate sample integrity. Suspect tampering if specific gravity is <1.003 and/or pH is not within the range of 4.5 - 8.0  False negatives may result  form substances such as bleach added to urine.  False positives may result for the presence of a substance with similar chemical structure to the drug or its metabolite.    This test provides only a PRELIMINARY analytical test result. A more specific alternate chemical method must be used in order to obtain a confirmed analytical result. Gas chromatography/mass spectrometry (GC/MS) is the preferred confirmatory method. Other chemical confirmation methods are available. Clinical consideration and professional judgement should be applied to any drug of abuse test result, particularly when preliminary positive results are used.    Positive results will be confirmed only at the physicians request. Unconfirmed screening results are to be used only for medical purposes (treatment).        ALCOHOL,MEDICAL (ETHANOL) - Abnormal; Notable for the following components:    Ethanol Level 259.0 (*)     All other components within normal limits   ACETAMINOPHEN LEVEL - Abnormal; Notable for the following components:    Acetaminophen Level <17.4 (*)     All other components within normal limits   CBC WITH DIFFERENTIAL - Abnormal; Notable for the following components:    WBC 11.60 (*)     IG# 0.08 (*)     All other components within normal limits   ALCOHOL,MEDICAL (ETHANOL) - Abnormal; Notable for the following components:    Ethanol Level 158.0 (*)     All other components within normal limits   TSH - Normal   COVID/FLU A&B PCR - Normal    Narrative:     The Xpert Xpress SARS-CoV-2/FLU/RSV plus is a rapid, multiplexed real-time PCR test intended for the simultaneous qualitative detection and differentiation of SARS-CoV-2, Influenza A, Influenza B, and respiratory syncytial virus (RSV) viral RNA in either nasopharyngeal swab or nasal swab specimens.         URINALYSIS, MICROSCOPIC - Normal   CBC W/ AUTO DIFFERENTIAL    Narrative:     The following orders were created for panel order CBC auto differential.  Procedure                                Abnormality         Status                     ---------                               -----------         ------                     CBC with Differential[[id number]]        Abnormal            Final result                 Please view results for these tests on the individual orders.          Imaging Results    None          Medications   LORazepam tablet 1 mg (1 mg Oral Given 7/11/23 1103)     Medical Decision Making:   Initial Assessment:   48-year-old presents with some suicidal ideations intoxicated depression vital signs are stable he is intoxicated patient is asking for help his very calm and collected here just says having thoughts of suicide exam is unremarkable yes suicidal clear send him to psychiatric hospital for help  Differential Diagnosis:   Suicidal ideation bipolar depression associated disorder  Clinical Tests:   Lab Tests: Ordered and Reviewed  The following lab test(s) were unremarkable: CBC, BMP and Urinalysis  ED Management:  Patient being transferred pec for psych help  Other:   I have discussed this case with another health care provider.           ED Course as of 07/11/23 1553   Tue Jul 11, 2023   1206 SARS-CoV2 (COVID-19) Qualitative PCR: Not Detected [BL]   1206 Influenza B, Molecular: Not Detected [BL]   1206 Influenza A, Molecular: Not Detected [BL]   1206 Alcohol, Serum(!): 259.0 [BL]   1206 WBC(!): 11.60 [BL]   1206 RBC: 5.32 [BL]   1206 Hemoglobin: 15.9 [BL]   1206 Hematocrit: 48.1 [BL]   1206 Glucose(!): 106 [BL]   1206 BUN: 10.4 [BL]   1206 Acetaminophen (Tylenol), Serum(!): <17.4 [BL]   1425 Cannabinoids, Urine(!): Positive [BL]   1502 Alcohol, Serum(!): 158.0 [BL]      ED Course User Index  [BL] Rony Diggs MD       Medically cleared for psychiatry placement: 7/11/2023  3:02 PM         Clinical Impression:   Final diagnoses:  [R45.851] Suicidal ideation (Primary)  [F10.10] Alcohol abuse        ED Disposition Condition    Transfer to Psych Facility Stable           ED Prescriptions    None       Follow-up Information    None          Rony Diggs MD  07/11/23 1504       Rony Diggs MD  07/11/23 1543       Rony Diggs MD  07/11/23 1547       Royn Diggs MD  07/11/23 1552       Rony Diggs MD  07/11/23 155

## 2023-07-11 NOTE — ED NOTES
[facility] called to see if what their alcohol level  they will take/ laura called back to say she  called her don that will take him now/ joes called to bring pt to [facility]

## 2023-07-11 NOTE — ED NOTES
1835:07/11/2023 [facility] called and said the packet we sent  did not have labs/ all labs faxed over

## 2023-07-28 ENCOUNTER — HOSPITAL ENCOUNTER (EMERGENCY)
Facility: HOSPITAL | Age: 48
Discharge: HOME | End: 2023-07-28
Attending: EMERGENCY MEDICINE
Payer: MEDICARE

## 2023-07-28 VITALS
HEIGHT: 67 IN | HEART RATE: 81 BPM | SYSTOLIC BLOOD PRESSURE: 112 MMHG | TEMPERATURE: 98 F | RESPIRATION RATE: 20 BRPM | OXYGEN SATURATION: 94 % | DIASTOLIC BLOOD PRESSURE: 86 MMHG | WEIGHT: 230 LBS | BODY MASS INDEX: 36.1 KG/M2

## 2023-07-28 DIAGNOSIS — F10.20 ALCOHOLISM: ICD-10-CM

## 2023-07-28 DIAGNOSIS — F10.920 ALCOHOLIC INTOXICATION WITHOUT COMPLICATION: Primary | ICD-10-CM

## 2023-07-28 LAB
ALBUMIN SERPL-MCNC: 4 G/DL (ref 3.5–5)
ALBUMIN/GLOB SERPL: 0.9 RATIO (ref 1.1–2)
ALP SERPL-CCNC: 75 UNIT/L (ref 40–150)
ALT SERPL-CCNC: 31 UNIT/L (ref 0–55)
AMPHET UR QL SCN: NEGATIVE
APAP SERPL-MCNC: <17.4 UG/ML (ref 17.4–30)
APPEARANCE UR: CLEAR
AST SERPL-CCNC: 34 UNIT/L (ref 5–34)
BACTERIA #/AREA URNS AUTO: NORMAL /HPF
BARBITURATE SCN PRESENT UR: NEGATIVE
BASOPHILS # BLD AUTO: 0.03 X10(3)/MCL
BASOPHILS NFR BLD AUTO: 0.3 %
BENZODIAZ UR QL SCN: POSITIVE
BILIRUB UR QL STRIP.AUTO: NEGATIVE
BILIRUBIN DIRECT+TOT PNL SERPL-MCNC: 0.3 MG/DL
BUN SERPL-MCNC: 9 MG/DL (ref 8.9–20.6)
CALCIUM SERPL-MCNC: 9.1 MG/DL (ref 8.4–10.2)
CANNABINOIDS UR QL SCN: POSITIVE
CHLORIDE SERPL-SCNC: 104 MMOL/L (ref 98–107)
CO2 SERPL-SCNC: 23 MMOL/L (ref 22–29)
COCAINE UR QL SCN: NEGATIVE
COLOR UR: YELLOW
CREAT SERPL-MCNC: 1.1 MG/DL (ref 0.73–1.18)
EOSINOPHIL # BLD AUTO: 0.07 X10(3)/MCL (ref 0–0.9)
EOSINOPHIL NFR BLD AUTO: 0.8 %
ERYTHROCYTE [DISTWIDTH] IN BLOOD BY AUTOMATED COUNT: 14.2 % (ref 11.5–17)
ETHANOL SERPL-MCNC: 139 MG/DL
ETHANOL SERPL-MCNC: 265 MG/DL
FLUAV AG UPPER RESP QL IA.RAPID: NOT DETECTED
FLUBV AG UPPER RESP QL IA.RAPID: NOT DETECTED
GFR SERPLBLD CREATININE-BSD FMLA CKD-EPI: >60 MLS/MIN/1.73/M2
GLOBULIN SER-MCNC: 4.3 GM/DL (ref 2.4–3.5)
GLUCOSE SERPL-MCNC: 98 MG/DL (ref 74–100)
GLUCOSE UR QL STRIP.AUTO: NEGATIVE
HCT VFR BLD AUTO: 42.5 % (ref 42–52)
HGB BLD-MCNC: 14 G/DL (ref 14–18)
IMM GRANULOCYTES # BLD AUTO: 0.05 X10(3)/MCL (ref 0–0.04)
IMM GRANULOCYTES NFR BLD AUTO: 0.5 %
KETONES UR QL STRIP.AUTO: ABNORMAL
LEUKOCYTE ESTERASE UR QL STRIP.AUTO: NEGATIVE
LYMPHOCYTES # BLD AUTO: 3 X10(3)/MCL (ref 0.6–4.6)
LYMPHOCYTES NFR BLD AUTO: 32.5 %
MCH RBC QN AUTO: 31 PG (ref 27–31)
MCHC RBC AUTO-ENTMCNC: 32.9 G/DL (ref 33–36)
MCV RBC AUTO: 94 FL (ref 80–94)
MONOCYTES # BLD AUTO: 0.48 X10(3)/MCL (ref 0.1–1.3)
MONOCYTES NFR BLD AUTO: 5.2 %
NEUTROPHILS # BLD AUTO: 5.61 X10(3)/MCL (ref 2.1–9.2)
NEUTROPHILS NFR BLD AUTO: 60.7 %
NITRITE UR QL STRIP.AUTO: NEGATIVE
OPIATES UR QL SCN: NEGATIVE
PCP UR QL: NEGATIVE
PH UR STRIP.AUTO: 5 [PH]
PH UR: 5 [PH] (ref 3–11)
PLATELET # BLD AUTO: 441 X10(3)/MCL (ref 130–400)
PMV BLD AUTO: 9 FL (ref 7.4–10.4)
POTASSIUM SERPL-SCNC: 3.7 MMOL/L (ref 3.5–5.1)
PROT SERPL-MCNC: 8.3 GM/DL (ref 6.4–8.3)
PROT UR QL STRIP.AUTO: 30
RBC # BLD AUTO: 4.52 X10(6)/MCL (ref 4.7–6.1)
RBC #/AREA URNS AUTO: NORMAL /HPF
RBC UR QL AUTO: ABNORMAL
SARS-COV-2 RNA RESP QL NAA+PROBE: NOT DETECTED
SODIUM SERPL-SCNC: 141 MMOL/L (ref 136–145)
SP GR UR STRIP.AUTO: >=1.03
SQUAMOUS #/AREA URNS AUTO: NORMAL /HPF
TSH SERPL-ACNC: 2.38 UIU/ML (ref 0.35–4.94)
UROBILINOGEN UR STRIP-ACNC: 1
WBC # SPEC AUTO: 9.24 X10(3)/MCL (ref 4.5–11.5)
WBC #/AREA URNS AUTO: NORMAL /HPF

## 2023-07-28 PROCEDURE — 84443 ASSAY THYROID STIM HORMONE: CPT | Performed by: EMERGENCY MEDICINE

## 2023-07-28 PROCEDURE — 80053 COMPREHEN METABOLIC PANEL: CPT | Performed by: EMERGENCY MEDICINE

## 2023-07-28 PROCEDURE — 82077 ASSAY SPEC XCP UR&BREATH IA: CPT | Performed by: EMERGENCY MEDICINE

## 2023-07-28 PROCEDURE — 81001 URINALYSIS AUTO W/SCOPE: CPT | Mod: 59 | Performed by: EMERGENCY MEDICINE

## 2023-07-28 PROCEDURE — 80307 DRUG TEST PRSMV CHEM ANLYZR: CPT | Performed by: EMERGENCY MEDICINE

## 2023-07-28 PROCEDURE — 80143 DRUG ASSAY ACETAMINOPHEN: CPT | Performed by: EMERGENCY MEDICINE

## 2023-07-28 PROCEDURE — 99283 EMERGENCY DEPT VISIT LOW MDM: CPT

## 2023-07-28 PROCEDURE — 0240U COVID/FLU A&B PCR: CPT | Performed by: EMERGENCY MEDICINE

## 2023-07-28 PROCEDURE — 85025 COMPLETE CBC W/AUTO DIFF WBC: CPT | Performed by: EMERGENCY MEDICINE

## 2023-07-28 NOTE — ED NOTES
Pt resting in bed, easily arouses when name is called, no distress or sob noted. Will continue to monitor and intervene as needed.

## 2023-07-28 NOTE — ED PROVIDER NOTES
Encounter Date: 7/28/2023       History     Chief Complaint   Patient presents with    Psychiatric Evaluation     Pt was at [facility] in  and drank 1/5 of dafne ashton and called [facility] in [place] stating that he was depressed and wanted to go to psych facility. Pt brought in by [facility] for Medical Evaluation. No si/hi     This 48-year-old man presents with complaining of relapse of his alcoholism he was picked up at [facility] where he had drunk a 5th of a Dafne Raymundo.  He would already called [facility] told them that he was depressed and wished to go to the facility.  They told him to come here and get medically cleared.     Review of patient's allergies indicates:   Allergen Reactions    Penicillins     Penicillin Hives and Rash     Past Medical History:   Diagnosis Date    Alcohol abuse     Depression     Hepatitis C      No past surgical history on file.  No family history on file.  Social History     Tobacco Use    Smoking status: Every Day     Packs/day: 1.00     Years: 10.00     Pack years: 10.00     Types: Cigarettes    Smokeless tobacco: Never   Substance Use Topics    Drug use: Yes     Review of Systems   Constitutional:  Negative for fever.   HENT:  Negative for sore throat.    Respiratory:  Negative for shortness of breath.    Cardiovascular:  Negative for chest pain.   Gastrointestinal:  Negative for nausea.   Genitourinary:  Negative for dysuria.   Musculoskeletal:  Negative for back pain.   Skin:  Negative for rash.   Neurological:  Negative for weakness.   Hematological:  Does not bruise/bleed easily.   Psychiatric/Behavioral:  Positive for dysphoric mood. Negative for suicidal ideas.      Physical Exam     Initial Vitals [07/28/23 1008]   BP Pulse Resp Temp SpO2   130/71 93 18 98.4 °F (36.9 °C) 96 %      MAP       --         Physical Exam    Nursing note and vitals reviewed.  Constitutional: He appears well-developed and well-nourished.   HENT:   Head:  Normocephalic and atraumatic.   Mouth/Throat: Mucous membranes are normal.   Eyes: EOM are normal. Pupils are equal, round, and reactive to light.   Neck: Neck supple.   Normal range of motion.  Cardiovascular:  Normal rate, regular rhythm, normal heart sounds and intact distal pulses.           Pulmonary/Chest: Breath sounds normal.   Abdominal: Abdomen is soft. Bowel sounds are normal.   Musculoskeletal:         General: Normal range of motion.      Cervical back: Normal range of motion and neck supple.     Neurological: He is alert and oriented to person, place, and time. He has normal strength.   Skin: Skin is warm and dry. Capillary refill takes less than 2 seconds.   Psychiatric: Judgment normal. He is slowed. He exhibits a depressed mood. He expresses no homicidal and no suicidal ideation. He expresses no suicidal plans and no homicidal plans.       ED Course   Procedures  Labs Reviewed   COMPREHENSIVE METABOLIC PANEL - Abnormal; Notable for the following components:       Result Value    Globulin 4.3 (*)     Albumin/Globulin Ratio 0.9 (*)     All other components within normal limits   URINALYSIS, REFLEX TO URINE CULTURE - Abnormal; Notable for the following components:    Protein, UA 30 (*)     Ketones, UA Trace (*)     Blood, UA Trace-Intact (*)     All other components within normal limits   DRUG SCREEN, URINE (BEAKER) - Abnormal; Notable for the following components:    Benzodiazepine, Urine Positive (*)     Cannabinoids, Urine Positive (*)     All other components within normal limits    Narrative:     Cut off concentrations:    Amphetamines - 1000 ng/ml  Barbiturates - 200 ng/ml  Benzodiazepine - 200 ng/ml  Cannabinoids (THC) - 50 ng/ml  Cocaine - 300 ng/ml  Fentanyl - 1.0 ng/ml  MDMA - 500 ng/ml  Opiates - 300 ng/ml   Phencyclidine (PCP) - 25 ng/ml    Specimen submitted for drug analysis and tested for pH and specific gravity in order to evaluate sample integrity. Suspect tampering if specific gravity  is <1.003 and/or pH is not within the range of 4.5 - 8.0  False negatives may result form substances such as bleach added to urine.  False positives may result for the presence of a substance with similar chemical structure to the drug or its metabolite.    This test provides only a PRELIMINARY analytical test result. A more specific alternate chemical method must be used in order to obtain a confirmed analytical result. Gas chromatography/mass spectrometry (GC/MS) is the preferred confirmatory method. Other chemical confirmation methods are available. Clinical consideration and professional judgement should be applied to any drug of abuse test result, particularly when preliminary positive results are used.    Positive results will be confirmed only at the physicians request. Unconfirmed screening results are to be used only for medical purposes (treatment).        ALCOHOL,MEDICAL (ETHANOL) - Abnormal; Notable for the following components:    Ethanol Level 265.0 (*)     All other components within normal limits   ACETAMINOPHEN LEVEL - Abnormal; Notable for the following components:    Acetaminophen Level <17.4 (*)     All other components within normal limits   CBC WITH DIFFERENTIAL - Abnormal; Notable for the following components:    RBC 4.52 (*)     MCHC 32.9 (*)     Platelet 441 (*)     IG# 0.05 (*)     All other components within normal limits   TSH - Normal   COVID/FLU A&B PCR - Normal    Narrative:     The Xpert Xpress SARS-CoV-2/FLU/RSV plus is a rapid, multiplexed real-time PCR test intended for the simultaneous qualitative detection and differentiation of SARS-CoV-2, Influenza A, Influenza B, and respiratory syncytial virus (RSV) viral RNA in either nasopharyngeal swab or nasal swab specimens.         URINALYSIS, MICROSCOPIC - Normal   CBC W/ AUTO DIFFERENTIAL    Narrative:     The following orders were created for panel order CBC auto differential.  Procedure                               Abnormality          Status                     ---------                               -----------         ------                     CBC with Differential[[id number]]        Abnormal            Final result                 Please view results for these tests on the individual orders.   ALCOHOL,MEDICAL (ETHANOL)          Imaging Results    None          Medications - No data to display  Medical Decision Making:   Clinical Tests:   Lab Tests: Ordered and Reviewed  ED Management:  Now that the patient has sobered up he states he no longer wishes to go to [facility] for psychiatric help. He states he has a job and cant afford to get fired.                         Clinical Impression:   Final diagnoses:  [F10.920] Alcoholic intoxication without complication (Primary)  [F10.20] Alcoholism        ED Disposition Condition    Discharge Stable          ED Prescriptions    None       Follow-up Information    None          Santos Larson MD  07/28/23 4473

## 2023-07-31 ENCOUNTER — HOSPITAL ENCOUNTER (EMERGENCY)
Facility: HOSPITAL | Age: 48
Discharge: PSYCHIATRIC OR SUBSTANCE USE FACILITY | End: 2023-08-01
Attending: SPECIALIST
Payer: MEDICARE

## 2023-07-31 DIAGNOSIS — F19.10 SUBSTANCE ABUSE: ICD-10-CM

## 2023-07-31 DIAGNOSIS — R45.851 SUICIDAL IDEATION: Primary | ICD-10-CM

## 2023-07-31 DIAGNOSIS — F10.220 ALCOHOL DEPENDENCE WITH UNCOMPLICATED INTOXICATION: ICD-10-CM

## 2023-07-31 LAB
ALBUMIN SERPL-MCNC: 4.1 G/DL (ref 3.5–5)
ALBUMIN/GLOB SERPL: 1 RATIO (ref 1.1–2)
ALP SERPL-CCNC: 96 UNIT/L (ref 40–150)
ALT SERPL-CCNC: 41 UNIT/L (ref 0–55)
AMPHET UR QL SCN: NEGATIVE
APAP SERPL-MCNC: <17.4 UG/ML (ref 17.4–30)
APPEARANCE UR: ABNORMAL
AST SERPL-CCNC: 29 UNIT/L (ref 5–34)
BACTERIA #/AREA URNS AUTO: ABNORMAL /HPF
BARBITURATE SCN PRESENT UR: NEGATIVE
BASOPHILS # BLD AUTO: 0.02 X10(3)/MCL
BASOPHILS NFR BLD AUTO: 0.2 %
BENZODIAZ UR QL SCN: POSITIVE
BILIRUB UR QL STRIP.AUTO: ABNORMAL
BILIRUBIN DIRECT+TOT PNL SERPL-MCNC: 0.4 MG/DL
BUN SERPL-MCNC: 9.5 MG/DL (ref 8.9–20.6)
CALCIUM SERPL-MCNC: 9.4 MG/DL (ref 8.4–10.2)
CANNABINOIDS UR QL SCN: POSITIVE
CHLORIDE SERPL-SCNC: 100 MMOL/L (ref 98–107)
CO2 SERPL-SCNC: 27 MMOL/L (ref 22–29)
COCAINE UR QL SCN: NEGATIVE
COLOR UR: ABNORMAL
CREAT SERPL-MCNC: 1.09 MG/DL (ref 0.73–1.18)
EOSINOPHIL # BLD AUTO: 0.01 X10(3)/MCL (ref 0–0.9)
EOSINOPHIL NFR BLD AUTO: 0.1 %
ERYTHROCYTE [DISTWIDTH] IN BLOOD BY AUTOMATED COUNT: 14.1 % (ref 11.5–17)
ETHANOL SERPL-MCNC: 206 MG/DL
GFR SERPLBLD CREATININE-BSD FMLA CKD-EPI: >60 MLS/MIN/1.73/M2
GLOBULIN SER-MCNC: 4.3 GM/DL (ref 2.4–3.5)
GLUCOSE SERPL-MCNC: 147 MG/DL (ref 74–100)
GLUCOSE UR QL STRIP.AUTO: NEGATIVE
HCT VFR BLD AUTO: 46 % (ref 42–52)
HGB BLD-MCNC: 15 G/DL (ref 14–18)
IMM GRANULOCYTES # BLD AUTO: 0.05 X10(3)/MCL (ref 0–0.04)
IMM GRANULOCYTES NFR BLD AUTO: 0.5 %
KETONES UR QL STRIP.AUTO: NEGATIVE
LEUKOCYTE ESTERASE UR QL STRIP.AUTO: NEGATIVE
LYMPHOCYTES # BLD AUTO: 3.53 X10(3)/MCL (ref 0.6–4.6)
LYMPHOCYTES NFR BLD AUTO: 35.5 %
MCH RBC QN AUTO: 29.9 PG (ref 27–31)
MCHC RBC AUTO-ENTMCNC: 32.6 G/DL (ref 33–36)
MCV RBC AUTO: 91.6 FL (ref 80–94)
MONOCYTES # BLD AUTO: 0.78 X10(3)/MCL (ref 0.1–1.3)
MONOCYTES NFR BLD AUTO: 7.9 %
MUCOUS THREADS URNS QL MICRO: ABNORMAL /LPF
NEUTROPHILS # BLD AUTO: 5.54 X10(3)/MCL (ref 2.1–9.2)
NEUTROPHILS NFR BLD AUTO: 55.8 %
NITRITE UR QL STRIP.AUTO: NEGATIVE
OPIATES UR QL SCN: NEGATIVE
PCP UR QL: NEGATIVE
PH UR STRIP.AUTO: 5.5 [PH]
PH UR: 5.5 [PH] (ref 3–11)
PLATELET # BLD AUTO: 454 X10(3)/MCL (ref 130–400)
PMV BLD AUTO: 9 FL (ref 7.4–10.4)
POTASSIUM SERPL-SCNC: 3.3 MMOL/L (ref 3.5–5.1)
PROT SERPL-MCNC: 8.4 GM/DL (ref 6.4–8.3)
PROT UR QL STRIP.AUTO: 100
RBC # BLD AUTO: 5.02 X10(6)/MCL (ref 4.7–6.1)
RBC #/AREA URNS AUTO: ABNORMAL /HPF
RBC UR QL AUTO: NEGATIVE
SALICYLATES SERPL-MCNC: <5 MG/DL
SODIUM SERPL-SCNC: 141 MMOL/L (ref 136–145)
SP GR UR STRIP.AUTO: >=1.03
SPECIFIC GRAVITY, URINE AUTO (.000) (OHS): >=1.03 (ref 1–1.03)
SQUAMOUS #/AREA URNS AUTO: ABNORMAL /HPF
UROBILINOGEN UR STRIP-ACNC: 1
WBC # SPEC AUTO: 9.93 X10(3)/MCL (ref 4.5–11.5)
WBC #/AREA URNS AUTO: ABNORMAL /HPF

## 2023-07-31 PROCEDURE — 80053 COMPREHEN METABOLIC PANEL: CPT | Performed by: SPECIALIST

## 2023-07-31 PROCEDURE — 99285 EMERGENCY DEPT VISIT HI MDM: CPT | Mod: 25

## 2023-07-31 PROCEDURE — 80307 DRUG TEST PRSMV CHEM ANLYZR: CPT | Performed by: SPECIALIST

## 2023-07-31 PROCEDURE — 25000003 PHARM REV CODE 250: Performed by: SPECIALIST

## 2023-07-31 PROCEDURE — 87088 URINE BACTERIA CULTURE: CPT | Performed by: SPECIALIST

## 2023-07-31 PROCEDURE — 82077 ASSAY SPEC XCP UR&BREATH IA: CPT | Performed by: SPECIALIST

## 2023-07-31 PROCEDURE — 80143 DRUG ASSAY ACETAMINOPHEN: CPT | Performed by: SPECIALIST

## 2023-07-31 PROCEDURE — 80179 DRUG ASSAY SALICYLATE: CPT | Performed by: SPECIALIST

## 2023-07-31 PROCEDURE — 96360 HYDRATION IV INFUSION INIT: CPT

## 2023-07-31 PROCEDURE — 81001 URINALYSIS AUTO W/SCOPE: CPT | Mod: 59 | Performed by: SPECIALIST

## 2023-07-31 PROCEDURE — 0240U COVID/FLU A&B PCR: CPT | Performed by: SPECIALIST

## 2023-07-31 PROCEDURE — 85025 COMPLETE CBC W/AUTO DIFF WBC: CPT | Performed by: SPECIALIST

## 2023-07-31 PROCEDURE — 84443 ASSAY THYROID STIM HORMONE: CPT | Performed by: SPECIALIST

## 2023-07-31 RX ADMIN — SODIUM CHLORIDE 1000 ML: 9 INJECTION, SOLUTION INTRAVENOUS at 11:07

## 2023-08-01 VITALS
TEMPERATURE: 98 F | SYSTOLIC BLOOD PRESSURE: 109 MMHG | DIASTOLIC BLOOD PRESSURE: 71 MMHG | HEART RATE: 95 BPM | OXYGEN SATURATION: 94 % | RESPIRATION RATE: 18 BRPM

## 2023-08-01 LAB
FLUAV AG UPPER RESP QL IA.RAPID: NOT DETECTED
FLUBV AG UPPER RESP QL IA.RAPID: NOT DETECTED
SARS-COV-2 RNA RESP QL NAA+PROBE: NOT DETECTED
TSH SERPL-ACNC: 2.29 UIU/ML (ref 0.35–4.94)

## 2023-08-01 PROCEDURE — 25000003 PHARM REV CODE 250: Performed by: SPECIALIST

## 2023-08-01 RX ORDER — POTASSIUM CHLORIDE 20 MEQ/1
20 TABLET, EXTENDED RELEASE ORAL
Status: COMPLETED | OUTPATIENT
Start: 2023-08-01 | End: 2023-08-01

## 2023-08-01 RX ADMIN — POTASSIUM CHLORIDE 20 MEQ: 1500 TABLET, EXTENDED RELEASE ORAL at 02:08

## 2023-08-01 NOTE — ED NOTES
AMANDA here to  patient, he is ambulatory to stretcher. Personal belongings and copy of chart given to ems and original pec.

## 2023-08-01 NOTE — ED PROVIDER NOTES
Encounter Date: 7/31/2023       History     Chief Complaint   Patient presents with    Psychiatric Evaluation     Patient ambulatory to ED states sent here from [facility] rehab to alcohol also states suicidal thoughts and hallucinations calm and cooperative at this time     Patient presents with alcohol dependence and suicidal ideations and he states he was sent here by [facility] for evaluation and workup; patient complains of dry mouth; patient is homeless and has been living on the streets for about a year    The history is provided by the patient.     Review of patient's allergies indicates:   Allergen Reactions    Penicillins     Penicillin Hives and Rash     Past Medical History:   Diagnosis Date    Alcohol abuse     Depression     Hepatitis C      No past surgical history on file.  No family history on file.  Social History     Tobacco Use    Smoking status: Every Day     Current packs/day: 1.00     Average packs/day: 1 pack/day for 10.0 years (10.0 ttl pk-yrs)     Types: Cigarettes    Smokeless tobacco: Never   Substance Use Topics    Drug use: Yes     Review of Systems   Constitutional: Negative.    HENT: Negative.     Respiratory: Negative.     Cardiovascular: Negative.    Gastrointestinal: Negative.    Endocrine: Positive for polydipsia.   Genitourinary: Negative.    Musculoskeletal: Negative.    Neurological: Negative.        Physical Exam     Initial Vitals [07/31/23 2253]   BP Pulse Resp Temp SpO2   136/87 107 16 97.5 °F (36.4 °C) (!) 94 %      MAP       --         Physical Exam    Nursing note and vitals reviewed.  Constitutional: He appears well-developed and well-nourished.   HENT:   Head: Normocephalic and atraumatic.   Dry mucous membranes of the mouth   Eyes: EOM are normal. Pupils are equal, round, and reactive to light.   Neck: Neck supple.   Normal range of motion.  Cardiovascular:  Regular rhythm and normal heart sounds.   Tachycardia present.         Pulmonary/Chest: Breath sounds  normal.   Abdominal: Abdomen is soft.   Musculoskeletal:         General: Normal range of motion.      Cervical back: Normal range of motion and neck supple.     Neurological: He is alert and oriented to person, place, and time.   Skin: Skin is warm and dry.   Psychiatric: His speech is normal. His affect is blunt. He is withdrawn. He expresses suicidal ideation.         ED Course   Procedures  Labs Reviewed   COMPREHENSIVE METABOLIC PANEL - Abnormal; Notable for the following components:       Result Value    Potassium Level 3.3 (*)     Glucose Level 147 (*)     Protein Total 8.4 (*)     Globulin 4.3 (*)     Albumin/Globulin Ratio 1.0 (*)     All other components within normal limits   URINALYSIS, REFLEX TO URINE CULTURE - Abnormal; Notable for the following components:    Appearance, UA Cloudy (*)     Protein,  (*)     Bilirubin, UA Small (*)     All other components within normal limits   DRUG SCREEN, URINE (BEAKER) - Abnormal; Notable for the following components:    Benzodiazepine, Urine Positive (*)     Cannabinoids, Urine Positive (*)     All other components within normal limits    Narrative:     Cut off concentrations:    Amphetamines - 1000 ng/ml  Barbiturates - 200 ng/ml  Benzodiazepine - 200 ng/ml  Cannabinoids (THC) - 50 ng/ml  Cocaine - 300 ng/ml  Fentanyl - 1.0 ng/ml  MDMA - 500 ng/ml  Opiates - 300 ng/ml   Phencyclidine (PCP) - 25 ng/ml    Specimen submitted for drug analysis and tested for pH and specific gravity in order to evaluate sample integrity. Suspect tampering if specific gravity is <1.003 and/or pH is not within the range of 4.5 - 8.0  False negatives may result form substances such as bleach added to urine.  False positives may result for the presence of a substance with similar chemical structure to the drug or its metabolite.    This test provides only a PRELIMINARY analytical test result. A more specific alternate chemical method must be used in order to obtain a confirmed  analytical result. Gas chromatography/mass spectrometry (GC/MS) is the preferred confirmatory method. Other chemical confirmation methods are available. Clinical consideration and professional judgement should be applied to any drug of abuse test result, particularly when preliminary positive results are used.    Positive results will be confirmed only at the physicians request. Unconfirmed screening results are to be used only for medical purposes (treatment).        ALCOHOL,MEDICAL (ETHANOL) - Abnormal; Notable for the following components:    Ethanol Level 206.0 (*)     All other components within normal limits   ACETAMINOPHEN LEVEL - Abnormal; Notable for the following components:    Acetaminophen Level <17.4 (*)     All other components within normal limits   CBC WITH DIFFERENTIAL - Abnormal; Notable for the following components:    MCHC 32.6 (*)     Platelet 454 (*)     IG# 0.05 (*)     All other components within normal limits   URINALYSIS, MICROSCOPIC - Abnormal; Notable for the following components:    Bacteria, UA Many (*)     Mucous, UA Many (*)     WBC, UA 11-20 (*)     All other components within normal limits   TSH - Normal   COVID/FLU A&B PCR - Normal    Narrative:     The Xpert Xpress SARS-CoV-2/FLU/RSV plus is a rapid, multiplexed real-time PCR test intended for the simultaneous qualitative detection and differentiation of SARS-CoV-2, Influenza A, Influenza B, and respiratory syncytial virus (RSV) viral RNA in either nasopharyngeal swab or nasal swab specimens.         CULTURE, URINE   CBC W/ AUTO DIFFERENTIAL    Narrative:     The following orders were created for panel order CBC auto differential.  Procedure                               Abnormality         Status                     ---------                               -----------         ------                     CBC with Differential[[id number]]        Abnormal            Final result                 Please view results for these tests on  the individual orders.   SALICYLATE LEVEL          Imaging Results    None          Medications   potassium chloride SA CR tablet 20 mEq (has no administration in time range)   sodium chloride 0.9% bolus 1,000 mL 1,000 mL (0 mLs Intravenous Stopped 8/1/23 0029)     Medical Decision Making:   Initial Assessment:   Patient presents with alcohol dependence and suicidal ideations and he states he was sent here by [facility] for evaluation and workup; patient complains of dry mouth; patient is homeless and has been living on the streets for about a year  Differential Diagnosis:   Alcohol dependence, suicidal ideation, dehydration, electrolyte abnormality  ED Management:  PEC instituted; IV started and a 1 L bag of normal saline was started             ED Course as of 08/01/23 0221 Tue Aug 01, 2023   0129 Alcohol, Serum(!): 206.0 [DD]   0129 Potassium(!): 3.3 [DD]   0129 Glucose(!): 147 [DD]   0129 Benzodiazepine Screen, Urine(!): Positive [DD]   0129 Cannabinoids, Urine(!): Positive [DD]      ED Course User Index  [DD] Durga Michel MD       Medically cleared for psychiatry placement: 8/1/2023  1:30 AM         Clinical Impression:   Final diagnoses:  [R45.851] Suicidal ideation (Primary)  [F10.220] Alcohol dependence with uncomplicated intoxication  [F19.10] Substance abuse        ED Disposition Condition    Transfer to Psych Facility Stable          ED Prescriptions    None       Follow-up Information    None          Durga Michel MD  08/01/23 0131       Durga Michel MD  08/01/23 0221

## 2023-08-03 LAB — BACTERIA UR CULT: NO GROWTH
